# Patient Record
Sex: MALE | Race: WHITE | NOT HISPANIC OR LATINO | Employment: OTHER | URBAN - METROPOLITAN AREA
[De-identification: names, ages, dates, MRNs, and addresses within clinical notes are randomized per-mention and may not be internally consistent; named-entity substitution may affect disease eponyms.]

---

## 2022-12-09 ENCOUNTER — OFFICE VISIT (OUTPATIENT)
Dept: URGENT CARE | Facility: CLINIC | Age: 52
End: 2022-12-09

## 2022-12-09 VITALS
OXYGEN SATURATION: 97 % | SYSTOLIC BLOOD PRESSURE: 189 MMHG | HEART RATE: 85 BPM | DIASTOLIC BLOOD PRESSURE: 99 MMHG | TEMPERATURE: 98.4 F

## 2022-12-09 DIAGNOSIS — J02.8 ACUTE PHARYNGITIS DUE TO OTHER SPECIFIED ORGANISMS: ICD-10-CM

## 2022-12-09 DIAGNOSIS — Z09 FOLLOW-UP TREATMENT: ICD-10-CM

## 2022-12-09 DIAGNOSIS — R05.1 ACUTE COUGH: ICD-10-CM

## 2022-12-09 DIAGNOSIS — J06.9 VIRAL UPPER RESPIRATORY TRACT INFECTION WITH COUGH: Primary | ICD-10-CM

## 2022-12-09 DIAGNOSIS — Z20.828 RSV EXPOSURE: ICD-10-CM

## 2022-12-09 LAB — S PYO AG THROAT QL: NEGATIVE

## 2022-12-09 RX ORDER — DEXTROMETHORPHAN HYDROBROMIDE AND PROMETHAZINE HYDROCHLORIDE 15; 6.25 MG/5ML; MG/5ML
5 SOLUTION ORAL 4 TIMES DAILY PRN
Qty: 160 ML | Refills: 0 | Status: SHIPPED | OUTPATIENT
Start: 2022-12-09

## 2022-12-09 RX ORDER — METFORMIN HYDROCHLORIDE 500 MG/1
TABLET, EXTENDED RELEASE ORAL
COMMUNITY

## 2022-12-09 RX ORDER — LOSARTAN POTASSIUM 25 MG/1
25 TABLET ORAL DAILY
COMMUNITY

## 2022-12-09 RX ORDER — LISINOPRIL 20 MG/1
20 TABLET ORAL DAILY
COMMUNITY

## 2022-12-09 RX ORDER — ATORVASTATIN CALCIUM 20 MG/1
20 TABLET, FILM COATED ORAL DAILY
COMMUNITY

## 2022-12-09 RX ORDER — FENOFIBRATE 145 MG/1
145 TABLET, COATED ORAL DAILY
COMMUNITY

## 2022-12-09 RX ORDER — LEVOTHYROXINE SODIUM 0.03 MG/1
25 TABLET ORAL DAILY
COMMUNITY

## 2022-12-09 NOTE — PROGRESS NOTES
[facility]        NAME: Flower Caballero is a 47 y o  male  : 1970    MRN: [id number]  DATE: 2022  TIME: 10:35 AM    Assessment and Plan   Viral upper respiratory tract infection with cough [J06 9]  1  Viral upper respiratory tract infection with cough  Promethazine-DM (PHENERGAN-DM) 6 25-15 mg/5 mL oral syrup    Ambulatory Referral to [facility]      2  Acute pharyngitis due to other specified organisms  Promethazine-DM (PHENERGAN-DM) 6 25-15 mg/5 mL oral syrup    Ambulatory Referral to [facility]    POCT rapid strepA    Throat culture      3  Acute cough  Promethazine-DM (PHENERGAN-DM) 6 25-15 mg/5 mL oral syrup    Ambulatory Referral to [facility]    Cov/Flu-Collected at [facility]      4  RSV exposure  Promethazine-DM (PHENERGAN-DM) 6 25-15 mg/5 mL oral syrup    Ambulatory Referral to [facility]      5  Follow-up treatment  Ambulatory Referral to [facility]            Patient Instructions       Follow up with PCP in 3-5 days  Proceed to  ER if symptoms worsen  You have been prescribed promethazine DM for cough - you are not to drive or drink alcohol with this medication  Drink water  Your symptoms appear to be viral cold symptoms  Your strep A is negative  You have a throat culture pending  You are to download  mychart for the results in 3-4 days  You will be notified if the results are + and an antibiotic will be called in for you  You are to do warm salt water gargles 4 x daily  Drink warm tea with honey and lemon  Take tylenol or motrin as able for pain or fever  Chloraseptic throat spray, cough drops  Do not share utensils  Change your tooth brush in 3 days  You have flu like symptoms  You are to rest  Drink gatorade or pedialyte for rehydration  You are to eat a BRAT diet - bananas, rice, applesauce and toast   You may try imodium for diarrhea  Take tylenol or motrin for fever or pain     You are to download [facility] mychart for the results in 24-48 hours  You will be notified if the results are positive  You are to mask x 10 days and mask if still symptomatic after the 10 days  You appear to have covid/symptoms  You have a covid test pending  You are to download  mychart for the results in 24-48 hours  You will be notified if results are +  You are to take vitamin C, D, robitussin for cough  Do not take cough suppressants; you want to take an expectorant  Sleep on your stomach  You are to quarantine as required per CDC guidelines  Mask x 10 days if positive  Mask as long as you have symptoms  See your PCP for follow up in 2-3 days  Go to the ED if symptoms worsen or are severe  Chief Complaint     Chief Complaint   Patient presents with   • Cold Like Symptoms   • Cough   • Sore Throat   • Earache         History of Present Illness       This is a 47year old male who states both sons had RSV over Thanksgiving and he and his wife are now is as well  He states he has cough, congestion, diarrhea, occasional sorethroat, earache  He denies flu vaccine but has had covid vaccine  He states taking OTC w/o relief  He does not have a PCP as he just moved here from [place]   Review of Systems   Constitutional: Positive for fatigue  HENT: Positive for congestion and sore throat  Eyes: Negative  Respiratory: Positive for cough  Cardiovascular: Negative  Gastrointestinal: Positive for diarrhea  Endocrine: Negative  Genitourinary: Negative  Musculoskeletal: Negative  Skin: Negative  Allergic/Immunologic: Negative  Neurological: Negative  Hematological: Negative  Psychiatric/Behavioral: Negative            Current Medications       Current Outpatient Medications:   •  atorvastatin (LIPITOR) 20 mg tablet, Take 20 mg by mouth daily, Disp: , Rfl:   •  fenofibrate (TRICOR) 145 mg tablet, Take 145 mg by mouth daily, Disp: , Rfl:   •  levothyroxine 25 mcg tablet, Take 25 mcg by mouth daily, Disp: , Rfl:   •  losartan (COZAAR) 25 mg tablet, Take 25 mg by mouth daily, Disp: , Rfl:   •  metFORMIN (GLUCOPHAGE-XR) 500 mg 24 hr tablet, Take by mouth daily with dinner, Disp: , Rfl:   •  Promethazine-DM (PHENERGAN-DM) 6 25-15 mg/5 mL oral syrup, Take 5 mL by mouth 4 (four) times a day as needed for cough, Disp: 160 mL, Rfl: 0  •  lisinopril (ZESTRIL) 20 mg tablet, Take 20 mg by mouth daily (Patient not taking: Reported on 12/9/2022), Disp: , Rfl:     Current Allergies     Allergies as of 12/09/2022 - Reviewed 12/09/2022   Allergen Reaction Noted   • Oxycodone-acetaminophen Syncope 05/12/2015            The following portions of the patient's history were reviewed and updated as appropriate: allergies, current medications, past family history, past medical history, past social history, past surgical history and problem list      Past Medical History:   Diagnosis Date   • Hypertension        History reviewed  No pertinent surgical history  History reviewed  No pertinent family history  Medications have been verified  Objective   BP (!) 189/99   Pulse 85   Temp 98 4 °F (36 9 °C)   SpO2 97%   No LMP for male patient  Physical Exam     Physical Exam  Vitals and nursing note reviewed  Constitutional:       General: He is not in acute distress  Appearance: He is well-developed  He is obese  He is not ill-appearing, toxic-appearing or diaphoretic  HENT:      Head: Normocephalic and atraumatic  Right Ear: Tympanic membrane and ear canal normal       Left Ear: Tympanic membrane and ear canal normal       Nose: Congestion present  Mouth/Throat:      Mouth: No oral lesions  Pharynx: Uvula midline  No pharyngeal swelling, oropharyngeal exudate, posterior oropharyngeal erythema or uvula swelling  Tonsils: No tonsillar exudate or tonsillar abscesses        Comments: Injected   Cardiovascular:      Rate and Rhythm: Normal rate and regular rhythm  Heart sounds: Normal heart sounds  No murmur heard  Pulmonary:      Effort: Pulmonary effort is normal       Breath sounds: Normal breath sounds  Musculoskeletal:      Cervical back: Normal range of motion and neck supple  Lymphadenopathy:      Cervical: No cervical adenopathy  Skin:     General: Skin is warm and dry  Capillary Refill: Capillary refill takes less than 2 seconds  Neurological:      General: No focal deficit present  Mental Status: He is alert     Psychiatric:         Mood and Affect: Mood normal          Behavior: Behavior normal

## 2022-12-09 NOTE — PATIENT INSTRUCTIONS
You have been prescribed promethazine DM for cough - you are not to drive or drink alcohol with this medication  Drink water  Your symptoms appear to be viral cold symptoms  Your strep A is negative  You have a throat culture pending  You are to download [facility] mychart for the results in 3-4 days  You will be notified if the results are + and an antibiotic will be called in for you  You are to do warm salt water gargles 4 x daily  Drink warm tea with honey and lemon  Take tylenol or motrin as able for pain or fever  Chloraseptic throat spray, cough drops  Do not share utensils  Change your tooth brush in 3 days  You have flu like symptoms  You are to rest  Drink gatorade or pedialyte for rehydration  You are to eat a BRAT diet - bananas, rice, applesauce and toast   You may try imodium for diarrhea  Take tylenol or motrin for fever or pain  You are to download SL mychart for the results in 24-48 hours  You will be notified if the results are positive  You are to mask x 10 days and mask if still symptomatic after the 10 days  You appear to have covid/symptoms  You have a covid test pending  You are to download [facility] mychart for the results in 24-48 hours  You will be notified if results are +  You are to take vitamin C, D, robitussin for cough  Do not take cough suppressants; you want to take an expectorant  Sleep on your stomach  You are to quarantine as required per CDC guidelines  Mask x 10 days if positive  Mask as long as you have symptoms  See your PCP for follow up in 2-3 days  Go to the ED if symptoms worsen or are severe

## 2022-12-10 LAB
FLUAV RNA RESP QL NAA+PROBE: NEGATIVE
FLUBV RNA RESP QL NAA+PROBE: NEGATIVE
SARS-COV-2 RNA RESP QL NAA+PROBE: NEGATIVE

## 2022-12-11 LAB — BACTERIA THROAT CULT: NORMAL

## 2023-08-25 ENCOUNTER — AMB VIDEO VISIT (OUTPATIENT)
Dept: OTHER | Facility: HOSPITAL | Age: 53
End: 2023-08-25

## 2023-08-25 DIAGNOSIS — Z20.7 SCABIES EXPOSURE: Primary | ICD-10-CM

## 2023-08-25 PROBLEM — E11.9 DIABETES MELLITUS (HCC): Status: ACTIVE | Noted: 2023-08-25

## 2023-08-25 PROBLEM — G47.33 OBSTRUCTIVE SLEEP APNEA: Status: ACTIVE | Noted: 2018-02-12

## 2023-08-25 PROBLEM — K21.9 GASTROESOPHAGEAL REFLUX DISEASE: Status: ACTIVE | Noted: 2019-07-15

## 2023-08-25 PROBLEM — I36.1 NONRHEUMATIC TRICUSPID VALVE REGURGITATION: Status: ACTIVE | Noted: 2019-10-10

## 2023-08-25 PROBLEM — R01.1 CARDIAC MURMUR: Status: ACTIVE | Noted: 2019-10-10

## 2023-08-25 PROBLEM — E78.5 HYPERLIPEMIA: Status: ACTIVE | Noted: 2023-08-25

## 2023-08-25 PROBLEM — I34.0 NONRHEUMATIC MITRAL VALVE REGURGITATION: Status: ACTIVE | Noted: 2019-10-10

## 2023-08-25 PROCEDURE — ECARE PR SL URGENT CARE VIRTUAL VISIT: Performed by: PHYSICIAN ASSISTANT

## 2023-08-25 RX ORDER — PERMETHRIN 50 MG/G
CREAM TOPICAL ONCE
Qty: 60 G | Refills: 0 | Status: SHIPPED | OUTPATIENT
Start: 2023-08-25 | End: 2023-08-25

## 2023-08-25 NOTE — PATIENT INSTRUCTIONS
Scabies   WHAT YOU NEED TO KNOW:   Scabies is a skin condition that is caused by scabies mites. Scabies mites are tiny bugs that burrow, lay eggs, and live underneath the skin. Scabies is spread through close contact with a person who has scabies. This includes having sex, sleeping in the same bed, or sharing towels or clothing. Scabies can spread quickly and must be treated as soon as it is found. DISCHARGE INSTRUCTIONS:   Return to the emergency department if:   You develop a fever and red, swollen, painful areas on your skin. Call your doctor if:   The bites become crusty or filled with pus. You have worsening itching after scabies treatment. You have new bite or burrow marks after treatment. You have questions or concerns about your condition or care. Medicines: You may need the following:  Prescription creams  are used to treat scabies. Apply a thin layer of cream  onto your entire body from the neck down. Leave the cream on  for the amount of time that is required for the medicine you are using. This may be between 8 to 14 hours. Take a bath or shower  to wash all medicine from your skin after the scabies treatment is done. Put on clean clothes  after you have rinsed the medicine off. You may need another scabies treatment in 7 to 10 days if you continue to have symptoms. Take your medicine as directed. Contact your healthcare provider if you think your medicine is not helping or if you have side effects. Tell your provider if you are allergic to any medicine. Keep a list of the medicines, vitamins, and herbs you take. Include the amounts, and when and why you take them. Bring the list or the pill bottles to follow-up visits. Carry your medicine list with you in case of an emergency. Help relieve itching: Your skin may continue to itch for 2 or 3 weeks, even after the scabies mites are gone. Over-the-counter antihistamines or cortisone cream may help relieve itching. Trim your fingernails so you do not spread any mites that are still alive after treatment. Do not scratch your skin. Scratches may cause a skin infection. A cool bath may also help relieve the itching. Prevent the spread of scabies:   Have all family members use scabies medicine. Have all family members use scabies medicine. Tell all sex partners and anyone who has shared your clothing or bed for the past month about the scabies. Tell them to ask their healthcare provider for scabies medicine even if they have no itching, rash, or burrow marks. Wash all items that you have used  starting 3 days before you learned about your scabies. Use hot water to wash all clothing, bedding, and towels. Dry them for at least 20 minutes on the hot cycle of a dryer. Take items to be dry cleaned that cannot be washed in a washing machine. Place any clothing or bedding that cannot be washed or dry cleaned in a closed plastic bag for 1 week. Do not have close body contact with anyone  until the scabies mites are gone. Talk to your provider about how long you need to wait. Ask about public places you should avoid, such as the gym. Return to work or school  24 hours after using scabies medicine, or as directed. Follow up with your doctor as directed:  Write down your questions so you remember to ask them during your visits. © Copyright Janette Jewjose maria 2022 Information is for End User's use only and may not be sold, redistributed or otherwise used for commercial purposes. The above information is an  only. It is not intended as medical advice for individual conditions or treatments. Talk to your doctor, nurse or pharmacist before following any medical regimen to see if it is safe and effective for you.

## 2023-08-25 NOTE — CARE ANYWHERE EVISITS
Visit Summary for [facility] - Gender: Male - Date of Birth: [id number]  Date: [id number] - Duration: 10 minutes  Patient: [facility]  Provider: Kevon Trujillo PA-C    Patient Contact Information  Address  [address]  [place]; [place] [id number]  [phone]    Visit Topics    Triage Questions   What is your current physical address in the event of a medical emergency? Answer []  Are you allergic to any medications? Answer []  Are you now or could you be pregnant? Answer []  Do you have any immune system compromise or chronic lung   disease? Answer []  Do you have any vulnerable family members in the home (infant, pregnant, cancer, elderly)? Answer []     Conversation Transcripts  [0A][0A] [Notification] You are connected with Kevon Trujillo PA-C, Urgent Care Specialist.[0A][Notification] Madiha Parson is located in [place]. [0A][Notification] Madiha Parson has shared health history. Jose Raul Neg .[0A]    Diagnosis  Cntct w & expsr to pediculosis, acariasis & oth infestations    Procedures  Value: 81122 Code: CPT-4 UNLISTED E&M SERVICE    Medications Prescribed    No prescriptions ordered    Electronically signed by: Ethel De La Fuente(NPI [id number])

## 2023-08-25 NOTE — PROGRESS NOTES
Video Visit - Jacquie Garcia 47 y.o. male MRN: [id number]    REQUIRED DOCUMENTATION:         1. This service was provided via [employer]. 2. Provider located at [facility]  [address] [address] [phone]. 3. [facility] provider: Delisa Santacruz PA-C.  4. Identify all parties in room with patient during [facility] visit:  significant other-permission granted  5. After connecting through [employer], patient was identified by name and date of birth. Patient was then informed that this was a Telemedicine visit and that the exam was being conducted confidentially over secure lines. My office door was closed. No one else was in the room. Patient acknowledged consent and understanding of privacy and security of the Telemedicine visit. I informed the patient that I have reviewed their record in Epic and presented the opportunity for them to ask any questions regarding the visit today. The patient agreed to participate. HPI  Pt reports itching and his wife was dx with scabies. Denies rash or areas that are more intensely itchy than others, but wants prophylaxis for scabies. Hasn't tried anything for treatment yet. Physical Exam  Constitutional:       General: He is not in acute distress. Appearance: Normal appearance. He is not toxic-appearing. Comments: pleasant   HENT:      Head: Normocephalic and atraumatic. Nose: No rhinorrhea. Mouth/Throat:      Mouth: Mucous membranes are moist.   Eyes:      Conjunctiva/sclera: Conjunctivae normal.      Comments: glasses   Cardiovascular:      Rate and Rhythm: Normal rate. Pulmonary:      Effort: Pulmonary effort is normal. No respiratory distress. Breath sounds: No wheezing (no gross audible wheeze through computer). Musculoskeletal:      Cervical back: Normal range of motion. Skin:     Findings: No rash (on face or neck or hands). Neurological:      Mental Status: He is alert. Cranial Nerves:  No dysarthria or facial asymmetry. Psychiatric:         Mood and Affect: Mood normal.         Behavior: Behavior normal.         Diagnoses and all orders for this visit:    Scabies exposure  -     permethrin (ELIMITE) 5 % cream; Apply topically once for 1 dose      Patient Instructions   Scabies   WHAT YOU NEED TO KNOW:   Scabies is a skin condition that is caused by scabies mites. Scabies mites are tiny bugs that burrow, lay eggs, and live underneath the skin. Scabies is spread through close contact with a person who has scabies. This includes having sex, sleeping in the same bed, or sharing towels or clothing. Scabies can spread quickly and must be treated as soon as it is found. DISCHARGE INSTRUCTIONS:   Return to the emergency department if:   • You develop a fever and red, swollen, painful areas on your skin. Call your doctor if:   • The bites become crusty or filled with pus. • You have worsening itching after scabies treatment. • You have new bite or burrow marks after treatment. • You have questions or concerns about your condition or care. Medicines: You may need the following:  • Prescription creams  are used to treat scabies. ? Apply a thin layer of cream  onto your entire body from the neck down. ? Leave the cream on  for the amount of time that is required for the medicine you are using. This may be between 8 to 14 hours. ? Take a bath or shower  to wash all medicine from your skin after the scabies treatment is done. ? Put on clean clothes  after you have rinsed the medicine off. You may need another scabies treatment in 7 to 10 days if you continue to have symptoms. • Take your medicine as directed. Contact your healthcare provider if you think your medicine is not helping or if you have side effects. Tell your provider if you are allergic to any medicine. Keep a list of the medicines, vitamins, and herbs you take.  Include the amounts, and when and why you take them. Bring the list or the pill bottles to follow-up visits. Carry your medicine list with you in case of an emergency. Help relieve itching: Your skin may continue to itch for 2 or 3 weeks, even after the scabies mites are gone. Over-the-counter antihistamines or cortisone cream may help relieve itching. Trim your fingernails so you do not spread any mites that are still alive after treatment. Do not scratch your skin. Scratches may cause a skin infection. A cool bath may also help relieve the itching. Prevent the spread of scabies:   • Have all family members use scabies medicine. Have all family members use scabies medicine. Tell all sex partners and anyone who has shared your clothing or bed for the past month about the scabies. Tell them to ask their healthcare provider for scabies medicine even if they have no itching, rash, or burrow marks. • Wash all items that you have used  starting 3 days before you learned about your scabies. Use hot water to wash all clothing, bedding, and towels. Dry them for at least 20 minutes on the hot cycle of a dryer. Take items to be dry cleaned that cannot be washed in a washing machine. Place any clothing or bedding that cannot be washed or dry cleaned in a closed plastic bag for 1 week. • Do not have close body contact with anyone  until the scabies mites are gone. Talk to your provider about how long you need to wait. Ask about public places you should avoid, such as the gym. • Return to work or school  24 hours after using scabies medicine, or as directed. Follow up with your doctor as directed:  Write down your questions so you remember to ask them during your visits. © Copyright [facility] 2022 Information is for End User's use only and may not be sold, redistributed or otherwise used for commercial purposes. The above information is an  only. It is not intended as medical advice for individual conditions or treatments.  Talk to your doctor, nurse or pharmacist before following any medical regimen to see if it is safe and effective for you.

## 2023-08-26 ENCOUNTER — NURSE TRIAGE (OUTPATIENT)
Dept: OTHER | Facility: OTHER | Age: 53
End: 2023-08-26

## 2023-08-26 DIAGNOSIS — B86 SCABIES: Primary | ICD-10-CM

## 2023-08-26 RX ORDER — PERMETHRIN 50 MG/G
CREAM TOPICAL ONCE
Qty: 60 G | Refills: 0 | Status: SHIPPED | OUTPATIENT
Start: 2023-08-26 | End: 2023-08-26

## 2023-08-26 NOTE — TELEPHONE ENCOUNTER
Reason for Disposition  • [1] Prescription prescribed recently is not at pharmacy AND [2] triager has access to patient's EMR AND [3] prescription is recorded in the EMR    Answer Assessment - Initial Assessment Questions  1. NAME of MEDICATION: "What medicine are you calling about?"      Aguilar      2.  QUESTION: "What is your question?" (e.g., medication refill, side effect)        Could the medication be transferred to [facility] in  [place]    Protocols used: MEDICATION QUESTION CALL-ADULT-

## 2024-05-21 ENCOUNTER — TELEPHONE (OUTPATIENT)
Age: 54
End: 2024-05-21

## 2024-05-21 NOTE — TELEPHONE ENCOUNTER
(New) patient wants to know if he could have some routine blood work done before visit on 5/29 and needs orders put in . Please advise patient if that can be done [phone]   - - -

## 2024-05-29 ENCOUNTER — OFFICE VISIT (OUTPATIENT)
Dept: FAMILY MEDICINE CLINIC | Facility: CLINIC | Age: 54
End: 2024-05-29
Payer: COMMERCIAL

## 2024-05-29 ENCOUNTER — APPOINTMENT (OUTPATIENT)
Dept: RADIOLOGY | Facility: CLINIC | Age: 54
End: 2024-05-29
Payer: COMMERCIAL

## 2024-05-29 VITALS
WEIGHT: 272.4 LBS | HEIGHT: 67 IN | TEMPERATURE: 96.5 F | HEART RATE: 80 BPM | OXYGEN SATURATION: 98 % | BODY MASS INDEX: 42.75 KG/M2 | SYSTOLIC BLOOD PRESSURE: 148 MMHG | DIASTOLIC BLOOD PRESSURE: 90 MMHG

## 2024-05-29 DIAGNOSIS — M79.641 RIGHT HAND PAIN: ICD-10-CM

## 2024-05-29 DIAGNOSIS — R03.0 ELEVATED BP WITHOUT DIAGNOSIS OF HYPERTENSION: ICD-10-CM

## 2024-05-29 DIAGNOSIS — Z76.89 ESTABLISHING CARE WITH NEW DOCTOR, ENCOUNTER FOR: Primary | ICD-10-CM

## 2024-05-29 DIAGNOSIS — G47.33 OSA (OBSTRUCTIVE SLEEP APNEA): ICD-10-CM

## 2024-05-29 DIAGNOSIS — H90.6 MIXED HEARING LOSS, BILATERAL: ICD-10-CM

## 2024-05-29 DIAGNOSIS — Z12.11 SCREENING FOR COLON CANCER: ICD-10-CM

## 2024-05-29 DIAGNOSIS — Z11.4 SCREENING FOR HIV (HUMAN IMMUNODEFICIENCY VIRUS): ICD-10-CM

## 2024-05-29 DIAGNOSIS — Z11.59 NEED FOR HEPATITIS C SCREENING TEST: ICD-10-CM

## 2024-05-29 DIAGNOSIS — E11.69 TYPE 2 DIABETES MELLITUS WITH OTHER SPECIFIED COMPLICATION, UNSPECIFIED WHETHER LONG TERM INSULIN USE (HCC): ICD-10-CM

## 2024-05-29 PROBLEM — E11.9 DIABETES MELLITUS (HCC): Status: RESOLVED | Noted: 2023-08-25 | Resolved: 2024-05-29

## 2024-05-29 PROBLEM — I36.1 NONRHEUMATIC TRICUSPID VALVE REGURGITATION: Status: RESOLVED | Noted: 2019-10-10 | Resolved: 2024-05-29

## 2024-05-29 PROBLEM — Z23 ENCOUNTER FOR IMMUNIZATION: Status: ACTIVE | Noted: 2024-05-29

## 2024-05-29 PROBLEM — K21.9 GASTROESOPHAGEAL REFLUX DISEASE: Status: RESOLVED | Noted: 2019-07-15 | Resolved: 2024-05-29

## 2024-05-29 PROBLEM — I34.0 NONRHEUMATIC MITRAL VALVE REGURGITATION: Status: RESOLVED | Noted: 2019-10-10 | Resolved: 2024-05-29

## 2024-05-29 LAB
LEFT EYE DIABETIC RETINOPATHY: NORMAL
LEFT EYE IMAGE QUALITY: NORMAL
LEFT EYE MACULAR EDEMA: NORMAL
LEFT EYE OTHER RETINOPATHY: NORMAL
RIGHT EYE DIABETIC RETINOPATHY: NORMAL
RIGHT EYE IMAGE QUALITY: NORMAL
RIGHT EYE MACULAR EDEMA: NORMAL
RIGHT EYE OTHER RETINOPATHY: NORMAL
SEVERITY (EYE EXAM): NORMAL
SL AMB POCT HEMOGLOBIN AIC: 7.5 (ref ?–6.5)

## 2024-05-29 PROCEDURE — 92250 FUNDUS PHOTOGRAPHY W/I&R: CPT | Performed by: STUDENT IN AN ORGANIZED HEALTH CARE EDUCATION/TRAINING PROGRAM

## 2024-05-29 PROCEDURE — 83036 HEMOGLOBIN GLYCOSYLATED A1C: CPT | Performed by: STUDENT IN AN ORGANIZED HEALTH CARE EDUCATION/TRAINING PROGRAM

## 2024-05-29 PROCEDURE — 99205 OFFICE O/P NEW HI 60 MIN: CPT | Performed by: STUDENT IN AN ORGANIZED HEALTH CARE EDUCATION/TRAINING PROGRAM

## 2024-05-29 PROCEDURE — 73130 X-RAY EXAM OF HAND: CPT

## 2024-05-29 RX ORDER — METFORMIN HYDROCHLORIDE 750 MG/1
750 TABLET, EXTENDED RELEASE ORAL
Qty: 100 TABLET | Refills: 3 | Status: SHIPPED | OUTPATIENT
Start: 2024-05-29

## 2024-05-29 RX ORDER — LOSARTAN POTASSIUM 50 MG/1
50 TABLET ORAL DAILY
Qty: 60 TABLET | Refills: 0 | Status: SHIPPED | OUTPATIENT
Start: 2024-05-29

## 2024-05-29 RX ORDER — ATORVASTATIN CALCIUM 40 MG/1
40 TABLET, FILM COATED ORAL DAILY
Qty: 90 TABLET | Refills: 0 | Status: SHIPPED | OUTPATIENT
Start: 2024-05-29

## 2024-05-29 NOTE — ASSESSMENT & PLAN NOTE
Uncontrolled A1c today 7.6  Foot exam eye exam done today  Will start metformin, Lipitor, losartan  Will likely need dual therapy will discuss in future

## 2024-05-29 NOTE — ASSESSMENT & PLAN NOTE
S/p boxers fracture 10 years  Worsening swelling deformity   Repeat xray and likely ortho referral
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
## 2024-06-24 ENCOUNTER — RA CDI HCC (OUTPATIENT)
Dept: OTHER | Facility: HOSPITAL | Age: 54
End: 2024-06-24

## 2024-06-24 DIAGNOSIS — E66.01 MORBID OBESITY (HCC): Primary | ICD-10-CM

## 2024-06-24 NOTE — PROGRESS NOTES
HCC coding opportunities          Chart Reviewed number of suggestions sent to Provider: 2     Patients Insurance        Commercial Insurance: [employer]     E11.65  E66.01

## 2024-06-28 ENCOUNTER — APPOINTMENT (OUTPATIENT)
Dept: LAB | Facility: CLINIC | Age: 54
End: 2024-06-28
Payer: COMMERCIAL

## 2024-06-28 DIAGNOSIS — R03.0 ELEVATED BP WITHOUT DIAGNOSIS OF HYPERTENSION: ICD-10-CM

## 2024-06-28 LAB
ALBUMIN SERPL BCG-MCNC: 4.3 G/DL (ref 3.5–5)
ALP SERPL-CCNC: 97 U/L (ref 34–104)
ALT SERPL W P-5'-P-CCNC: 33 U/L (ref 7–52)
ANION GAP SERPL CALCULATED.3IONS-SCNC: 8 MMOL/L (ref 4–13)
AST SERPL W P-5'-P-CCNC: 17 U/L (ref 13–39)
BASOPHILS # BLD AUTO: 0.08 THOUSANDS/ÂΜL (ref 0–0.1)
BASOPHILS NFR BLD AUTO: 1 % (ref 0–1)
BILIRUB SERPL-MCNC: 0.51 MG/DL (ref 0.2–1)
BUN SERPL-MCNC: 17 MG/DL (ref 5–25)
CALCIUM SERPL-MCNC: 9.3 MG/DL (ref 8.4–10.2)
CHLORIDE SERPL-SCNC: 104 MMOL/L (ref 96–108)
CHOLEST SERPL-MCNC: 144 MG/DL
CO2 SERPL-SCNC: 27 MMOL/L (ref 21–32)
CREAT SERPL-MCNC: 1.06 MG/DL (ref 0.6–1.3)
CREAT UR-MCNC: 191.3 MG/DL
EOSINOPHIL # BLD AUTO: 0.11 THOUSAND/ÂΜL (ref 0–0.61)
EOSINOPHIL NFR BLD AUTO: 1 % (ref 0–6)
ERYTHROCYTE [DISTWIDTH] IN BLOOD BY AUTOMATED COUNT: 13.6 % (ref 11.6–15.1)
GFR SERPL CREATININE-BSD FRML MDRD: 79 ML/MIN/1.73SQ M
GLUCOSE P FAST SERPL-MCNC: 186 MG/DL (ref 65–99)
HCT VFR BLD AUTO: 49.2 % (ref 36.5–49.3)
HDLC SERPL-MCNC: 56 MG/DL
HGB BLD-MCNC: 16 G/DL (ref 12–17)
IMM GRANULOCYTES # BLD AUTO: 0.05 THOUSAND/UL (ref 0–0.2)
IMM GRANULOCYTES NFR BLD AUTO: 1 % (ref 0–2)
LDLC SERPL CALC-MCNC: 49 MG/DL (ref 0–100)
LYMPHOCYTES # BLD AUTO: 1.73 THOUSANDS/ÂΜL (ref 0.6–4.47)
LYMPHOCYTES NFR BLD AUTO: 21 % (ref 14–44)
MCH RBC QN AUTO: 28.7 PG (ref 26.8–34.3)
MCHC RBC AUTO-ENTMCNC: 32.5 G/DL (ref 31.4–37.4)
MCV RBC AUTO: 88 FL (ref 82–98)
MICROALBUMIN UR-MCNC: 10.5 MG/L
MICROALBUMIN/CREAT 24H UR: 5 MG/G CREATININE (ref 0–30)
MONOCYTES # BLD AUTO: 0.56 THOUSAND/ÂΜL (ref 0.17–1.22)
MONOCYTES NFR BLD AUTO: 7 % (ref 4–12)
NEUTROPHILS # BLD AUTO: 5.64 THOUSANDS/ÂΜL (ref 1.85–7.62)
NEUTS SEG NFR BLD AUTO: 69 % (ref 43–75)
NRBC BLD AUTO-RTO: 0 /100 WBCS
PLATELET # BLD AUTO: 260 THOUSANDS/UL (ref 149–390)
PMV BLD AUTO: 10 FL (ref 8.9–12.7)
POTASSIUM SERPL-SCNC: 4.6 MMOL/L (ref 3.5–5.3)
PROT SERPL-MCNC: 7.1 G/DL (ref 6.4–8.4)
RBC # BLD AUTO: 5.57 MILLION/UL (ref 3.88–5.62)
SODIUM SERPL-SCNC: 139 MMOL/L (ref 135–147)
TRIGL SERPL-MCNC: 194 MG/DL
TSH SERPL DL<=0.05 MIU/L-ACNC: 1.89 UIU/ML (ref 0.45–4.5)
WBC # BLD AUTO: 8.17 THOUSAND/UL (ref 4.31–10.16)

## 2024-06-28 PROCEDURE — 86376 MICROSOMAL ANTIBODY EACH: CPT

## 2024-06-28 PROCEDURE — 80053 COMPREHEN METABOLIC PANEL: CPT

## 2024-06-28 PROCEDURE — 84443 ASSAY THYROID STIM HORMONE: CPT

## 2024-06-28 PROCEDURE — 82570 ASSAY OF URINE CREATININE: CPT

## 2024-06-28 PROCEDURE — 80061 LIPID PANEL: CPT

## 2024-06-28 PROCEDURE — 82043 UR ALBUMIN QUANTITATIVE: CPT

## 2024-06-28 PROCEDURE — 85025 COMPLETE CBC W/AUTO DIFF WBC: CPT

## 2024-06-28 PROCEDURE — 36415 COLL VENOUS BLD VENIPUNCTURE: CPT

## 2024-06-28 PROCEDURE — 84445 ASSAY OF TSI GLOBULIN: CPT

## 2024-06-29 LAB
THYROPEROXIDASE AB SERPL-ACNC: 12 IU/ML (ref 0–34)
TSI SER-ACNC: <0.1 IU/L (ref 0–0.55)

## 2024-07-01 ENCOUNTER — OFFICE VISIT (OUTPATIENT)
Dept: FAMILY MEDICINE CLINIC | Facility: CLINIC | Age: 54
End: 2024-07-01
Payer: COMMERCIAL

## 2024-07-01 VITALS
DIASTOLIC BLOOD PRESSURE: 86 MMHG | TEMPERATURE: 96 F | BODY MASS INDEX: 43.13 KG/M2 | HEIGHT: 67 IN | SYSTOLIC BLOOD PRESSURE: 128 MMHG | WEIGHT: 274.8 LBS | HEART RATE: 81 BPM | OXYGEN SATURATION: 97 %

## 2024-07-01 DIAGNOSIS — Z12.11 SCREENING FOR COLON CANCER: ICD-10-CM

## 2024-07-01 DIAGNOSIS — M25.441 SWELLING OF JOINT OF RIGHT HAND: ICD-10-CM

## 2024-07-01 DIAGNOSIS — E11.69 TYPE 2 DIABETES MELLITUS WITH OTHER SPECIFIED COMPLICATION, UNSPECIFIED WHETHER LONG TERM INSULIN USE (HCC): ICD-10-CM

## 2024-07-01 DIAGNOSIS — Z11.4 SCREENING FOR HIV (HUMAN IMMUNODEFICIENCY VIRUS): ICD-10-CM

## 2024-07-01 DIAGNOSIS — Z11.59 NEED FOR HEPATITIS C SCREENING TEST: Primary | ICD-10-CM

## 2024-07-01 PROCEDURE — 99396 PREV VISIT EST AGE 40-64: CPT | Performed by: STUDENT IN AN ORGANIZED HEALTH CARE EDUCATION/TRAINING PROGRAM

## 2024-07-01 NOTE — PROGRESS NOTES
Adult Annual Physical  Name: Vu Mcdaniels      : 1970      MRN: [id number]  Encounter Provider: Markos Yousif MD  Encounter Date: 2024   Encounter department: [facility] PRIMARY CARE    Assessment & Plan   1. Need for hepatitis C screening test  2. Screening for HIV (human immunodeficiency virus)  3. Type 2 diabetes mellitus with other specified complication, unspecified whether long term insulin use (HCC)  4. Screening for colon cancer  -     Cologuard  5. Swelling of joint of right hand  -     US MSK limited; Future; Expected date: 2024    Immunizations and preventive care screenings were discussed with patient today. Appropriate education was printed on patient's after visit summary.        Counseling:  Alcohol/drug use: discussed moderation in alcohol intake, the recommendations for healthy alcohol use, and avoidance of illicit drug use.  Dental Health: discussed importance of regular tooth brushing, flossing, and dental visits.  Injury prevention: discussed safety/seat belts, safety helmets, smoke detectors, carbon dioxide detectors, and smoking near bedding or upholstery.  Sexual health: discussed sexually transmitted diseases, partner selection, use of condoms, avoidance of unintended pregnancy, and contraceptive alternatives.         History of Present Illness     Adult Annual Physical:  Patient presents for annual physical.     Diet and Physical Activity:  - Diet/Nutrition: well balanced diet.  - Exercise: no formal exercise.    General Health:  - Sleep: sleeps well.  - Hearing: normal hearing right ear.  - Vision: no vision problems.  - Dental: regular dental visits.     Health:  - History of STDs: no.   - Urinary symptoms: none.     Advanced Care Planning:  - Has an advanced directive?: no    - Has a durable medical POA?: no    - ACP document given to patient?: no      Review of Systems   Constitutional:  Negative for activity change, appetite change, chills, fatigue  "and fever.   HENT:  Negative for congestion, dental problem, drooling, ear discharge, ear pain, facial swelling, postnasal drip, rhinorrhea and sinus pain.    Eyes:  Negative for photophobia, pain, discharge and itching.   Respiratory:  Negative for apnea, cough, chest tightness and shortness of breath.    Cardiovascular:  Negative for chest pain and leg swelling.   Gastrointestinal:  Negative for abdominal distention, abdominal pain, anal bleeding, constipation, diarrhea and nausea.   Endocrine: Negative for cold intolerance, heat intolerance and polydipsia.   Genitourinary:  Negative for difficulty urinating.   Musculoskeletal:  Negative for arthralgias, gait problem, joint swelling and myalgias.   Skin:  Negative for color change and pallor.   Allergic/Immunologic: Negative for immunocompromised state.   Neurological:  Negative for dizziness, seizures, facial asymmetry, weakness, light-headedness, numbness and headaches.   Psychiatric/Behavioral:  Negative for agitation, behavioral problems, confusion, decreased concentration and dysphoric mood.    All other systems reviewed and are negative.    Pertinent Medical History           Objective     /86 (BP Location: Left arm, Patient Position: Sitting, Cuff Size: Large)   Pulse 81   Temp (!) 96 °F (35.6 °C) (Tympanic)   Ht 5' 7\" (1.702 m)   Wt 125 kg (274 lb 12.8 oz)   SpO2 97%   BMI 43.04 kg/m²     Physical Exam  Constitutional:       Appearance: He is well-developed. He is obese. He is not ill-appearing or diaphoretic.   HENT:      Head: Normocephalic.      Right Ear: Tympanic membrane normal.      Left Ear: Tympanic membrane normal.   Eyes:      Pupils: Pupils are equal, round, and reactive to light.   Cardiovascular:      Rate and Rhythm: Normal rate and regular rhythm.   Pulmonary:      Effort: Pulmonary effort is normal.      Breath sounds: Normal breath sounds.   Abdominal:      General: Bowel sounds are normal.      Palpations: Abdomen is soft. "   Musculoskeletal:         General: Normal range of motion.      Cervical back: Normal range of motion and neck supple.   Skin:     General: Skin is warm.   Neurological:      Mental Status: He is alert.       Administrative Statements

## 2024-08-01 DIAGNOSIS — R03.0 ELEVATED BP WITHOUT DIAGNOSIS OF HYPERTENSION: ICD-10-CM

## 2024-08-01 DIAGNOSIS — E11.69 TYPE 2 DIABETES MELLITUS WITH OTHER SPECIFIED COMPLICATION, UNSPECIFIED WHETHER LONG TERM INSULIN USE (HCC): ICD-10-CM

## 2024-08-02 RX ORDER — LOSARTAN POTASSIUM 50 MG/1
50 TABLET ORAL DAILY
Qty: 100 TABLET | Refills: 1 | Status: SHIPPED | OUTPATIENT
Start: 2024-08-02

## 2024-08-02 RX ORDER — ATORVASTATIN CALCIUM 40 MG/1
40 TABLET, FILM COATED ORAL DAILY
Qty: 100 TABLET | Refills: 1 | Status: SHIPPED | OUTPATIENT
Start: 2024-08-02

## 2024-08-07 ENCOUNTER — TELEPHONE (OUTPATIENT)
Dept: FAMILY MEDICINE CLINIC | Facility: CLINIC | Age: 54
End: 2024-08-07

## 2024-08-07 DIAGNOSIS — Z12.11 SCREENING FOR COLON CANCER: Primary | ICD-10-CM

## 2024-08-07 LAB — COLOGUARD RESULT REPORTABLE: POSITIVE

## 2024-08-07 NOTE — TELEPHONE ENCOUNTER
----- Message from Markos Yousif MD sent at 8/7/2024  7:01 AM EDT -----  Positive Cologuard, patient will need GI follow-up

## 2024-08-23 ENCOUNTER — TELEPHONE (OUTPATIENT)
Age: 54
End: 2024-08-23

## 2024-08-23 NOTE — TELEPHONE ENCOUNTER
[facility] COLON     Screened by: Gege Negron MA    Referring Provider pcp    Pre- Screening:     There is no height or weight on file to calculate BMI.  Has patient been referred for a routine screening Colonoscopy? yes  Is the patient between 45-75 years old? yes      Previous Colonoscopy yes   If yes:    Date:10 + years     Facility:     Reason:       Does the patient want to see a Gastroenterologist prior to their procedure OR are they having any GI symptoms? no    Has the patient been hospitalized or had abdominal surgery in the past 6 months? no    Does the patient use supplemental oxygen? no    Does the patient take Coumadin, Lovenox, Plavix, Elliquis, Xarelto, or other blood thinning medication? no    Has the patient had a stroke, cardiac event, or stent placed in the past year? no    Colon consult scheudled ... Colonoscopy scheduled    If patient is between 45yrs - 49yrs, please advise patient that we will have to confirm benefits & coverage with their insurance company for a routine screening colonoscopy.      Scheduled date of colonoscopy (as of today):10/15/2024  Physician performing colonoscopy:Delmer  Location of colonoscopy:[facility]  Bowel prep reviewed with patient:ANDREW/DUL  Instructions reviewed with patient by:gege blancas  Clearances: randell   Opzelura Counseling:  I discussed with the patient the risks of Opzelura including but not limited to nasopharngitis, bronchitis, ear infection, eosinophila, hives, diarrhea, folliculitis, tonsillitis, and rhinorrhea.  Taken orally, this medication has been linked to serious infections; higher rate of mortality; malignancy and lymphoproliferative disorders; major adverse cardiovascular events; thrombosis; thrombocytopenia, anemia, and neutropenia; and lipid elevations.

## 2024-08-27 ENCOUNTER — APPOINTMENT (OUTPATIENT)
Dept: LAB | Facility: CLINIC | Age: 54
End: 2024-08-27
Payer: COMMERCIAL

## 2024-08-27 DIAGNOSIS — R03.0 ELEVATED BP WITHOUT DIAGNOSIS OF HYPERTENSION: ICD-10-CM

## 2024-08-27 LAB
EST. AVERAGE GLUCOSE BLD GHB EST-MCNC: 217 MG/DL
HBA1C MFR BLD: 9.2 %

## 2024-08-27 PROCEDURE — 83036 HEMOGLOBIN GLYCOSYLATED A1C: CPT

## 2024-08-27 PROCEDURE — 36415 COLL VENOUS BLD VENIPUNCTURE: CPT

## 2024-08-28 ENCOUNTER — TELEPHONE (OUTPATIENT)
Dept: FAMILY MEDICINE CLINIC | Facility: CLINIC | Age: 54
End: 2024-08-28

## 2024-08-28 NOTE — TELEPHONE ENCOUNTER
----- Message from Markos Yousif MD sent at 8/27/2024  7:49 PM EDT -----  Significant increase in A1c is patient taking his medications?  Will discuss with him at his appointment next week.  ----- Message -----  From: Lab, Background User  Sent: 8/27/2024   7:44 PM EDT  To: Markos Yousif MD

## 2024-09-05 ENCOUNTER — OFFICE VISIT (OUTPATIENT)
Dept: FAMILY MEDICINE CLINIC | Facility: CLINIC | Age: 54
End: 2024-09-05
Payer: COMMERCIAL

## 2024-09-05 VITALS
HEIGHT: 67 IN | HEART RATE: 88 BPM | OXYGEN SATURATION: 98 % | WEIGHT: 269.8 LBS | DIASTOLIC BLOOD PRESSURE: 80 MMHG | TEMPERATURE: 96.7 F | SYSTOLIC BLOOD PRESSURE: 124 MMHG | BODY MASS INDEX: 42.35 KG/M2

## 2024-09-05 DIAGNOSIS — E11.69 TYPE 2 DIABETES MELLITUS WITH OTHER SPECIFIED COMPLICATION, WITHOUT LONG-TERM CURRENT USE OF INSULIN (HCC): Primary | ICD-10-CM

## 2024-09-05 DIAGNOSIS — E11.69 TYPE 2 DIABETES MELLITUS WITH OTHER SPECIFIED COMPLICATION, UNSPECIFIED WHETHER LONG TERM INSULIN USE (HCC): ICD-10-CM

## 2024-09-05 DIAGNOSIS — G47.33 OSA (OBSTRUCTIVE SLEEP APNEA): ICD-10-CM

## 2024-09-05 PROCEDURE — 3079F DIAST BP 80-89 MM HG: CPT | Performed by: STUDENT IN AN ORGANIZED HEALTH CARE EDUCATION/TRAINING PROGRAM

## 2024-09-05 PROCEDURE — 3074F SYST BP LT 130 MM HG: CPT | Performed by: STUDENT IN AN ORGANIZED HEALTH CARE EDUCATION/TRAINING PROGRAM

## 2024-09-05 PROCEDURE — 99214 OFFICE O/P EST MOD 30 MIN: CPT | Performed by: STUDENT IN AN ORGANIZED HEALTH CARE EDUCATION/TRAINING PROGRAM

## 2024-09-05 RX ORDER — METFORMIN HCL 500 MG
1000 TABLET, EXTENDED RELEASE 24 HR ORAL
Qty: 60 TABLET | Refills: 2 | Status: SHIPPED | OUTPATIENT
Start: 2024-09-05 | End: 2024-12-04

## 2024-09-05 RX ORDER — DULAGLUTIDE 0.75 MG/.5ML
0.75 INJECTION, SOLUTION SUBCUTANEOUS WEEKLY
Qty: 6 ML | Refills: 1 | Status: SHIPPED | OUTPATIENT
Start: 2024-09-05

## 2024-09-05 NOTE — ASSESSMENT & PLAN NOTE
Lab Results   Component Value Date    HGBA1C 9.2 (H) 08/27/2024     Significant increase in A1c since last appointment.  Was 7.5   3 months ago  Currently on metformin 750 mg daily, no other agents  He is on high-dose statin as well as losartan.    Continue to stress importance of lifestyle modifications.  With the significant increase in A1c need to further uptitrate therapy may even consider triple therapy    Will increase metformin to 1000mg  Will start trulicity

## 2024-09-05 NOTE — PROGRESS NOTES
Ambulatory Visit  Name: Vu Mcdaniels      : 1970      MRN: [id number]  Encounter Provider: Markos Yousif MD  Encounter Date: 2024   Encounter department: [facility] PRIMARY CARE    Assessment & Plan   1. Type 2 diabetes mellitus with other specified complication, without long-term current use of insulin (HCC)  Assessment & Plan:    Lab Results   Component Value Date    HGBA1C 9.2 (H) 2024     Significant increase in A1c since last appointment.  Was 7.5   3 months ago  Currently on metformin 750 mg daily, no other agents  He is on high-dose statin as well as losartan.    Continue to stress importance of lifestyle modifications.  With the significant increase in A1c need to further uptitrate therapy may even consider triple therapy    Will increase metformin to 1000mg  Will start trulicity      Orders:  -     dulaglutide (Trulicity) 0.75 MG/0.5ML injection; Inject 0.5 mL (0.75 mg total) under the skin once a week  2. ESTEPHANIA (obstructive sleep apnea)  Assessment & Plan:  Utilizes CPAP  Stable for 7-8 years  3. Type 2 diabetes mellitus with other specified complication, unspecified whether long term insulin use (HCC)  Assessment & Plan:    Lab Results   Component Value Date    HGBA1C 9.2 (H) 2024     Significant increase in A1c since last appointment.  Was 7.5   3 months ago  Currently on metformin 750 mg daily, no other agents  He is on high-dose statin as well as losartan.    Continue to stress importance of lifestyle modifications.  With the significant increase in A1c need to further uptitrate therapy may even consider triple therapy    Will increase metformin to 1000mg  Will start trulicity      Orders:  -     metFORMIN (GLUCOPHAGE-XR) 500 mg 24 hr tablet; Take 2 tablets (1,000 mg total) by mouth daily with dinner       History of Present Illness         This is a 53 y.o. male who presents to the office for routine follow-up of chronic medical conditions.  Overall they report  "feeling well they deny any significant interval history since her last appointment.  They deny any new issues and they are taking her prescribed medications as instructed without any side effects or concerns.          Review of Systems   Constitutional:  Negative for activity change, appetite change, chills, fatigue and fever.   HENT:  Negative for congestion, dental problem, drooling, ear discharge, ear pain, facial swelling, postnasal drip, rhinorrhea and sinus pain.    Eyes:  Negative for photophobia, pain, discharge and itching.   Respiratory:  Negative for apnea, cough, chest tightness and shortness of breath.    Cardiovascular:  Negative for chest pain and leg swelling.   Gastrointestinal:  Negative for abdominal distention, abdominal pain, anal bleeding, constipation, diarrhea and nausea.   Endocrine: Negative for cold intolerance, heat intolerance and polydipsia.   Genitourinary:  Negative for difficulty urinating.   Musculoskeletal:  Negative for arthralgias, gait problem, joint swelling and myalgias.   Skin:  Negative for color change and pallor.   Allergic/Immunologic: Negative for immunocompromised state.   Neurological:  Negative for dizziness, seizures, facial asymmetry, weakness, light-headedness, numbness and headaches.   Psychiatric/Behavioral:  Negative for agitation, behavioral problems, confusion, decreased concentration and dysphoric mood.    All other systems reviewed and are negative.      Objective     /80 (BP Location: Left arm, Patient Position: Sitting, Cuff Size: Large)   Pulse 88   Temp (!) 96.7 °F (35.9 °C) (Tympanic)   Ht 5' 7\" (1.702 m)   Wt 122 kg (269 lb 12.8 oz)   SpO2 98%   BMI 42.26 kg/m²     Physical Exam  Constitutional:       Appearance: He is well-developed.   HENT:      Head: Normocephalic.   Eyes:      Pupils: Pupils are equal, round, and reactive to light.   Cardiovascular:      Rate and Rhythm: Normal rate and regular rhythm.   Pulmonary:      Effort: " Pulmonary effort is normal.      Breath sounds: Normal breath sounds.   Abdominal:      General: Bowel sounds are normal.      Palpations: Abdomen is soft.   Musculoskeletal:         General: Normal range of motion.      Cervical back: Normal range of motion and neck supple.   Skin:     General: Skin is warm.       Administrative Statements

## 2024-09-12 ENCOUNTER — TELEPHONE (OUTPATIENT)
Age: 54
End: 2024-09-12

## 2024-09-12 NOTE — TELEPHONE ENCOUNTER
PA for dulaglutide (Trulicity) 0.75 MG/0.5ML injection SUBMITTED     via    []CMM-KEY:   [x]Surescripts-Case ID # [id number]   []Faxed to plan   []Other website   []Phone call Case ID #     Office notes sent, clinical questions answered. Awaiting determination    Turnaround time for your insurance to make a decision on your Prior Authorization can take 7-21 business days.

## 2024-09-13 NOTE — TELEPHONE ENCOUNTER
PA for Trulicity 0.75 mg  APPROVED     Date(s) approved September 12, 2024 to September 12, 2025     Case #[id number]     Patient advised by          [][employer] Message  []Phone call   []LMOM  []L/M to call office as no active Communication consent on file  [x]Unable to leave detailed message as VM not approved on Communication consent       Pharmacy advised by    [x]Fax  []Phone call    Approval letter scanned into Media Yes

## 2024-10-03 ENCOUNTER — TELEPHONE (OUTPATIENT)
Dept: FAMILY MEDICINE CLINIC | Facility: CLINIC | Age: 54
End: 2024-10-03

## 2024-10-03 NOTE — TELEPHONE ENCOUNTER
Patient was post to call with his blood sugar readings as he is uncontrolled and recently changed his medication.  I have not heard from the patient who please reach out to him and ask him how his blood sugars have been

## 2024-10-04 DIAGNOSIS — E11.69 TYPE 2 DIABETES MELLITUS WITH OTHER SPECIFIED COMPLICATION, WITHOUT LONG-TERM CURRENT USE OF INSULIN (HCC): Primary | ICD-10-CM

## 2024-10-04 RX ORDER — BLOOD SUGAR DIAGNOSTIC
STRIP MISCELLANEOUS
Qty: 200 EACH | Refills: 3 | Status: SHIPPED | OUTPATIENT
Start: 2024-10-04

## 2024-10-04 RX ORDER — BLOOD-GLUCOSE METER
KIT MISCELLANEOUS
Qty: 1 KIT | Refills: 0 | Status: SHIPPED | OUTPATIENT
Start: 2024-10-04

## 2024-10-04 RX ORDER — LANCETS 33 GAUGE
EACH MISCELLANEOUS
Qty: 200 EACH | Refills: 3 | Status: SHIPPED | OUTPATIENT
Start: 2024-10-04

## 2024-10-04 NOTE — TELEPHONE ENCOUNTER
Spoke to pt, he said thank you, also he will try to be better at meds. Can you please send in a rx for  a glucose monitor,lancets and strips. To [facility]
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
## 2024-10-04 NOTE — TELEPHONE ENCOUNTER
Okay thank you for let me know please send our condolences to his family.  Unfortunately please try to encourage him to take his medications he was fairly not consistent with taking his medications at our last appointment and his A1c jumped up significantly to 9.2.  If we do not get this under control he will be started on insulin at the next appointment.

## 2024-10-16 DIAGNOSIS — E11.69 TYPE 2 DIABETES MELLITUS WITH OTHER SPECIFIED COMPLICATION, UNSPECIFIED WHETHER LONG TERM INSULIN USE (HCC): ICD-10-CM

## 2024-10-16 RX ORDER — METFORMIN HYDROCHLORIDE 500 MG/1
1000 TABLET, EXTENDED RELEASE ORAL
Qty: 60 TABLET | Refills: 2 | Status: SHIPPED | OUTPATIENT
Start: 2024-10-16 | End: 2025-01-14

## 2024-11-07 DIAGNOSIS — E11.69 TYPE 2 DIABETES MELLITUS WITH OTHER SPECIFIED COMPLICATION, UNSPECIFIED WHETHER LONG TERM INSULIN USE (HCC): ICD-10-CM

## 2024-11-07 RX ORDER — METFORMIN HYDROCHLORIDE 500 MG/1
1000 TABLET, EXTENDED RELEASE ORAL
Qty: 60 TABLET | Refills: 2 | Status: SHIPPED | OUTPATIENT
Start: 2024-11-07 | End: 2025-02-05

## 2024-11-11 DIAGNOSIS — E11.69 TYPE 2 DIABETES MELLITUS WITH OTHER SPECIFIED COMPLICATION, UNSPECIFIED WHETHER LONG TERM INSULIN USE (HCC): Primary | ICD-10-CM

## 2024-11-30 ENCOUNTER — OFFICE VISIT (OUTPATIENT)
Dept: URGENT CARE | Facility: CLINIC | Age: 54
End: 2024-11-30
Payer: COMMERCIAL

## 2024-11-30 ENCOUNTER — HOSPITAL ENCOUNTER (EMERGENCY)
Facility: HOSPITAL | Age: 54
Discharge: HOME | End: 2024-11-30
Attending: INTERNAL MEDICINE
Payer: COMMERCIAL

## 2024-11-30 VITALS
TEMPERATURE: 98.7 F | RESPIRATION RATE: 20 BRPM | DIASTOLIC BLOOD PRESSURE: 108 MMHG | HEART RATE: 82 BPM | OXYGEN SATURATION: 97 % | WEIGHT: 279 LBS | BODY MASS INDEX: 43.79 KG/M2 | HEIGHT: 67 IN | SYSTOLIC BLOOD PRESSURE: 168 MMHG

## 2024-11-30 VITALS
RESPIRATION RATE: 18 BRPM | HEART RATE: 86 BPM | TEMPERATURE: 98.3 F | SYSTOLIC BLOOD PRESSURE: 154 MMHG | DIASTOLIC BLOOD PRESSURE: 91 MMHG | OXYGEN SATURATION: 96 %

## 2024-11-30 DIAGNOSIS — Z22.322 MRSA (METHICILLIN RESISTANT STAPH AUREUS) CULTURE POSITIVE: ICD-10-CM

## 2024-11-30 DIAGNOSIS — I10 PRIMARY HYPERTENSION: ICD-10-CM

## 2024-11-30 DIAGNOSIS — L03.211 FACIAL CELLULITIS: ICD-10-CM

## 2024-11-30 DIAGNOSIS — B02.9 HERPES ZOSTER WITHOUT COMPLICATION: ICD-10-CM

## 2024-11-30 DIAGNOSIS — R07.89 CHEST TIGHTNESS: ICD-10-CM

## 2024-11-30 DIAGNOSIS — B02.9 SHINGLES: Primary | ICD-10-CM

## 2024-11-30 DIAGNOSIS — R03.0 ELEVATED BLOOD PRESSURE READING: Primary | ICD-10-CM

## 2024-11-30 PROCEDURE — 93005 ELECTROCARDIOGRAM TRACING: CPT | Performed by: PHYSICIAN ASSISTANT

## 2024-11-30 PROCEDURE — S9083 URGENT CARE CENTER GLOBAL: HCPCS | Performed by: PHYSICIAN ASSISTANT

## 2024-11-30 PROCEDURE — 99284 EMERGENCY DEPT VISIT MOD MDM: CPT | Performed by: INTERNAL MEDICINE

## 2024-11-30 PROCEDURE — G0382 LEV 3 HOSP TYPE B ED VISIT: HCPCS | Performed by: PHYSICIAN ASSISTANT

## 2024-11-30 PROCEDURE — 99283 EMERGENCY DEPT VISIT LOW MDM: CPT

## 2024-11-30 RX ORDER — DULAGLUTIDE 0.75 MG/.5ML
INJECTION, SOLUTION SUBCUTANEOUS
COMMUNITY
Start: 2024-11-20

## 2024-11-30 RX ORDER — SULFAMETHOXAZOLE AND TRIMETHOPRIM 800; 160 MG/1; MG/1
1 TABLET ORAL 2 TIMES DAILY
Qty: 14 TABLET | Refills: 0 | Status: SHIPPED | OUTPATIENT
Start: 2024-11-30 | End: 2024-12-07

## 2024-11-30 RX ORDER — VALACYCLOVIR HYDROCHLORIDE 500 MG/1
1000 TABLET, FILM COATED ORAL ONCE
Status: COMPLETED | OUTPATIENT
Start: 2024-11-30 | End: 2024-11-30

## 2024-11-30 RX ORDER — LOSARTAN POTASSIUM 100 MG/1
100 TABLET ORAL DAILY
Qty: 15 TABLET | Refills: 0 | Status: SHIPPED | OUTPATIENT
Start: 2024-11-30

## 2024-11-30 RX ORDER — VALACYCLOVIR HYDROCHLORIDE 1 G/1
1000 TABLET, FILM COATED ORAL 3 TIMES DAILY
Qty: 20 TABLET | Refills: 0 | Status: SHIPPED | OUTPATIENT
Start: 2024-11-30 | End: 2024-12-14

## 2024-11-30 RX ORDER — SULFAMETHOXAZOLE AND TRIMETHOPRIM 800; 160 MG/1; MG/1
1 TABLET ORAL ONCE
Status: COMPLETED | OUTPATIENT
Start: 2024-11-30 | End: 2024-11-30

## 2024-11-30 RX ADMIN — SULFAMETHOXAZOLE AND TRIMETHOPRIM 1 TABLET: 800; 160 TABLET ORAL at 18:26

## 2024-11-30 RX ADMIN — VALACYCLOVIR HYDROCHLORIDE 1000 MG: 500 TABLET, FILM COATED ORAL at 18:26

## 2024-11-30 NOTE — ED PROVIDER NOTES
Time reflects when diagnosis was documented in both MDM as applicable and the Disposition within this note       Time User Action Codes Description Comment    11/30/2024  5:56 PM Niko Mcdowell Add [B02.9] Shingles     11/30/2024  5:56 PM Niko Mcdowell Add [Z22.322] MRSA (methicillin resistant staph aureus) culture positive     11/30/2024  5:58 PM Niko Mcdowell Add [L03.211] Facial cellulitis     11/30/2024  6:32 PM Niko Mcdowell Add [I10] Primary hypertension           ED Disposition       ED Disposition   Discharge    Condition   Stable    Date/Time   Sat Nov 30, 2024  5:55 PM    Comment   Vu Mcdaniels discharge to home/self care.                   Assessment & Plan       Medical Decision Making  Presents with a rash on his forehead.  Was seen by urgent care was sent to him here.  He has a slight headache, symptoms started about a week ago.  He noticed a rash on his forehead, very central in location.  It was quite itchy, but quite painful.  And associated with adenopathy in his posterior cervical chain as well as posterior auricular swelling as well.  Denies fever or chills.  Has been under a lot of stress.Patient's blood pressures also been running high.    Differential diagnosis currently includes shingles versus MRSA.  He does wear CPAP every night.  The strap does cover the area of his rash.  Given the swelling and tendernes, and associated adenopathy and leaning towards shingles, although there is just 1 area of involvement after several days now.        Risk  Prescription drug management.  Risk Details: Given 7 days without advancing rash but still significant adenopathy in irritation and discomfort in the area, history of for both MRSA as well as shingles.  Instructed on proper cleaning and keeping covered and avoiding Diverio in the very end.  He has follow-up with his primary care doctor in the near future.  I have increased his losartan from 50 to 100 mg daily, he will monitor this at  home.  Consider meds like valsartan or irbesartan which can be titrated higher doses.    Patient is agreeable to plan.  Currently no significant pain enough to warrant pain medication or gabapentin.             Medications   valACYclovir (VALTREX) tablet 1,000 mg (1,000 mg Oral Given 11/30/24 1826)   sulfamethoxazole-trimethoprim (BACTRIM DS) 800-160 mg per tablet 1 tablet (1 tablet Oral Given 11/30/24 1826)       ED Risk Strat Scores                                               History of Present Illness       Chief Complaint   Patient presents with    Hypertension     Pt reports htn starting today.     Facial Swelling     Pt reports facial swelling and wound on forehead since Monday which prompted pt to go to urgent care. Pt was told it was shingles.        Past Medical History:   Diagnosis Date    Hypercholesterolemia     Hypertension     Prediabetes       Past Surgical History:   Procedure Laterality Date    HERNIA REPAIR Bilateral     REPAIR LABRUM Right       Family History   Problem Relation Age of Onset    Graves' disease Mother       Social History     Tobacco Use    Smoking status: Some Days     Types: Cigars    Smokeless tobacco: Never    Tobacco comments:     Smokes cigar on occasion   Vaping Use    Vaping status: Never Used   Substance Use Topics    Alcohol use: Yes    Drug use: Never      E-Cigarette/Vaping    E-Cigarette Use Never User       E-Cigarette/Vaping Substances    Nicotine No     THC No     CBD No     Flavoring No     Other No     Unknown No       I have reviewed and agree with the history as documented.     Presents with a rash on his forehead.  Was seen by urgent care was sent to him here.  He has a slight headache, symptoms started about a week ago.  He noticed a rash on his forehead, very central in location.  It was quite itchy, but quite painful.  And associated with adenopathy in his posterior cervical chain as well as posterior auricular swelling as well.  Denies fever or chills.   Has been under a lot of stress.Patient's blood pressures also been running high.        Review of Systems   Constitutional:  Negative for chills and fever.   HENT:  Negative for ear pain.    Eyes:  Negative for pain and visual disturbance.   Respiratory:  Negative for cough and shortness of breath.    Cardiovascular:  Negative for chest pain and palpitations.   Gastrointestinal:  Negative for abdominal pain and vomiting.   Genitourinary:  Negative for dysuria and hematuria.   Musculoskeletal:  Positive for arthralgias and back pain.   Skin:  Positive for rash. Negative for color change.   Neurological:  Negative for seizures and syncope.   Psychiatric/Behavioral:  The patient is nervous/anxious.    All other systems reviewed and are negative.          Objective       ED Triage Vitals [11/30/24 1724]   Temperature Pulse Blood Pressure Respirations SpO2 Patient Position - Orthostatic VS   98.3 °F (36.8 °C) 86 154/91 18 96 % Lying      Temp Source Heart Rate Source BP Location FiO2 (%) Pain Score    Tympanic Monitor Left arm -- 6      Vitals      Date and Time Temp Pulse SpO2 Resp BP Pain Score FACES Pain Rating User   11/30/24 1724 98.3 °F (36.8 °C) 86 96 % 18 154/91 6 --             Physical Exam  Vitals and nursing note reviewed.   Constitutional:       General: He is not in acute distress.     Appearance: Normal appearance. He is well-developed.   HENT:      Head: Normocephalic and atraumatic.   Eyes:      Conjunctiva/sclera: Conjunctivae normal.   Cardiovascular:      Rate and Rhythm: Normal rate and regular rhythm.      Pulses: Normal pulses.      Heart sounds: No murmur heard.     Gallop present.   Pulmonary:      Effort: Pulmonary effort is normal. No respiratory distress.      Breath sounds: Normal breath sounds.   Abdominal:      Palpations: Abdomen is soft.      Tenderness: There is no abdominal tenderness.   Musculoskeletal:         General: No swelling.      Cervical back: Neck supple.   Skin:      General: Skin is warm and dry.      Capillary Refill: Capillary refill takes less than 2 seconds.      Findings: Rash present.   Neurological:      General: No focal deficit present.      Mental Status: He is alert and oriented to person, place, and time.   Psychiatric:         Mood and Affect: Mood normal.         Results Reviewed       None            No orders to display       Procedures    ED Medication and Procedure Management   Prior to Admission Medications   Prescriptions Last Dose Informant Patient Reported? Taking?   Alcohol Swabs (Alcohol Prep Pads) 70 % PADS   No No   Sig: CHECK BLOOD SUGARS TWICE DAILY.   Blood Glucose Monitoring Suppl (OneTouch Verio Reflect) w/Device KIT   No No   Sig: Check blood sugars twice daily. Please substitute with appropriate alternative as covered by patient's insurance. Dx: E11.65   OneTouch Delica Lancets 33G MISC   No No   Sig: Check blood sugars twice daily. Please substitute with appropriate alternative as covered by patient's insurance. Dx: E11.65   Trulicity 0.75 MG/0.5ML SOAJ Past Week  Yes Yes   atorvastatin (LIPITOR) 40 mg tablet 11/30/2024 Morning  No Yes   Sig: TAKE 1 TABLET (40 MG TOTAL) BY MOUTH DAILY   dulaglutide (Trulicity) 0.75 MG/0.5ML injection   No No   Sig: Inject 0.5 mL (0.75 mg total) under the skin once a week   glucose blood (OneTouch Verio) test strip   No No   Sig: Check blood sugars twice daily. Please substitute with appropriate alternative as covered by patient's insurance. Dx: E11.65   metFORMIN (GLUCOPHAGE-XR) 500 mg 24 hr tablet   No No   Sig: Take 2 tablets (1,000 mg total) by mouth daily with dinner      Facility-Administered Medications: None     Discharge Medication List as of 11/30/2024  6:32 PM        START taking these medications    Details   losartan (COZAAR) 100 MG tablet Take 1 tablet (100 mg total) by mouth daily, Starting Sat 11/30/2024, Normal      sulfamethoxazole-trimethoprim (BACTRIM DS) 800-160 mg per tablet Take 1 tablet  by mouth 2 (two) times a day for 7 days smx-tmp DS (BACTRIM) 800-160 mg tabs (1tab q12 D10), Starting Sat 11/30/2024, Until Sat 12/7/2024, Normal      valACYclovir (VALTREX) 1,000 mg tablet Take 1 tablet (1,000 mg total) by mouth 3 (three) times a day for 14 days, Starting Sat 11/30/2024, Until Sat 12/14/2024, Normal           CONTINUE these medications which have NOT CHANGED    Details   atorvastatin (LIPITOR) 40 mg tablet TAKE 1 TABLET (40 MG TOTAL) BY MOUTH DAILY, Starting Fri 8/2/2024, Normal      Trulicity 0.75 MG/0.5ML SOAJ Historical Med      Alcohol Swabs (Alcohol Prep Pads) 70 % PADS CHECK BLOOD SUGARS TWICE DAILY., Normal      Blood Glucose Monitoring Suppl (OneTouch Verio Reflect) w/Device KIT Check blood sugars twice daily. Please substitute with appropriate alternative as covered by patient's insurance. Dx: E11.65, Normal      dulaglutide (Trulicity) 0.75 MG/0.5ML injection Inject 0.5 mL (0.75 mg total) under the skin once a week, Starting Thu 9/5/2024, Normal      glucose blood (OneTouch Verio) test strip Check blood sugars twice daily. Please substitute with appropriate alternative as covered by patient's insurance. Dx: E11.65, Normal      metFORMIN (GLUCOPHAGE-XR) 500 mg 24 hr tablet Take 2 tablets (1,000 mg total) by mouth daily with dinner, Starting Thu 11/7/2024, Until Wed 2/5/2025, Normal      OneTouch Delica Lancets 33G MISC Check blood sugars twice daily. Please substitute with appropriate alternative as covered by patient's insurance. Dx: E11.65, Normal           No discharge procedures on file.  ED SEPSIS DOCUMENTATION   Time reflects when diagnosis was documented in both MDM as applicable and the Disposition within this note       Time User Action Codes Description Comment    11/30/2024  5:56 PM Niko Mdcowell [B02.9] Shingles     11/30/2024  5:56 PM Niko Mcdowell Add [Z22.322] MRSA (methicillin resistant staph aureus) culture positive     11/30/2024  5:58 PM Niko Mcdowell Add  [L03.211] Facial cellulitis     11/30/2024  6:32 PM Niko Mcdowell Add [I10] Primary hypertension                  Niko Mcdowell,   11/30/24 1829       Niko Mcdowell DO  11/30/24 2145

## 2024-11-30 NOTE — PROGRESS NOTES
[facility]        NAME: Vu Mcdaniels is a 53 y.o. male  : 1970    MRN: [id number]  DATE: 2024  TIME: 4:40 PM    Assessment and Plan   Elevated blood pressure reading [R03.0]  1. Elevated blood pressure reading  Transfer to other facility      2. Herpes zoster without complication        3. Chest tightness  Transfer to other facility        Patient sent to the ED for further evaluation.  EKG: NSR, No acute ST/T wave abnormalities.  Pt is not currently experiencing any chest pain/tightness, pt will transport self to the ED.    Patient Instructions     ED for further evaluation.    If tests have been performed at [facility], our office will contact you with results if changes need to be made to the care plan discussed with you at the visit.  [facility]    Chief Complaint     Chief Complaint   Patient presents with    Sinusitis     Sinus pain and swelling in face starting  mildly congested this past week, no fevers.     Hypertension     Blood pressure has been elevated this morning 145/109 1030 am.           History of Present Illness       Patient is a 53 year old male presenting to [facility] with sinus pain/pressure and rash on forehead.  Symptoms began about 4-5 days ago.  There is Associated congestion.  There has been no fevers reported.  Patient also was concerned due to blood pressure being slightly elevated this morning 145/109.  Current blood pressure 162/106.   Patient noticed there was swelling on forehead and took blood pressure w/ headache.  Pt does still have a headache at this time.  Pt denies any visual change, dizziness or loss of equilibrium.     Patient did endorse chest tightness two days ago.  The tightness lasted for a few minutes before subsiding.  Pt has no known cardiac issues.     Sinusitis  This is a new problem. The current episode started in the past 7 days. The problem has been gradually worsening  since onset. There has been no fever. Associated symptoms include congestion, coughing and sinus pressure. Pertinent negatives include no chills, ear pain, shortness of breath or sore throat.   Hypertension  Pertinent negatives include no chest pain, palpitations or shortness of breath.       Review of Systems   Review of Systems   Constitutional:  Negative for chills and fever.   HENT:  Positive for congestion and sinus pressure. Negative for ear pain and sore throat.    Eyes:  Negative for pain and visual disturbance.   Respiratory:  Positive for cough. Negative for shortness of breath.    Cardiovascular:  Negative for chest pain and palpitations.   Gastrointestinal:  Negative for abdominal pain and vomiting.   Genitourinary:  Negative for dysuria and hematuria.   Musculoskeletal:  Negative for arthralgias and back pain.   Skin:  Negative for color change and rash.   Neurological:  Negative for seizures and syncope.   All other systems reviewed and are negative.        Current Medications       Current Outpatient Medications:     Alcohol Swabs (Alcohol Prep Pads) 70 % PADS, CHECK BLOOD SUGARS TWICE DAILY., Disp: 200 each, Rfl: 0    atorvastatin (LIPITOR) 40 mg tablet, TAKE 1 TABLET (40 MG TOTAL) BY MOUTH DAILY, Disp: 100 tablet, Rfl: 1    Blood Glucose Monitoring Suppl (OneTouch Verio Reflect) w/Device KIT, Check blood sugars twice daily. Please substitute with appropriate alternative as covered by patient's insurance. Dx: E11.65, Disp: 1 kit, Rfl: 0    dulaglutide (Trulicity) 0.75 MG/0.5ML injection, Inject 0.5 mL (0.75 mg total) under the skin once a week, Disp: 6 mL, Rfl: 1    glucose blood (OneTouch Verio) test strip, Check blood sugars twice daily. Please substitute with appropriate alternative as covered by patient's insurance. Dx: E11.65, Disp: 200 each, Rfl: 3    losartan (COZAAR) 50 mg tablet, TAKE 1 TABLET (50 MG TOTAL) BY MOUTH DAILY, Disp: 100 tablet, Rfl: 1    metFORMIN (GLUCOPHAGE-XR) 500 mg 24 hr  "tablet, Take 2 tablets (1,000 mg total) by mouth daily with dinner, Disp: 60 tablet, Rfl: 2    OneTouch Delica Lancets 33G MISC, Check blood sugars twice daily. Please substitute with appropriate alternative as covered by patient's insurance. Dx: E11.65, Disp: 200 each, Rfl: 3    Trulicity 0.75 MG/0.5ML SOAJ, , Disp: , Rfl:     Current Allergies     Allergies as of 11/30/2024 - Reviewed 11/30/2024   Allergen Reaction Noted    Oxycodone-acetaminophen Syncope 05/12/2015            The following portions of the patient's history were reviewed and updated as appropriate: allergies, current medications, past family history, past medical history, past social history, past surgical history and problem list.     Past Medical History:   Diagnosis Date    Hypercholesterolemia     Hypertension     Prediabetes        Past Surgical History:   Procedure Laterality Date    HERNIA REPAIR Bilateral     REPAIR LABRUM Right        Family History   Problem Relation Age of Onset    Graves' disease Mother          Medications have been verified.        Objective   BP (!) 168/108 (BP Location: Left arm, Patient Position: Sitting, Cuff Size: Large) Comment: rui  Pulse 82   Temp 98.7 °F (37.1 °C)   Resp 20   Ht 5' 7\" (1.702 m)   Wt 127 kg (279 lb)   SpO2 97%   BMI 43.70 kg/m²   No LMP for male patient.       Physical Exam     Physical Exam  Constitutional:       Appearance: Normal appearance. He is normal weight.   HENT:      Head: Normocephalic and atraumatic.        Nose: Congestion present.      Mouth/Throat:      Mouth: Mucous membranes are moist.   Eyes:      Extraocular Movements: Extraocular movements intact.      Conjunctiva/sclera: Conjunctivae normal.      Pupils: Pupils are equal, round, and reactive to light.   Cardiovascular:      Rate and Rhythm: Normal rate and regular rhythm.      Heart sounds: No murmur heard.     No friction rub. No gallop.   Pulmonary:      Effort: Pulmonary effort is normal.      Breath sounds: " No wheezing, rhonchi or rales.   Musculoskeletal:         General: Normal range of motion.      Cervical back: Normal range of motion and neck supple.   Skin:     General: Skin is warm and dry.   Neurological:      General: No focal deficit present.      Mental Status: He is alert and oriented to person, place, and time.   Psychiatric:         Mood and Affect: Mood normal.         Behavior: Behavior normal.

## 2024-11-30 NOTE — DISCHARGE INSTRUCTIONS
Increase your losartan to 100 mg daily.  Monitor blood pressure at home.  Take Valtrex 1000 mg 3 times a day for 7 days  Take Bactrim DS twice daily for 7 days  Keep rash covered under your CPAP, and with company.  Return if symptoms worsen, or rash worsens.

## 2024-12-01 LAB
ATRIAL RATE: 81 BPM
ATRIAL RATE: 81 BPM
P AXIS: 50 DEGREES
P AXIS: 50 DEGREES
PR INTERVAL: 156 MS
PR INTERVAL: 156 MS
QRS AXIS: -5 DEGREES
QRS AXIS: -5 DEGREES
QRSD INTERVAL: 92 MS
QRSD INTERVAL: 92 MS
QT INTERVAL: 378 MS
QT INTERVAL: 378 MS
QTC INTERVAL: 439 MS
QTC INTERVAL: 439 MS
T WAVE AXIS: 35 DEGREES
T WAVE AXIS: 35 DEGREES
VENTRICULAR RATE: 81 BPM
VENTRICULAR RATE: 81 BPM

## 2024-12-01 PROCEDURE — 93010 ELECTROCARDIOGRAM REPORT: CPT | Performed by: INTERNAL MEDICINE

## 2024-12-03 ENCOUNTER — TELEPHONE (OUTPATIENT)
Age: 54
End: 2024-12-03

## 2024-12-03 NOTE — TELEPHONE ENCOUNTER
WHO - patient     WHAT - shingles     WHEN - started 11/23/24    How Often/Duration -    Pain -    Alleviating Factors (anything they tried to use to help so far) - went ER to 11/30/24 given valtrex and bactrim    Next Steps - wants to know how long he will be contagious, if he should cancel is Rachel plans. Please advise.    Callback- patient

## 2024-12-04 NOTE — TELEPHONE ENCOUNTER
If the lesions are open and oozing he is contagious.  If they are dry and scabbed over he is not contagious

## 2024-12-10 ENCOUNTER — OFFICE VISIT (OUTPATIENT)
Dept: FAMILY MEDICINE CLINIC | Facility: CLINIC | Age: 54
End: 2024-12-10
Payer: COMMERCIAL

## 2024-12-10 VITALS
HEIGHT: 67 IN | DIASTOLIC BLOOD PRESSURE: 78 MMHG | HEART RATE: 90 BPM | BODY MASS INDEX: 43.6 KG/M2 | TEMPERATURE: 97.4 F | OXYGEN SATURATION: 98 % | WEIGHT: 277.8 LBS | SYSTOLIC BLOOD PRESSURE: 130 MMHG

## 2024-12-10 DIAGNOSIS — I15.9 SECONDARY HYPERTENSION: ICD-10-CM

## 2024-12-10 DIAGNOSIS — R19.5 POSITIVE COLORECTAL CANCER SCREENING USING COLOGUARD TEST: ICD-10-CM

## 2024-12-10 DIAGNOSIS — E11.69 TYPE 2 DIABETES MELLITUS WITH OTHER SPECIFIED COMPLICATION, WITHOUT LONG-TERM CURRENT USE OF INSULIN (HCC): Primary | ICD-10-CM

## 2024-12-10 DIAGNOSIS — R00.2 PALPITATIONS: ICD-10-CM

## 2024-12-10 LAB — SL AMB POCT HEMOGLOBIN AIC: 6.5 (ref ?–6.5)

## 2024-12-10 PROCEDURE — 83036 HEMOGLOBIN GLYCOSYLATED A1C: CPT | Performed by: STUDENT IN AN ORGANIZED HEALTH CARE EDUCATION/TRAINING PROGRAM

## 2024-12-10 PROCEDURE — 99214 OFFICE O/P EST MOD 30 MIN: CPT | Performed by: STUDENT IN AN ORGANIZED HEALTH CARE EDUCATION/TRAINING PROGRAM

## 2024-12-10 NOTE — ASSESSMENT & PLAN NOTE
Lab Results   Component Value Date    HGBA1C 6.5 12/10/2024     Current hba1c 6.5  Continue with current regiment   Continue Trulicity and metfromin     Orders:    POCT hemoglobin A1c

## 2024-12-10 NOTE — PROGRESS NOTES
Name: Vu Mcdaniels      : 1970      MRN: [id number]  Encounter Provider: Markos Yousif MD  Encounter Date: 12/10/2024   Encounter department: [facility] PRIMARY [facility]  :  Assessment & Plan  Type 2 diabetes mellitus with other specified complication, without long-term current use of insulin (HCC)    Lab Results   Component Value Date    HGBA1C 6.5 12/10/2024     Current hba1c 6.5  Continue with current regiment   Continue Trulicity and metfromin     Orders:    POCT hemoglobin A1c    Secondary hypertension  Well controlled   Continue losartan          Palpitations  Reports palpatations several times a week  Recent EKG normal   Will order holter     Orders:    Holter monitor; Future    Positive colorectal cancer screening using Cologuard test    Orders:    Ambulatory Referral to Gastroenterology; Future           History of Present Illness     HPI      This is a 54 y.o. male who presents to the office for routine follow-up of chronic medical conditions.  Overall they report feeling well they deny any significant interval history since her last appointment.  They deny any new issues and they are taking her prescribed medications as instructed without any side effects or concerns.              Review of Systems   Constitutional:  Negative for activity change, appetite change, chills, fatigue and fever.   HENT:  Negative for congestion, dental problem, drooling, ear discharge, ear pain, facial swelling, postnasal drip, rhinorrhea and sinus pain.    Eyes:  Negative for photophobia, pain, discharge and itching.   Respiratory:  Negative for apnea, cough, chest tightness and shortness of breath.    Cardiovascular:  Negative for chest pain and leg swelling.   Gastrointestinal:  Negative for abdominal distention, abdominal pain, anal bleeding, constipation, diarrhea and nausea.   Endocrine: Negative for cold intolerance, heat intolerance and polydipsia.   Genitourinary:  Negative for difficulty  "urinating.   Musculoskeletal:  Negative for arthralgias, gait problem, joint swelling and myalgias.   Skin:  Negative for color change and pallor.   Allergic/Immunologic: Negative for immunocompromised state.   Neurological:  Negative for dizziness, seizures, facial asymmetry, weakness, light-headedness, numbness and headaches.   Psychiatric/Behavioral:  Negative for agitation, behavioral problems, confusion, decreased concentration and dysphoric mood.    All other systems reviewed and are negative.      Objective   /78 (BP Location: Left arm, Patient Position: Sitting, Cuff Size: Adult)   Pulse 90   Temp (!) 97.4 °F (36.3 °C) (Tympanic)   Ht 5' 7\" (1.702 m)   Wt 126 kg (277 lb 12.8 oz)   SpO2 98%   BMI 43.51 kg/m²      Physical Exam  Constitutional:       Appearance: He is well-developed.   HENT:      Head: Normocephalic.   Eyes:      Pupils: Pupils are equal, round, and reactive to light.   Cardiovascular:      Rate and Rhythm: Normal rate and regular rhythm.   Pulmonary:      Effort: Pulmonary effort is normal.      Breath sounds: Normal breath sounds.   Abdominal:      General: Bowel sounds are normal.      Palpations: Abdomen is soft.   Musculoskeletal:         General: Normal range of motion.      Cervical back: Normal range of motion and neck supple.   Skin:     General: Skin is warm.         "

## 2024-12-17 DIAGNOSIS — E11.69 TYPE 2 DIABETES MELLITUS WITH OTHER SPECIFIED COMPLICATION, WITHOUT LONG-TERM CURRENT USE OF INSULIN (HCC): ICD-10-CM

## 2024-12-18 RX ORDER — DULAGLUTIDE 0.75 MG/.5ML
INJECTION, SOLUTION SUBCUTANEOUS
Qty: 6 ML | Refills: 1 | Status: SHIPPED | OUTPATIENT
Start: 2024-12-18

## 2024-12-30 PROCEDURE — 99284 EMERGENCY DEPT VISIT MOD MDM: CPT

## 2024-12-31 ENCOUNTER — HOSPITAL ENCOUNTER (EMERGENCY)
Facility: HOSPITAL | Age: 54
Discharge: HOME | End: 2024-12-31
Attending: EMERGENCY MEDICINE
Payer: COMMERCIAL

## 2024-12-31 VITALS
HEART RATE: 81 BPM | DIASTOLIC BLOOD PRESSURE: 87 MMHG | TEMPERATURE: 98.5 F | SYSTOLIC BLOOD PRESSURE: 161 MMHG | OXYGEN SATURATION: 96 % | RESPIRATION RATE: 18 BRPM

## 2024-12-31 DIAGNOSIS — R03.0 ELEVATED BLOOD PRESSURE READING: Primary | ICD-10-CM

## 2024-12-31 LAB
ANION GAP SERPL CALCULATED.3IONS-SCNC: 7 MMOL/L (ref 4–13)
ATRIAL RATE: 82 BPM
BASOPHILS # BLD AUTO: 0.07 THOUSANDS/ΜL (ref 0–0.1)
BASOPHILS NFR BLD AUTO: 1 % (ref 0–1)
BUN SERPL-MCNC: 18 MG/DL (ref 5–25)
CALCIUM SERPL-MCNC: 9.5 MG/DL (ref 8.4–10.2)
CHLORIDE SERPL-SCNC: 104 MMOL/L (ref 96–108)
CO2 SERPL-SCNC: 28 MMOL/L (ref 21–32)
CREAT SERPL-MCNC: 0.95 MG/DL (ref 0.6–1.3)
EOSINOPHIL # BLD AUTO: 0.11 THOUSAND/ΜL (ref 0–0.61)
EOSINOPHIL NFR BLD AUTO: 1 % (ref 0–6)
ERYTHROCYTE [DISTWIDTH] IN BLOOD BY AUTOMATED COUNT: 14.3 % (ref 11.6–15.1)
GFR SERPL CREATININE-BSD FRML MDRD: 89 ML/MIN/1.73SQ M
GLUCOSE SERPL-MCNC: 112 MG/DL (ref 65–140)
HCT VFR BLD AUTO: 45 % (ref 36.5–49.3)
HGB BLD-MCNC: 15.3 G/DL (ref 12–17)
IMM GRANULOCYTES # BLD AUTO: 0.04 THOUSAND/UL (ref 0–0.2)
IMM GRANULOCYTES NFR BLD AUTO: 0 % (ref 0–2)
LYMPHOCYTES # BLD AUTO: 1.84 THOUSANDS/ΜL (ref 0.6–4.47)
LYMPHOCYTES NFR BLD AUTO: 19 % (ref 14–44)
MCH RBC QN AUTO: 30.5 PG (ref 26.8–34.3)
MCHC RBC AUTO-ENTMCNC: 34 G/DL (ref 31.4–37.4)
MCV RBC AUTO: 90 FL (ref 82–98)
MONOCYTES # BLD AUTO: 0.79 THOUSAND/ΜL (ref 0.17–1.22)
MONOCYTES NFR BLD AUTO: 8 % (ref 4–12)
NEUTROPHILS # BLD AUTO: 6.72 THOUSANDS/ΜL (ref 1.85–7.62)
NEUTS SEG NFR BLD AUTO: 71 % (ref 43–75)
NRBC BLD AUTO-RTO: 0 /100 WBCS
P AXIS: 49 DEGREES
PLATELET # BLD AUTO: 251 THOUSANDS/UL (ref 149–390)
PMV BLD AUTO: 9.6 FL (ref 8.9–12.7)
POTASSIUM SERPL-SCNC: 3.9 MMOL/L (ref 3.5–5.3)
PR INTERVAL: 158 MS
QRS AXIS: -1 DEGREES
QRSD INTERVAL: 86 MS
QT INTERVAL: 378 MS
QTC INTERVAL: 441 MS
RBC # BLD AUTO: 5.01 MILLION/UL (ref 3.88–5.62)
SODIUM SERPL-SCNC: 139 MMOL/L (ref 135–147)
T WAVE AXIS: 43 DEGREES
VENTRICULAR RATE: 82 BPM
WBC # BLD AUTO: 9.57 THOUSAND/UL (ref 4.31–10.16)

## 2024-12-31 PROCEDURE — 99284 EMERGENCY DEPT VISIT MOD MDM: CPT | Performed by: EMERGENCY MEDICINE

## 2024-12-31 PROCEDURE — 36415 COLL VENOUS BLD VENIPUNCTURE: CPT | Performed by: EMERGENCY MEDICINE

## 2024-12-31 PROCEDURE — 93005 ELECTROCARDIOGRAM TRACING: CPT

## 2024-12-31 PROCEDURE — 93010 ELECTROCARDIOGRAM REPORT: CPT | Performed by: INTERNAL MEDICINE

## 2024-12-31 PROCEDURE — 80048 BASIC METABOLIC PNL TOTAL CA: CPT | Performed by: EMERGENCY MEDICINE

## 2024-12-31 PROCEDURE — 85025 COMPLETE CBC W/AUTO DIFF WBC: CPT | Performed by: EMERGENCY MEDICINE

## 2024-12-31 NOTE — ED PROVIDER NOTES
Time reflects when diagnosis was documented in both MDM as applicable and the Disposition within this note       Time User Action Codes Description Comment    12/31/2024  2:21 AM Denny Nichols Add [R03.0] Elevated blood pressure reading           ED Disposition       ED Disposition   Discharge    Condition   Stable    Date/Time   Tue Dec 31, 2024  2:21 AM    Comment   Vu Enedelia discharge to home/self care.                   Assessment & Plan       Medical Decision Making  54-year-old male presenting for elevated blood pressure.  Has been in the 150s to 160s throughout the day.  Blood pressure medication was increased to losartan 100 mg on Thanksgiving.  Denies any symptoms at this time.  Blood pressure 161/87 here.  Had discussion with the patient explained him he is having asymptomatic hypertension.  Offered basic blood work and EKG which she agreed to.  Blood work and EKG within normal limits.  Patient told to follow-up with his family doctor tomorrow in regards to his blood pressure.    Problems Addressed:  Elevated blood pressure reading: acute illness or injury    Amount and/or Complexity of Data Reviewed  Labs: ordered.             Medications - No data to display    ED Risk Strat Scores                                              History of Present Illness       Chief Complaint   Patient presents with    Hypertension     Pt reports high blood pressures in the 150s-160s throughout the day        Past Medical History:   Diagnosis Date    Hypercholesterolemia     Hypertension     Prediabetes       Past Surgical History:   Procedure Laterality Date    HERNIA REPAIR Bilateral     REPAIR LABRUM Right       Family History   Problem Relation Age of Onset    Graves' disease Mother       Social History     Tobacco Use    Smoking status: Some Days     Types: Cigars     Passive exposure: Never    Smokeless tobacco: Never    Tobacco comments:     Smokes cigar on occasion   Vaping Use    Vaping status: Never Used    Substance Use Topics    Alcohol use: Yes    Drug use: Never      E-Cigarette/Vaping    E-Cigarette Use Never User       E-Cigarette/Vaping Substances    Nicotine No     THC No     CBD No     Flavoring No     Other No     Unknown No       I have reviewed and agree with the history as documented.     Patient is a 54-year-old male who presents for evaluation of elevated blood pressure.  Patient says today his blood pressure has been in the 150s 160s with diastolics in the 100s.  Patient denies any symptoms associated with it.  Denies any headache, visual changes, nausea, vomiting, chest pain, shortness of breath.  Has been compliant on his medications.  Says his losartan was increased to 100 mg daily around Thanksgiving.        Review of Systems   Constitutional:  Negative for chills, fever and unexpected weight change.   HENT:  Negative for congestion, sore throat and trouble swallowing.    Eyes:  Negative for pain, discharge and itching.   Respiratory:  Negative for cough, chest tightness, shortness of breath and wheezing.    Cardiovascular:  Negative for chest pain, palpitations and leg swelling.   Gastrointestinal:  Negative for abdominal pain, blood in stool, diarrhea, nausea and vomiting.   Endocrine: Negative for polyuria.   Genitourinary:  Negative for difficulty urinating, dysuria, frequency and hematuria.   Musculoskeletal:  Negative for arthralgias and back pain.   Neurological:  Negative for dizziness, syncope, weakness, light-headedness and headaches.           Objective       ED Triage Vitals [12/31/24 0047]   Temperature Pulse Blood Pressure Respirations SpO2 Patient Position - Orthostatic VS   98.5 °F (36.9 °C) 85 (!) 185/97 18 95 % --      Temp Source Heart Rate Source BP Location FiO2 (%) Pain Score    Oral Monitor Left arm -- No Pain      Vitals      Date and Time Temp Pulse SpO2 Resp BP Pain Score FACES Pain Rating User   12/31/24 0115 -- 81 96 % 18 161/87 -- -- KB   12/31/24 0114 -- -- -- --  161/87 -- -- NR   12/31/24 0047 98.5 °F (36.9 °C) 85 95 % 18 185/97 No Pain -- MD            Physical Exam  Vitals and nursing note reviewed.   Constitutional:       General: He is not in acute distress.     Appearance: He is well-developed.   HENT:      Head: Normocephalic and atraumatic.      Right Ear: External ear normal.      Left Ear: External ear normal.   Eyes:      Conjunctiva/sclera: Conjunctivae normal.      Pupils: Pupils are equal, round, and reactive to light.   Cardiovascular:      Rate and Rhythm: Normal rate and regular rhythm.      Heart sounds: Normal heart sounds. No murmur heard.     No friction rub. No gallop.   Pulmonary:      Effort: Pulmonary effort is normal. No respiratory distress.      Breath sounds: Normal breath sounds. No wheezing or rales.   Abdominal:      General: Bowel sounds are normal. There is no distension.      Palpations: Abdomen is soft.      Tenderness: There is no abdominal tenderness. There is no guarding.   Musculoskeletal:         General: No tenderness or deformity. Normal range of motion.      Cervical back: Normal range of motion.   Lymphadenopathy:      Cervical: No cervical adenopathy.   Skin:     General: Skin is warm and dry.   Neurological:      General: No focal deficit present.      Mental Status: He is alert and oriented to person, place, and time. Mental status is at baseline.      Cranial Nerves: No cranial nerve deficit.      Sensory: No sensory deficit.      Motor: No weakness or abnormal muscle tone.   Psychiatric:         Behavior: Behavior normal.         Results Reviewed       Procedure Component Value Units Date/Time    Basic metabolic panel [[id number]] Collected: 12/31/24 0153    Lab Status: Final result Specimen: Blood from Hand, Left Updated: 12/31/24 0216     Sodium 139 mmol/L      Potassium 3.9 mmol/L      Chloride 104 mmol/L      CO2 28 mmol/L      ANION GAP 7 mmol/L      BUN 18 mg/dL      Creatinine 0.95 mg/dL      Glucose 112 mg/dL       Calcium 9.5 mg/dL      eGFR 89 ml/min/1.73sq m     Narrative:      National Kidney Disease Foundation guidelines for Chronic Kidney Disease (CKD):     Stage 1 with normal or high GFR (GFR > 90 mL/min/1.73 square meters)    Stage 2 Mild CKD (GFR = 60-89 mL/min/1.73 square meters)    Stage 3A Moderate CKD (GFR = 45-59 mL/min/1.73 square meters)    Stage 3B Moderate CKD (GFR = 30-44 mL/min/1.73 square meters)    Stage 4 Severe CKD (GFR = 15-29 mL/min/1.73 square meters)    Stage 5 End Stage CKD (GFR <15 mL/min/1.73 square meters)  Note: GFR calculation is accurate only with a steady state creatinine    CBC and differential [[id number]] Collected: 12/31/24 0137    Lab Status: Final result Specimen: Blood from Arm, Left Updated: 12/31/24 0142     WBC 9.57 Thousand/uL      RBC 5.01 Million/uL      Hemoglobin 15.3 g/dL      Hematocrit 45.0 %      MCV 90 fL      MCH 30.5 pg      MCHC 34.0 g/dL      RDW 14.3 %      MPV 9.6 fL      Platelets 251 Thousands/uL      nRBC 0 /100 WBCs      Segmented % 71 %      Immature Grans % 0 %      Lymphocytes % 19 %      Monocytes % 8 %      Eosinophils Relative 1 %      Basophils Relative 1 %      Absolute Neutrophils 6.72 Thousands/µL      Absolute Immature Grans 0.04 Thousand/uL      Absolute Lymphocytes 1.84 Thousands/µL      Absolute Monocytes 0.79 Thousand/µL      Eosinophils Absolute 0.11 Thousand/µL      Basophils Absolute 0.07 Thousands/µL             No orders to display       Procedures    ED Medication and Procedure Management   Prior to Admission Medications   Prescriptions Last Dose Informant Patient Reported? Taking?   Alcohol Swabs (Alcohol Prep Pads) 70 % PADS   No No   Sig: CHECK BLOOD SUGARS TWICE DAILY.   Blood Glucose Monitoring Suppl (OneTouch Verio Reflect) w/Device KIT   No No   Sig: Check blood sugars twice daily. Please substitute with appropriate alternative as covered by patient's insurance. Dx: E11.65   Dulaglutide (Trulicity) 0.75 MG/0.5ML SOAJ   No No   Sig:  INJECT 0.5 ML (0.75 MG TOTAL) UNDER THE SKIN ONCE A WEEK   OneTouch Delica Lancets 33G MISC   No No   Sig: Check blood sugars twice daily. Please substitute with appropriate alternative as covered by patient's insurance. Dx: E11.65   Trulicity 0.75 MG/0.5ML SOAJ   Yes No   atorvastatin (LIPITOR) 40 mg tablet   No No   Sig: TAKE 1 TABLET (40 MG TOTAL) BY MOUTH DAILY   glucose blood (OneTouch Verio) test strip   No No   Sig: Check blood sugars twice daily. Please substitute with appropriate alternative as covered by patient's insurance. Dx: E11.65   losartan (COZAAR) 100 MG tablet   No No   Sig: Take 1 tablet (100 mg total) by mouth daily   metFORMIN (GLUCOPHAGE-XR) 500 mg 24 hr tablet   No No   Sig: Take 2 tablets (1,000 mg total) by mouth daily with dinner   valACYclovir (VALTREX) 1,000 mg tablet   No No   Sig: Take 1 tablet (1,000 mg total) by mouth 3 (three) times a day for 14 days   Patient not taking: Reported on 12/10/2024      Facility-Administered Medications: None     Discharge Medication List as of 12/31/2024  2:21 AM        CONTINUE these medications which have NOT CHANGED    Details   Alcohol Swabs (Alcohol Prep Pads) 70 % PADS CHECK BLOOD SUGARS TWICE DAILY., Normal      atorvastatin (LIPITOR) 40 mg tablet TAKE 1 TABLET (40 MG TOTAL) BY MOUTH DAILY, Starting Fri 8/2/2024, Normal      Blood Glucose Monitoring Suppl (OneTouch Verio Reflect) w/Device KIT Check blood sugars twice daily. Please substitute with appropriate alternative as covered by patient's insurance. Dx: E11.65, Normal      !! Dulaglutide (Trulicity) 0.75 MG/0.5ML SOAJ INJECT 0.5 ML (0.75 MG TOTAL) UNDER THE SKIN ONCE A WEEK, Normal      glucose blood (OneTouch Verio) test strip Check blood sugars twice daily. Please substitute with appropriate alternative as covered by patient's insurance. Dx: E11.65, Normal      losartan (COZAAR) 100 MG tablet Take 1 tablet (100 mg total) by mouth daily, Starting Sat 11/30/2024, Normal      metFORMIN  (GLUCOPHAGE-XR) 500 mg 24 hr tablet Take 2 tablets (1,000 mg total) by mouth daily with dinner, Starting Thu 11/7/2024, Until Wed 2/5/2025, Normal      OneTouch Delica Lancets 33G MISC Check blood sugars twice daily. Please substitute with appropriate alternative as covered by patient's insurance. Dx: E11.65, Normal      !! Trulicity 0.75 MG/0.5ML SOAJ Historical Med      valACYclovir (VALTREX) 1,000 mg tablet Take 1 tablet (1,000 mg total) by mouth 3 (three) times a day for 14 days, Starting Sat 11/30/2024, Until Sat 12/14/2024, Normal       !! - Potential duplicate medications found. Please discuss with provider.        No discharge procedures on file.  ED SEPSIS DOCUMENTATION   Time reflects when diagnosis was documented in both MDM as applicable and the Disposition within this note       Time User Action Codes Description Comment    12/31/2024  2:21 AM Denny Nichols Add [R03.0] Elevated blood pressure reading                  Denny Nichols,   12/31/24 0441

## 2025-01-09 ENCOUNTER — RESULTS FOLLOW-UP (OUTPATIENT)
Dept: FAMILY MEDICINE CLINIC | Facility: CLINIC | Age: 55
End: 2025-01-09

## 2025-01-09 ENCOUNTER — OFFICE VISIT (OUTPATIENT)
Dept: FAMILY MEDICINE CLINIC | Facility: CLINIC | Age: 55
End: 2025-01-09
Payer: COMMERCIAL

## 2025-01-09 ENCOUNTER — APPOINTMENT (OUTPATIENT)
Dept: RADIOLOGY | Facility: CLINIC | Age: 55
End: 2025-01-09
Payer: COMMERCIAL

## 2025-01-09 VITALS
SYSTOLIC BLOOD PRESSURE: 148 MMHG | HEIGHT: 67 IN | HEART RATE: 94 BPM | OXYGEN SATURATION: 97 % | WEIGHT: 276 LBS | BODY MASS INDEX: 43.32 KG/M2 | DIASTOLIC BLOOD PRESSURE: 96 MMHG

## 2025-01-09 DIAGNOSIS — M25.511 ACUTE PAIN OF RIGHT SHOULDER: ICD-10-CM

## 2025-01-09 DIAGNOSIS — I10 HYPERTENSION, UNSPECIFIED TYPE: Primary | ICD-10-CM

## 2025-01-09 DIAGNOSIS — E11.69 TYPE 2 DIABETES MELLITUS WITH OTHER SPECIFIED COMPLICATION, WITHOUT LONG-TERM CURRENT USE OF INSULIN (HCC): ICD-10-CM

## 2025-01-09 PROCEDURE — 73030 X-RAY EXAM OF SHOULDER: CPT

## 2025-01-09 PROCEDURE — 99214 OFFICE O/P EST MOD 30 MIN: CPT | Performed by: STUDENT IN AN ORGANIZED HEALTH CARE EDUCATION/TRAINING PROGRAM

## 2025-01-09 RX ORDER — LOSARTAN POTASSIUM AND HYDROCHLOROTHIAZIDE 25; 100 MG/1; MG/1
1 TABLET ORAL DAILY
Qty: 30 TABLET | Refills: 0 | Status: SHIPPED | OUTPATIENT
Start: 2025-01-09

## 2025-01-09 NOTE — ASSESSMENT & PLAN NOTE
Lab Results   Component Value Date    HGBA1C 6.5 12/10/2024     Current hba1c 6.5  Continue with current regiment   Continue Trulicity and metfromin

## 2025-01-09 NOTE — PROGRESS NOTES
Name: Vu Mcdaniels      : 1970      MRN: [id number]  Encounter Provider: Markos Yousif MD  Encounter Date: 2025   Encounter department: [facility]  :  Assessment & Plan  Hypertension, unspecified type  Currently on losartan 100 mg  Will start hyzaar  F/u 1 week for BP check    Orders:    losartan-hydrochlorothiazide (HYZAAR) 100-25 MG per tablet; Take 1 tablet by mouth daily    Type 2 diabetes mellitus with other specified complication, without long-term current use of insulin (Formerly [facility])    Lab Results   Component Value Date    HGBA1C 6.5 12/10/2024     Current hba1c 6.5  Continue with current regiment   Continue Trulicity and metfromin              Acute pain of right shoulder  S/p fall 2 weeks  Still painful   Will xray     Orders:    XR shoulder 2+ vw right; Future         History of Present Illness     HPI    54-year-old male presents to the office as ER follow-up.  Patient noted that he was having some headaches checked his BP which was noted to be high and presented to the emergency department.  Upon arrival to the emergency department he was asymptomatic and he was discharged home without any additional changes in his medication.  Patient presents to the office today for further discussion.    Also reports falling on right arm 2 weeks ago which remains painful with movement     Review of Systems   Constitutional:  Negative for activity change, appetite change, chills, fatigue and fever.   HENT:  Negative for congestion, dental problem, drooling, ear discharge, ear pain, facial swelling, postnasal drip, rhinorrhea and sinus pain.    Eyes:  Negative for photophobia, pain, discharge and itching.   Respiratory:  Negative for apnea, cough, chest tightness and shortness of breath.    Cardiovascular:  Negative for chest pain and leg swelling.   Gastrointestinal:  Negative for abdominal distention, abdominal pain, anal bleeding, constipation, diarrhea and nausea.   Endocrine:  "Negative for cold intolerance, heat intolerance and polydipsia.   Genitourinary:  Negative for difficulty urinating.   Musculoskeletal:  Negative for arthralgias, gait problem, joint swelling and myalgias.   Skin:  Negative for color change and pallor.   Allergic/Immunologic: Negative for immunocompromised state.   Neurological:  Negative for dizziness, seizures, facial asymmetry, weakness, light-headedness, numbness and headaches.   Psychiatric/Behavioral:  Negative for agitation, behavioral problems, confusion, decreased concentration and dysphoric mood.    All other systems reviewed and are negative.      Objective   /96 (BP Location: Left arm, Patient Position: Sitting, Cuff Size: Extra-Large)   Pulse 94   Ht 5' 7\" (1.702 m)   Wt 125 kg (276 lb)   SpO2 97%   BMI 43.23 kg/m²      Physical Exam  Constitutional:       Appearance: He is well-developed.   HENT:      Head: Normocephalic.   Eyes:      Pupils: Pupils are equal, round, and reactive to light.   Cardiovascular:      Rate and Rhythm: Normal rate and regular rhythm.   Pulmonary:      Effort: Pulmonary effort is normal.      Breath sounds: Normal breath sounds.   Abdominal:      General: Bowel sounds are normal.      Palpations: Abdomen is soft.   Musculoskeletal:         General: Normal range of motion.      Cervical back: Normal range of motion and neck supple.      Comments: Right shoulder: Full range of motion both active and passive.  There is tenderness palpation along the medial insertion point of the deltoid.  Negative empty can test, negative Hawking's test he test negative painful arc.   Skin:     General: Skin is warm.         "

## 2025-01-16 ENCOUNTER — CLINICAL SUPPORT (OUTPATIENT)
Dept: FAMILY MEDICINE CLINIC | Facility: CLINIC | Age: 55
End: 2025-01-16
Payer: COMMERCIAL

## 2025-01-16 ENCOUNTER — TELEPHONE (OUTPATIENT)
Dept: FAMILY MEDICINE CLINIC | Facility: CLINIC | Age: 55
End: 2025-01-16

## 2025-01-16 VITALS — SYSTOLIC BLOOD PRESSURE: 132 MMHG | DIASTOLIC BLOOD PRESSURE: 90 MMHG | HEART RATE: 89 BPM

## 2025-01-16 DIAGNOSIS — I10 HYPERTENSION, UNSPECIFIED TYPE: Primary | ICD-10-CM

## 2025-01-16 PROCEDURE — 99211 OFF/OP EST MAY X REQ PHY/QHP: CPT | Performed by: STUDENT IN AN ORGANIZED HEALTH CARE EDUCATION/TRAINING PROGRAM

## 2025-01-16 NOTE — TELEPHONE ENCOUNTER
Patient came in for nurse visit for bp check. Patient did not bring his cuff in for comparison. However my reading was 132/90 pulse: 89. Patient states since starting the losartan/HCTZ on 1/9/25 his readings at home have come down at least 20 points.

## 2025-01-21 ENCOUNTER — TELEPHONE (OUTPATIENT)
Age: 55
End: 2025-01-21

## 2025-01-21 NOTE — TELEPHONE ENCOUNTER
Patient called and scheduled an appt with Dr. Yousif 1/31 for shoulder pain     Asking in the meantime would Dr. Yousif send a script over for pain meds. The pain has increased and patient is having difficulty sleeping.    Please advise    [facility]

## 2025-01-21 NOTE — TELEPHONE ENCOUNTER
Patient notified, agreeable for [facility] physical therapy referral and referral to [facility].

## 2025-01-22 DIAGNOSIS — M25.511 ACUTE PAIN OF RIGHT SHOULDER: Primary | ICD-10-CM

## 2025-01-24 ENCOUNTER — RA CDI HCC (OUTPATIENT)
Dept: OTHER | Facility: HOSPITAL | Age: 55
End: 2025-01-24

## 2025-01-24 NOTE — PROGRESS NOTES
HCC coding opportunities          Chart Reviewed number of suggestions sent to Provider: 2  E11.22  E66.01     Patients Insurance        Commercial Insurance: [employer]

## 2025-01-27 ENCOUNTER — OFFICE VISIT (OUTPATIENT)
Dept: PODIATRY | Facility: CLINIC | Age: 55
End: 2025-01-27
Payer: COMMERCIAL

## 2025-01-27 VITALS — WEIGHT: 276 LBS | BODY MASS INDEX: 43.32 KG/M2 | HEIGHT: 67 IN

## 2025-01-27 DIAGNOSIS — L60.0 INGROWING NAIL: ICD-10-CM

## 2025-01-27 DIAGNOSIS — L60.0 INGROWN TOENAIL: Primary | ICD-10-CM

## 2025-01-27 DIAGNOSIS — M79.675 PAIN IN TOES OF BOTH FEET: ICD-10-CM

## 2025-01-27 DIAGNOSIS — M79.674 PAIN IN TOES OF BOTH FEET: ICD-10-CM

## 2025-01-27 DIAGNOSIS — L03.032 CELLULITIS OF TOE OF LEFT FOOT: ICD-10-CM

## 2025-01-27 PROCEDURE — 99203 OFFICE O/P NEW LOW 30 MIN: CPT | Performed by: PODIATRIST

## 2025-01-27 PROCEDURE — 11750 EXCISION NAIL&NAIL MATRIX: CPT | Performed by: PODIATRIST

## 2025-01-27 PROCEDURE — 11730 AVULSION NAIL PLATE SIMPLE 1: CPT | Performed by: PODIATRIST

## 2025-01-27 RX ORDER — CEPHALEXIN 500 MG/1
500 CAPSULE ORAL EVERY 6 HOURS SCHEDULED
Qty: 28 CAPSULE | Refills: 0 | Status: SHIPPED | OUTPATIENT
Start: 2025-01-27 | End: 2025-02-03

## 2025-01-27 RX ORDER — LIDOCAINE HYDROCHLORIDE 10 MG/ML
3 INJECTION, SOLUTION EPIDURAL; INFILTRATION; INTRACAUDAL; PERINEURAL ONCE
Status: COMPLETED | OUTPATIENT
Start: 2025-01-27 | End: 2025-01-27

## 2025-01-27 RX ADMIN — LIDOCAINE HYDROCHLORIDE 3 ML: 10 INJECTION, SOLUTION EPIDURAL; INFILTRATION; INTRACAUDAL; PERINEURAL at 11:09

## 2025-01-27 NOTE — PATIENT INSTRUCTIONS
Patient Education     Ingrown toenail   The Basics   Written by the doctors and editors at [facility]   What is an ingrown toenail? -- An ingrown toenail happens when the side or corner of the toenail grows into the flesh around it. It usually affects the big toe.  What are the symptoms of an ingrown toenail? -- The symptoms include pain, redness, and swelling where the nail has grown into the flesh.  Is there a test for an ingrown toenail? -- No. Your doctor or nurse should be able to tell if you have it by learning about your symptoms and doing an exam.  Is there anything I can do on my own to feel better? -- Yes. Some people feel better if they:   Place a small piece of a cotton ball or some dental floss underneath the nail to take pressure off of the toe (figure 1).   Soak the foot in warm, soapy water. Do this for 10 to 20 minutes, 2 to 3 times a day for 1 to 2 weeks. You can also use 1 to 2 teaspoons of Epsom salts (available in pharmacies) in the water instead of soap.  Should I see a doctor or nurse? -- See your doctor or nurse if redness and swelling become worse and there is pus.  How is an ingrown toenail treated? -- If the treatments you tried on your own don't help, your doctor might cut away part of your toenail. They will first inject a medicine to numb your toe. Afterward, you will need to:   Clean the area 2 to 3 times a day. Make a mixture of equal parts of water and hydrogen peroxide, and dab it on your toe with a cotton swab.   Put antibiotic ointment on your toe. Examples include bacitracin and mupirocin (brand name: Bactroban).  Can an ingrown toenail be prevented? -- You can reduce your chances of getting an ingrown toenail if you:   Wear shoes that are not too tight around your toes.   Cut your toenails straight across and not too short.  All topics are updated as new evidence becomes available and our peer review process is complete.  This topic retrieved from [facility] on: Mar 21, 2024.  Topic  32452 Version 10.0  Release: 32.2.4 - C32.79  © 2024 UpToDate, Inc. and/or its affiliates. All rights reserved.  figure 1: Ingrown toenail     (A) An ingrown toenailhappens when the side or corner of the toenail grows into the flesh around it.  (B) It might help to place a small piece of cotton underneath the nail torelieve pressure.  Graphic 984568 Version 1.0  Consumer Information Use and Disclaimer   Disclaimer: This generalized information is a limited summary of diagnosis, treatment, and/or medication information. It is not meant to be comprehensive and should be used as a tool to help the user understand and/or assess potential diagnostic and treatment options. It does NOT include all information about conditions, treatments, medications, side effects, or risks that may apply to a specific patient. It is not intended to be medical advice or a substitute for the medical advice, diagnosis, or treatment of a health care provider based on the health care provider's examination and assessment of a patient's specific and unique circumstances. Patients must speak with a health care provider for complete information about their health, medical questions, and treatment options, including any risks or benefits regarding use of medications. This information does not endorse any treatments or medications as safe, effective, or approved for treating a specific patient. UpToDate, Inc. and its affiliates disclaim any warranty or liability relating to this information or the use thereof.The use of this information is governed by the Terms of Use, available at https://www.wolterskluwer.com/en/know/clinical-effectiveness-terms. 2024© UpToDate, Inc. and its affiliates and/or licensors. All rights reserved.  Copyright   © 2024 UpToDate, Inc. and/or its affiliates. All rights reserved.

## 2025-01-27 NOTE — PROGRESS NOTES
Podiatry - Clinic Visit  Vu Mcdaniels 54 y.o. male MRN: [id number]    Assessment/Plan    No problem-specific Assessment & Plan notes found for this encounter.       Diagnoses and all orders for this visit:    Ingrown toenail  -     lidocaine (PF) (XYLOCAINE-MPF) 1 % injection 3 mL    Pain in toes of both feet    Ingrowing nail    Cellulitis of toe of left foot  -     cephalexin (KEFLEX) 500 mg capsule; Take 1 capsule (500 mg total) by mouth every 6 (six) hours for 7 days    Other orders  -     Nail removal  -     Nail removal         Plan:  - Pt seen/examined  - Please see procedure note  - Instructed pt to remove bandage tomorrow and start epsom salt soak baths  - Instructed to apply triple antibiotic ointment, and cover with a band-aid after soaks daily.  - rtc  week  - Right partial lateral permanent  - total temporary on the left.       Nail removal    Date/Time: 1/27/2025 11:00 AM    Performed by: Renan Jenkins DPM  Authorized by: Renan Jenkins DPM  Universal Protocol:  Consent: Verbal consent obtained.  Risks and benefits: risks, benefits and alternatives were discussed  Consent given by: patient  Patient understanding: patient states understanding of the procedure being performed  Patient consent: the patient's understanding of the procedure matches consent given  Patient identity confirmed: verbally with patient    Location:     Foot:  R big toe  Pre-procedure details:     Skin preparation:  Alcohol and Betadine  Anesthesia (see MAR for exact dosages):     Anesthesia method:  Local infiltration  Nail Removal:     Nail removed:  Partial    Nail side:  Lateral  Ingrown nail:     Nail matrix removed or ablated:  Partial  Post-procedure details:     Dressing:  4x4 sterile gauze, antibiotic ointment and gauze roll    Patient tolerance of procedure:  Tolerated well, no immediate complications  Nail removal    Date/Time: 1/27/2025 11:00 AM    Performed by: Renan Jenkins  YESI  Authorized by: Renan Jenkins DPM    Patient location:  ClinicUniversal Protocol:  Consent: Verbal consent obtained.  Risks and benefits: risks, benefits and alternatives were discussed  Consent given by: patient  Patient understanding: patient states understanding of the procedure being performed  Patient consent: the patient's understanding of the procedure matches consent given  Patient identity confirmed: verbally with patient    Location:     Foot:  L big toe  Pre-procedure details:     Skin preparation:  Alcohol and Betadine  Nail Removal:     Nail removed:  Complete  Ingrown nail:     Nail matrix removed or ablated:  None  Post-procedure details:     Dressing:  4x4 sterile gauze, antibiotic ointment and gauze roll    Patient tolerance of procedure:  Tolerated well, no immediate complications          History of Present Illness     HPI:  Patient presents with pain to the Laterally: bilateral toe. This has been present for on and off for months. They have experienced redness, swelling, increased pain and discharge. They have attempted wider shoes without success. They are having continued pain and drainage. They have not attempted antibiotics.    Review of Systems   Constitutional: Negative.    HENT: Negative.    Eyes: Negative.    Respiratory: Negative.    Cardiovascular: Negative.    Gastrointestinal: Negative.    Musculoskeletal: neg   Skin: ingrowing nail.   Neurological: Negative.        Historical Information   Past Medical History:   Diagnosis Date    Hypercholesterolemia     Hypertension     Prediabetes      Past Surgical History:   Procedure Laterality Date    HERNIA REPAIR Bilateral     REPAIR LABRUM Right      Social History   Social History     Substance and Sexual Activity   Alcohol Use Yes     Social History     Substance and Sexual Activity   Drug Use Never     Social History     Tobacco Use   Smoking Status Some Days    Types: Cigars    Passive exposure: Never   Smokeless  "Tobacco Never   Tobacco Comments    Smokes cigar on occasion     Family History:   Family History   Problem Relation Age of Onset    Graves' disease Mother        Meds/Allergies   Not in a hospital admission.  Allergies   Allergen Reactions    Oxycodone-Acetaminophen Syncope     feint  Pt passes out         Objective   First Vitals:   @VSFIRST2(5,8,6,7,9,11,14,10:FIRST)@    Current Vitals:   Height: 5' 7\" (170.2 cm) (01/27/25 1043)  Weight - Scale: 125 kg (276 lb) (01/27/25 1043)        Ht 5' 7\" (1.702 m)   Wt 125 kg (276 lb)   BMI 43.23 kg/m²     General Appearance:    Alert, cooperative, no distress    Constitutional: Patient is not distressed.   Patient is well developed.   Patient is  obese.   Extremities:   MMT is 5/5 to all compartments of the LE, +0/4 edema B/L, Digital ROM is intact, Pain on palpation of hallux nail.  Normal range of motion to ankle, subtalar joint, and midtarsal joint.   Normal range of motion first MTPJ.   Manual muscle testing 5 out of 5 for inversion/eversion/dorsiflexion/plantarflexion.  On stance patients feet are generally rectus.   Pulses:   R DP is +2/4, R PT is +2/4, L DP is +2/4, L PT is +2/4, CFT< 3sec to all digits. No erythema.   No edema.   No significant varicosities.   Skin:   Ingrowing nail to lateral border of right hallux nail. B/l ingrowing nail border to the left hallux. Erythema in the nail fold and some dried drainage.           Dermatology: No rash.   No open lesions.   Present pedal hair.   Skin has healthy turgor.    Neurological: Monofilament sensation is intact.   Vibratory sensation is intact.   Achilles reflex is normal.   Proprioception is normal    Respiratory: Normal respiratory effort, no distress    Psych: Patient is AAOx3. Normal mood.     Lymphatic: nonpalpable popliteal lymph nodes  Nonpalpable groin lymph nodes            "

## 2025-01-29 ENCOUNTER — EVALUATION (OUTPATIENT)
Dept: PHYSICAL THERAPY | Facility: CLINIC | Age: 55
End: 2025-01-29
Payer: COMMERCIAL

## 2025-01-29 DIAGNOSIS — M25.511 CHRONIC RIGHT SHOULDER PAIN: Primary | ICD-10-CM

## 2025-01-29 DIAGNOSIS — G89.29 CHRONIC RIGHT SHOULDER PAIN: Primary | ICD-10-CM

## 2025-01-29 PROCEDURE — 97162 PT EVAL MOD COMPLEX 30 MIN: CPT | Performed by: PHYSICAL THERAPIST

## 2025-01-29 PROCEDURE — 97140 MANUAL THERAPY 1/> REGIONS: CPT | Performed by: PHYSICAL THERAPIST

## 2025-01-29 NOTE — PROGRESS NOTES
PT Evaluation     Today's date: 2025  Patient name: Vu Mcdaniels  : 1970  MRN: [id number]  Referring provider: Markos Yousif MD  Dx:   Encounter Diagnosis     ICD-10-CM    1. Chronic right shoulder pain  M25.511     G89.29                      Assessment  Impairments: abnormal coordination, abnormal muscle firing, abnormal or restricted ROM, activity intolerance, impaired physical strength, lacks appropriate home exercise program, pain with function and safety issue  Functional limitations: Difficulty with lifting at and above shoulder height.  Symptom irritability: moderate    Assessment details: Vu Mcdaniels is a 54 y.o. male who presents to outpatient PT with a  Chronic right shoulder pain  (primary encounter diagnosis).  No further referral appears necessary at this time based upon examination results. Exam findings consistent with rotator  cuff/ labral pathology. Pt presents with decreased strength, ROM, balance, functional activity tolerance, and pain with movement in his right shoulder  which is  limiting his ability to perform the aforementioned functional activities.  Etiologic factors include repetitive poor body mechanics. Prognosis is good given HEP compliance and PT 2-3/wk.  Please contact me if you have any questions or recommendations.  Thank you for the opportunity to share in  Vu wise.     Understanding of Dx/Px/POC: good     Prognosis: good    Goals  STG to be achieved in 4 weeks     1. Pt will reduce subjective pain rating by at least 50 percent the help facilitate return to PLOF  2. Pt will improve right shoulder PROM/ AROMby at least 10 degrees to help promote improved functional activity tolerance   3. Pt will improve right shoulder MMT scores by at least 1/2 grade to promote improved functional activity tolerance        LTG to be achieved in 6-8 weeks  1. Pt will be complaint with HEP   2. Pt will improve Right shoulder AROM to WFL, to help facilitate independence  with ADL's, IADL's, and functional activities   3. Pt will improve right shoulder Strength to WFL to help facilitate independence with ADL's, IADL's, and functional activities   4. Pt will have no limitations with ambulation to help facilitate independence at home and in the community.   5. Pt will have no limitations with stair negotiation to help facilitate independence at home and in the community           Plan  Patient would benefit from: skilled physical therapy    Planned therapy interventions: ADL training, balance, balance/weight bearing training, flexibility, functional ROM exercises, gait training, graded activity, graded exercise, graded motor, home exercise program, joint mobilization, manual therapy, neuromuscular re-education, patient education, postural training, strengthening, stretching, therapeutic activities and therapeutic exercise    Frequency: 2x week  Duration in weeks: 12  Plan of Care beginning date: 2025  Plan of Care expiration date: 2025  Treatment plan discussed with: patient, PTA and referring physician        Subjective Evaluation    History of Present Illness  Mechanism of injury: 54 year old male who presents to outpatient PT with CC of right shoulder pain since . The patient reports he slipped and fell outside on soraya night, He fell on his left side, but reports increased pain in his right shoulder today. He reports localized pain to the lateral aspect of the right shoulder, reports pain in the shoulder feels like a pulling deep pain. Denies radicular sx. Pain is exacerbated with reaching into Hz adduction. He does report occasional clicking/ popping in the shoulder. Goals: to gain more function in the shoulder.   Patient Goals  Patient goals for therapy: increased strength, decreased pain and increased motion  Patient goal: to gain more motion in the shoulder  Pain  Current pain ratin  At best pain ratin  At worst pain ratin  Quality:  pressure  Aggravating factors: lifting  Progression: worsening        Objective     Active Range of Motion     Right Shoulder   Flexion: 140 degrees   Abduction: 90 degrees   External rotation BTH: Active external rotation behind the head: occiput.   Internal rotation BTB: Active internal rotation behind the back: Sacrum.     Strength/Myotome Testing     Right Shoulder     Planes of Motion   Flexion: 3-   Abduction: 2   External rotation at 0°: 2   Internal rotation at 0°: 2     Tests     Right Shoulder   Positive active compression (Flanagan) and empty can.                POC Expires Reeval for Medicare to be completed Unit LImit Auth Expiration Date PT/OT/STVisit Limit   4/29 By visit  10       Completed on                  VISIT TRACKER  DATE 1/29            Visit # 1            Remaining 9                   Precautions: HTN       Manuals 1/29            PROM to right shoulder  RR                                                   Neuro Re-Ed             MTP LTP             TB ER IR              B/L TB ER              Supine Cane              Compass             Supine ABC              UBE for postural correction                                       Ther Ex             Pulley              Finger ladder              Upper trap stretch              Levator stretch                                                                  Ther Activity                                       Gait Training                                       Modalities

## 2025-01-30 ENCOUNTER — OFFICE VISIT (OUTPATIENT)
Dept: OBGYN CLINIC | Facility: CLINIC | Age: 55
End: 2025-01-30
Payer: COMMERCIAL

## 2025-01-30 VITALS
TEMPERATURE: 97.8 F | OXYGEN SATURATION: 98 % | HEART RATE: 87 BPM | WEIGHT: 265.2 LBS | HEIGHT: 67 IN | BODY MASS INDEX: 41.62 KG/M2

## 2025-01-30 DIAGNOSIS — M67.441 GANGLION CYST OF FINGER OF RIGHT HAND: ICD-10-CM

## 2025-01-30 DIAGNOSIS — R29.898 WEAKNESS OF RIGHT UPPER EXTREMITY: ICD-10-CM

## 2025-01-30 DIAGNOSIS — S49.91XA SHOULDER INJURY, RIGHT, INITIAL ENCOUNTER: Primary | ICD-10-CM

## 2025-01-30 DIAGNOSIS — M25.511 ACUTE PAIN OF RIGHT SHOULDER: ICD-10-CM

## 2025-01-30 PROCEDURE — 99204 OFFICE O/P NEW MOD 45 MIN: CPT | Performed by: FAMILY MEDICINE

## 2025-01-30 NOTE — PATIENT INSTRUCTIONS
F/u after MR  MR arthrogram-  R shoulder pain/injury/fall/weakness/XR neg- r/o labral /rotator cuff tear.   Icing/heat/OTC pain meds as needed.  Referral to ortho hand

## 2025-01-30 NOTE — LETTER
January 30, 2025     Markos Yousif MD  [address] [facility] [place] [address]    Patient: Vu Mcdaniels   YOB: 1970   Date of Visit: 1/30/2025       Dear Dr. Yousif:    Thank you for referring Vu Mcdaniels to me for evaluation. Below are my notes for this consultation.    If you have questions, please do not hesitate to call me. I look forward to following your patient along with you.         Sincerely,        Buddy Dillard MD        CC: No Recipients    Buddy Dillard MD  1/30/2025 11:48 AM  Incomplete  [facility] [address] [id number] [phone]      Assessment:  1. Acute pain of right shoulder  -     Ambulatory Referral to Orthopedic Surgery      Plan:  There are no Patient Instructions on file for this visit.   No follow-ups on file.    Chief Complaint:  Chief Complaint   Patient presents with   • Right Shoulder - Pain       Subjective:   HPI    Patient ID: Vu Mcdaniels is a 54 y.o. male     Here c/o R shoulder pain  Pain for about 2 months  12/24 fell down steps/slipped and R hand on bannister and held on.  Pain getting worse  Pain with movement- reaching out  Ibuprofen PRN- not much help  Heating pad helps  Going to PT  12- 15 yrs ago injured in training exercise- SLAP tear, repaired.  Hx of DM A1c 6.5, 12/24      RIGHT SHOULDER     INDICATION:   Pain in right shoulder.      COMPARISON:  None.     VIEWS:  XR SHOULDER 2+ VW RIGHT     FINDINGS:     There is no acute fracture or dislocation.     No significant degenerative changes.     No lytic or blastic osseous lesion.     Soft tissues are unremarkable.     IMPRESSION:        No acute osseous abnormality.     Electronically signed: 01/09/2025 01:39 PM Niranjan Bhandari MD    Review of Systems   Constitutional:  Negative for fatigue and fever.   Respiratory:  Negative for shortness of breath.    Cardiovascular:  Negative for chest pain.   Gastrointestinal:   "Negative for abdominal pain and nausea.   Genitourinary:  Negative for dysuria.   Musculoskeletal:  Positive for arthralgias.   Skin:  Negative for rash and wound.   Neurological:  Negative for weakness and headaches.       Objective:  Vitals:  Pulse 87   Temp 97.8 °F (36.6 °C) (Tympanic)   Ht 5' 7\" (1.702 m)   Wt 120 kg (265 lb 3.2 oz)   SpO2 98%   BMI 41.54 kg/m²     The following portions of the patient's history were reviewed and updated as appropriate: allergies, current medications, past family history, past medical history, past social history, past surgical history, and problem list.    Physical exam:  Physical Exam  Constitutional:       Appearance: Normal appearance. He is normal weight.   Eyes:      Extraocular Movements: Extraocular movements intact.   Pulmonary:      Effort: Pulmonary effort is normal.   Musculoskeletal:      Cervical back: Normal range of motion.   Skin:     General: Skin is warm and dry.   Neurological:      General: No focal deficit present.      Mental Status: He is alert and oriented to person, place, and time. Mental status is at baseline.   Psychiatric:         Mood and Affect: Mood normal.         Behavior: Behavior normal.         Thought Content: Thought content normal.         Judgment: Judgment normal.       Right Shoulder Exam     Tenderness   The patient is experiencing no tenderness.    Range of Motion   Active abduction:  120 abnormal   Passive abduction:  abnormal   Extension:  normal   External rotation:  normal   Forward flexion:  abnormal   Internal rotation 0 degrees:  normal   Internal rotation 90 degrees:  normal     Muscle Strength   Abduction: 3/5   Internal rotation: 4/5   External rotation: 2/5   Supraspinatus: 3/5   Subscapularis: 2/5   Biceps: 4/5     Tests   Valdes test: positive            I have personally reviewed pertinent films in PACS and my interpretation is XR R shoulder- nml study.      Buddy Dillard MD    "

## 2025-01-30 NOTE — PROGRESS NOTES
[facility] [address] [place]  [facility] [place] [phone]      Assessment:  1. Shoulder injury, right, initial encounter  -     FL injection right shoulder (arthrogram); Future; Expected date: 01/30/2025  -     MRI arthrogram right shoulder; Future; Expected date: 01/30/2025  2. Acute pain of right shoulder  -     Ambulatory Referral to Orthopedic Surgery  -     FL injection right shoulder (arthrogram); Future; Expected date: 01/30/2025  -     MRI arthrogram right shoulder; Future; Expected date: 01/30/2025  3. Ganglion cyst of finger of right hand  -     MRI arthrogram right shoulder; Future; Expected date: 01/30/2025  -     Ambulatory Referral to Orthopedic Surgery; Future  4. Weakness of right upper extremity  -     FL injection right shoulder (arthrogram); Future; Expected date: 01/30/2025  -     MRI arthrogram right shoulder; Future; Expected date: 01/30/2025      Plan:  Patient Instructions   F/u after MR  MR arthrogram-  R shoulder pain/injury/fall/weakness/XR neg- r/o labral /rotator cuff tear.   Icing/heat/OTC pain meds as needed.  Referral to ortho hand   Return for f/u after MR arthrogram, Recheck.    Chief Complaint:  Chief Complaint   Patient presents with    Right Shoulder - Pain       Subjective:   HPI    Patient ID: Vu Mcdaniels is a 54 y.o. male     Here c/o R shoulder pain  Pain for about 2 months  12/24 fell down steps/slipped and R hand on bannister and held on.  Pain getting worse  Pain with movement- reaching out  Ibuprofen PRN- not much help  Heating pad helps  Going to PT  12- 15 yrs ago injured in training exercise- SLAP tear, repaired.  Hx of DM A1c 6.5, 12/24      RIGHT SHOULDER     INDICATION:   Pain in right shoulder.      COMPARISON:  None.     VIEWS:  XR SHOULDER 2+ VW RIGHT     FINDINGS:     There is no acute fracture or dislocation.     No significant degenerative changes.     No lytic or blastic osseous lesion.    "  Soft tissues are unremarkable.     IMPRESSION:        No acute osseous abnormality.     Electronically signed: 01/09/2025 01:39 PM Niranjan Bhandari MD    Review of Systems   Constitutional:  Negative for fatigue and fever.   Respiratory:  Negative for shortness of breath.    Cardiovascular:  Negative for chest pain.   Gastrointestinal:  Negative for abdominal pain and nausea.   Genitourinary:  Negative for dysuria.   Musculoskeletal:  Positive for arthralgias.   Skin:  Negative for rash and wound.   Neurological:  Negative for weakness and headaches.       Objective:  Vitals:  Pulse 87   Temp 97.8 °F (36.6 °C) (Tympanic)   Ht 5' 7\" (1.702 m)   Wt 120 kg (265 lb 3.2 oz)   SpO2 98%   BMI 41.54 kg/m²     The following portions of the patient's history were reviewed and updated as appropriate: allergies, current medications, past family history, past medical history, past social history, past surgical history, and problem list.    Physical exam:  Physical Exam  Constitutional:       Appearance: Normal appearance. He is normal weight.   Eyes:      Extraocular Movements: Extraocular movements intact.   Pulmonary:      Effort: Pulmonary effort is normal.   Musculoskeletal:      Cervical back: Normal range of motion.   Skin:     General: Skin is warm and dry.   Neurological:      General: No focal deficit present.      Mental Status: He is alert and oriented to person, place, and time. Mental status is at baseline.   Psychiatric:         Mood and Affect: Mood normal.         Behavior: Behavior normal.         Thought Content: Thought content normal.         Judgment: Judgment normal.       Right Shoulder Exam     Tenderness   The patient is experiencing no tenderness.    Range of Motion   Active abduction:  120 abnormal   Passive abduction:  abnormal   Extension:  normal   External rotation:  normal   Forward flexion:  abnormal   Internal rotation 0 degrees:  normal   Internal rotation 90 degrees:  normal     Muscle " Strength   Abduction: 3/5   Internal rotation: 4/5   External rotation: 2/5   Supraspinatus: 3/5   Subscapularis: 2/5   Biceps: 4/5     Tests   Valdes test: positive            I have personally reviewed pertinent films in PACS and my interpretation is XR R shoulder- nml study.      Buddy Dillard MD

## 2025-02-03 ENCOUNTER — OFFICE VISIT (OUTPATIENT)
Dept: PHYSICAL THERAPY | Facility: CLINIC | Age: 55
End: 2025-02-03
Payer: COMMERCIAL

## 2025-02-03 DIAGNOSIS — G89.29 CHRONIC RIGHT SHOULDER PAIN: Primary | ICD-10-CM

## 2025-02-03 DIAGNOSIS — M25.511 CHRONIC RIGHT SHOULDER PAIN: Primary | ICD-10-CM

## 2025-02-03 PROCEDURE — 97140 MANUAL THERAPY 1/> REGIONS: CPT | Performed by: PHYSICAL THERAPIST

## 2025-02-03 PROCEDURE — 97110 THERAPEUTIC EXERCISES: CPT | Performed by: PHYSICAL THERAPIST

## 2025-02-03 PROCEDURE — 97112 NEUROMUSCULAR REEDUCATION: CPT | Performed by: PHYSICAL THERAPIST

## 2025-02-03 NOTE — PROGRESS NOTES
Daily Note     Today's date: 2/3/2025  Patient name: Vu Mcdaniels  : 1970  MRN: [id number]  Referring provider: Markos Yousif MD  Dx:   Encounter Diagnosis     ICD-10-CM    1. Chronic right shoulder pain  M25.511     G89.29                      Subjective: Pt offers no new complaints. He is scheduled for MRI on right shoulder on 3/17      Objective: See treatment diary below      Assessment: Tolerated treatment well. Patient exhibited good technique with therapeutic exercises and would benefit from continued PT. Toelrated initial treatment well       Plan: Continue per plan of care.        POC Expires Reeval for Medicare to be completed Unit LImit Auth Expiration Date PT/OT/STVisit Limit    By visit  10       Completed on                  VISIT TRACKER  DATE 1/29 2/3           Visit # 1 2           Remaining 9 8                  Precautions: HTN       Manuals 1/29 2/3           PROM to right shoulder  RR RR                                                  Neuro Re-Ed             MTP LTP  Grn 5''20x           TB ER IR   Green 5''20x           B/L TB ER              Supine Cane              Compass             Supine ABC              UBE for postural correction   120                                      Ther Ex             Pulley   5''20x flexion scaption            Finger ladder   5''10x flex scap            Upper trap stretch              Levator stretch                                                                  Ther Activity                                       Gait Training                                       Modalities

## 2025-02-05 DIAGNOSIS — I10 HYPERTENSION, UNSPECIFIED TYPE: ICD-10-CM

## 2025-02-06 RX ORDER — LOSARTAN POTASSIUM AND HYDROCHLOROTHIAZIDE 25; 100 MG/1; MG/1
1 TABLET ORAL DAILY
Qty: 30 TABLET | Refills: 5 | Status: SHIPPED | OUTPATIENT
Start: 2025-02-06

## 2025-02-07 ENCOUNTER — OFFICE VISIT (OUTPATIENT)
Dept: PHYSICAL THERAPY | Facility: CLINIC | Age: 55
End: 2025-02-07
Payer: COMMERCIAL

## 2025-02-07 DIAGNOSIS — G89.29 CHRONIC RIGHT SHOULDER PAIN: Primary | ICD-10-CM

## 2025-02-07 DIAGNOSIS — M25.511 CHRONIC RIGHT SHOULDER PAIN: Primary | ICD-10-CM

## 2025-02-07 PROCEDURE — 97140 MANUAL THERAPY 1/> REGIONS: CPT

## 2025-02-07 PROCEDURE — 97110 THERAPEUTIC EXERCISES: CPT

## 2025-02-07 PROCEDURE — 97112 NEUROMUSCULAR REEDUCATION: CPT

## 2025-02-07 NOTE — PROGRESS NOTES
"Daily Note     Today's date: 2025  Patient name: Vu Mcdaniels  : 1970  MRN: [id number]  Referring provider: Markos Yousif MD  Dx:   Encounter Diagnosis     ICD-10-CM    1. Chronic right shoulder pain  M25.511     G89.29                      Subjective: Pt reports that he is feeling about the same as last session with continued pain in his R shoulder. He is scheduled for MRI on right shoulder on 3/17      Objective: See treatment diary below      Assessment: Tolerated treatment well. A tight end feel was present in all shoulder directions with manuals today. He was mostly challenged with resisted shoulder ER.  Patient exhibited good technique with therapeutic exercises and would benefit from continued PT. Toelrated initial treatment well       Plan: Continue per plan of care.        POC Expires Reeval for Medicare to be completed Unit LImit Auth Expiration Date PT/OT/STVisit Limit    By visit  10       Completed on                  VISIT TRACKER  DATE 1/29 2/3 2/7          Visit # 1 2 3          Remaining 9 8 7                 Precautions: HTN       Manuals 1/29 2/3 2/7          PROM to right shoulder  RR RR MM                                                 Neuro Re-Ed             MTP LTP  Grn 5''20x Grn 5\"x20          TB ER IR   Green 5''20x Green 5\"x20          B/L TB ER              Supine Cane    Flex 10\"x10          Compass             Supine ABC              UBE for postural correction   120   120                                    Ther Ex             Pulley   5''20x flexion scaption  5''20x flexion scaption           Finger ladder   5''10x flex scap  5''10x flex scap           Upper trap stretch              Levator stretch                                                                  Ther Activity                                       Gait Training                                       Modalities                                            "

## 2025-02-10 ENCOUNTER — OFFICE VISIT (OUTPATIENT)
Dept: PHYSICAL THERAPY | Facility: CLINIC | Age: 55
End: 2025-02-10
Payer: COMMERCIAL

## 2025-02-10 DIAGNOSIS — G89.29 CHRONIC RIGHT SHOULDER PAIN: Primary | ICD-10-CM

## 2025-02-10 DIAGNOSIS — M25.511 CHRONIC RIGHT SHOULDER PAIN: Primary | ICD-10-CM

## 2025-02-10 PROCEDURE — 97112 NEUROMUSCULAR REEDUCATION: CPT

## 2025-02-10 PROCEDURE — 97110 THERAPEUTIC EXERCISES: CPT

## 2025-02-10 PROCEDURE — 97140 MANUAL THERAPY 1/> REGIONS: CPT

## 2025-02-10 NOTE — PROGRESS NOTES
"Daily Note     Today's date: 2/10/2025  Patient name: Vu Mcdaniels  : 1970  MRN: [id number]  Referring provider: Markos Yousif MD  Dx:   Encounter Diagnosis     ICD-10-CM    1. Chronic right shoulder pain  M25.511     G89.29                      Subjective: Pt reports his shoulder still sore. Awaiting MRI 3/17.       Objective: See treatment diary below      Assessment: Tolerated treatment well. Pt c/o discomfort and feeling of locking with R shoulder flexion PROM today. Patient demonstrated fatigue post treatment, exhibited good technique with therapeutic exercises, and would benefit from continued PT      Plan: Continue per plan of care.        POC Expires Reeval for Medicare to be completed Unit LImit Auth Expiration Date PT/OT/STVisit Limit    By visit  10       Completed on                  VISIT TRACKER  DATE 1/29 2/3 2/7 2/10         Visit # 1 2 3 4         Remaining 9 8 7 6                Precautions: HTN       Manuals 1/29 2/3 2/7 2/10         PROM to right shoulder  RR RR MM MJC                                                Neuro Re-Ed             MTP LTP  Grn 5''20x Grn 5\"x20 GTB  5\" x20 ea         TB ER IR   Green 5''20x Green 5\"x20 GTB  5\" x20         B/L TB ER              Supine Cane    Flex 10\"x10 5\" x10         Compass             Supine ABC              UBE for postural correction  / 120  6/6 120 6'/6'  120 rpm                                   Ther Ex             Pulley   5''20x flexion scaption  5''20x flexion scaption  5\" x20 flex/  scap         Finger ladder   5''10x flex scap  5''10x flex scap  5\" x10 flex/scap         Upper trap stretch              Levator stretch                                                                  Ther Activity                                       Gait Training                                       Modalities                                              "

## 2025-02-13 ENCOUNTER — OFFICE VISIT (OUTPATIENT)
Dept: PHYSICAL THERAPY | Facility: CLINIC | Age: 55
End: 2025-02-13
Payer: COMMERCIAL

## 2025-02-13 DIAGNOSIS — G89.29 CHRONIC RIGHT SHOULDER PAIN: Primary | ICD-10-CM

## 2025-02-13 DIAGNOSIS — M25.511 CHRONIC RIGHT SHOULDER PAIN: Primary | ICD-10-CM

## 2025-02-13 PROCEDURE — 97140 MANUAL THERAPY 1/> REGIONS: CPT | Performed by: PHYSICAL THERAPIST

## 2025-02-13 PROCEDURE — 97112 NEUROMUSCULAR REEDUCATION: CPT | Performed by: PHYSICAL THERAPIST

## 2025-02-13 NOTE — PROGRESS NOTES
"Daily Note     Today's date: 2025  Patient name: Vu Mcdaniels  : 1970  MRN: [id number]  Referring provider: Markos Yousif MD  Dx:   Encounter Diagnosis     ICD-10-CM    1. Chronic right shoulder pain  M25.511     G89.29                      Subjective: Pt is doing well he offers no new complaints today       Objective: See treatment diary below      Assessment: Tolerated treatment well. Patient exhibited good technique with therapeutic exercises and would benefit from continued PT      Plan: Continue per plan of care.        POC Expires Reeval for Medicare to be completed Unit LImit Auth Expiration Date PT/OT/STVisit Limit    By visit  10       Completed on                  VISIT TRACKER  DATE 1/29 2/3 2/7 2/10 2/12        Visit # 1 2 3 4 3        Remaining 9 8 7 6 7               Precautions: HTN       Manuals 1/29 2/3 2/7 2/10 2/12        PROM to right shoulder  RR RR MM MJC RR                                               Neuro Re-Ed             MTP LTP  Grn 5''20x Grn 5\"x20 GTB  5\" x20 ea GTB  5\" x20 ea        TB ER IR   Green 5''20x Green 5\"x20 GTB  5\" x20 GTB 5''20x        B/L TB ER              Supine Cane    Flex 10\"x10 5\" x10 5''10x        Compass             Supine ABC              UBE for postural correction   120  6/6 120 6'/6'  120 rpm / 120                                  Ther Ex             Pulley   5''20x flexion scaption  5''20x flexion scaption  5\" x20 flex/  scap 5\" x20 flex/  scap        Finger ladder   5''10x flex scap  5''10x flex scap  5\" x10 flex/scap 5\" x10 flex/scap        Upper trap stretch              Levator stretch                                                                  Ther Activity                                       Gait Training                                       Modalities                                                "

## 2025-02-17 ENCOUNTER — OFFICE VISIT (OUTPATIENT)
Dept: PHYSICAL THERAPY | Facility: CLINIC | Age: 55
End: 2025-02-17
Payer: COMMERCIAL

## 2025-02-17 DIAGNOSIS — G89.29 CHRONIC RIGHT SHOULDER PAIN: Primary | ICD-10-CM

## 2025-02-17 DIAGNOSIS — M25.511 CHRONIC RIGHT SHOULDER PAIN: Primary | ICD-10-CM

## 2025-02-17 PROCEDURE — 97140 MANUAL THERAPY 1/> REGIONS: CPT

## 2025-02-17 PROCEDURE — 97110 THERAPEUTIC EXERCISES: CPT

## 2025-02-17 PROCEDURE — 97112 NEUROMUSCULAR REEDUCATION: CPT

## 2025-02-17 NOTE — PROGRESS NOTES
"Daily Note     Today's date: 2025  Patient name: Vu Mcdaniels  : 1970  MRN: [id number]  Referring provider: Markos Yousif MD  Dx:   Encounter Diagnosis     ICD-10-CM    1. Chronic right shoulder pain  M25.511     G89.29                      Subjective: Pt reports R shoulder is sore from shoveling.       Objective: See treatment diary below      Assessment: Tolerated treatment well. Did not progress with exercise today secondary to c/o increased soreness upon arrival to PT today. Pt c/o increased R shoulder soreness with tband resisted ER today. Performed all exercise within pain free ranges and adjusted resistnace as needed. Encouraged patient to avoid painful active R shoulder motion at home.  Patient demonstrated fatigue post treatment, exhibited good technique with therapeutic exercises, and would benefit from continued PT      Plan: Continue per plan of care.        POC Expires Reeval for Medicare to be completed Unit LImit Auth Expiration Date PT/OT/STVisit Limit    By visit  10       Completed on                  VISIT TRACKER  DATE 1/29 2/3 2/7 2/10 2/12 2/17       Visit # 1 2 3 4 5 6       Remaining 9 8 7 6 5 4              Precautions: HTN       Manuals 1/29 2/3 2/7 2/10 2/12 2/17       PROM to right shoulder  RR RR MM MJC RR MJC                                              Neuro Re-Ed             MTP LTP  Grn 5''20x Grn 5\"x20 GTB  5\" x20 ea GTB  5\" x20 ea GTB  5\" x20       TB ER IR   Green 5''20x Green 5\"x20 GTB  5\" x20 GTB 5''20x GTB  X20 IR;   ER GTB  x10   No resistnace x10       B/L TB ER              Supine Cane    Flex 10\"x10 5\" x10 5''10x 5\" x10       Compass             Supine ABC              UBE for postural correction   120  6/6 120 6'/6'  120 rpm 6/6 120 6'/6'  120 rpm                                 Ther Ex             Pulley   5''20x flexion scaption  5''20x flexion scaption  5\" x20 flex/  scap 5\" x20 flex/  scap 5\"x20    Flex/  scap       Finger ladder   5''10x " "flex scap  5''10x flex scap  5\" x10 flex/scap 5\" x10 flex/scap 5\" x10  Flex/ scap       Upper trap stretch              Levator stretch                                                                  Ther Activity                                       Gait Training                                       Modalities             CP       X10 min                               "

## 2025-02-19 ENCOUNTER — OFFICE VISIT (OUTPATIENT)
Age: 55
End: 2025-02-19
Payer: COMMERCIAL

## 2025-02-19 VITALS — BODY MASS INDEX: 41.59 KG/M2 | HEIGHT: 67 IN | WEIGHT: 265 LBS

## 2025-02-19 DIAGNOSIS — L60.0 INGROWING NAIL: ICD-10-CM

## 2025-02-19 DIAGNOSIS — M79.674 PAIN IN TOES OF BOTH FEET: ICD-10-CM

## 2025-02-19 DIAGNOSIS — L03.032 CELLULITIS OF TOE OF LEFT FOOT: ICD-10-CM

## 2025-02-19 DIAGNOSIS — M79.675 PAIN IN TOES OF BOTH FEET: ICD-10-CM

## 2025-02-19 DIAGNOSIS — L60.0 INGROWN TOENAIL: Primary | ICD-10-CM

## 2025-02-19 PROCEDURE — 99213 OFFICE O/P EST LOW 20 MIN: CPT | Performed by: PODIATRIST

## 2025-02-19 NOTE — PROGRESS NOTES
Podiatry - Clinic Visit  Vu Mcdaniels 54 y.o. male MRN: [id number]    Assessment/Plan    No problem-specific Assessment & Plan notes found for this encounter.       Diagnoses and all orders for this visit:    Ingrown toenail    Cellulitis of toe of left foot    Pain in toes of both feet    Ingrowing nail         Plan:  - Pt seen/examined, now 3 weeks s/p IGTN removal  - signs infection are now resolved, minimal to no pain on examination.    -He is slightly delayed, he is having a phenol reaction on the right, but minimal drainage, and the left hallux toenail is slowly healing in  - He may return to clinic as needed, continue soaking as needed for his symptoms  - patient is to return to clinic p.r.n.  - cellulitis is resolved.   History of Present Illness     HPI:  HPI:  Patient presents with pain to the Laterally: bilateral toe.  Had an ingrown toenail removal approximately 2 weeks ago, and they have been complying with postoperative instructions such as soaking antibiotic ointment and Band-Aid.  They note improved pain, and nearly resolved drainage.  They have no other complaints      Review of Systems   Constitutional: Negative.    HENT: Negative.    Eyes: Negative.    Respiratory: Negative.    Cardiovascular: Negative.    Gastrointestinal: Negative.    Musculoskeletal: Negative   Skin: ingrowing nail sp removal  Neurological: Negative.        Historical Information   Past Medical History:   Diagnosis Date    Hypercholesterolemia     Hypertension     Prediabetes      Past Surgical History:   Procedure Laterality Date    HERNIA REPAIR Bilateral     REPAIR LABRUM Right      Social History   Social History     Substance and Sexual Activity   Alcohol Use Yes     Social History     Substance and Sexual Activity   Drug Use Never     Social History     Tobacco Use   Smoking Status Some Days    Types: Cigars    Passive exposure: Never   Smokeless Tobacco Never   Tobacco Comments    Smokes cigar on occasion  "    Family History:   Family History   Problem Relation Age of Onset    Graves' disease Mother        Meds/Allergies   Not in a hospital admission.  Allergies   Allergen Reactions    Oxycodone-Acetaminophen Syncope     feint  Pt passes out         Objective   First Vitals:   @VSFIRST2(5,8,6,7,9,11,14,10:FIRST)@    Current Vitals:   Height: 5' 7\" (170.2 cm) (02/19/25 1105)  Weight - Scale: 120 kg (265 lb) (02/19/25 1105)        Ht 5' 7\" (1.702 m)   Wt 120 kg (265 lb)   BMI 41.50 kg/m²     General Appearance:    Alert, cooperative, no distress    Constitutional: Patient is not distressed.   Patient is well developed.   Patient is  obese.   Extremities:   MMT is 5/5 to all compartments of the LE, +0/4 edema B/L, Digital ROM is intact  Normal range of motion to ankle, subtalar joint, and midtarsal joint.   Normal range of motion first MTPJ.   Manual muscle testing 5 out of 5 for inversion/eversion/dorsiflexion/plantarflexion.  On stance patients feet are generally rectus.   Pulses:   R DP is +1/4, R PT is +1/4, L DP is +1/4, L PT is +1/4, CFT< 3sec to all digits. No erythema.   No edema.   No significant varicosities.   Skin:   Resolved Ingrowing nail to lateral border right hallux, slight erythema blanchable seems to be a phenol reaction, left hallux is also somewhat slow healing continue drainage but overall looking good.         Dermatology: No rash.   No open lesions.   Present pedal hair.   Skin has healthy turgor.    Neurological: Monofilament sensation is intact.   Vibratory sensation is intact.   Achilles reflex is normal.   Proprioception is normal    Respiratory: Normal respiratory effort, no distress    Psych: Patient is AAOx3. Normal mood.     Lymphatic: nonpalpable popliteal lymph nodes  Nonpalpable groin lymph nodes            "

## 2025-02-20 ENCOUNTER — OFFICE VISIT (OUTPATIENT)
Dept: PHYSICAL THERAPY | Facility: CLINIC | Age: 55
End: 2025-02-20
Payer: COMMERCIAL

## 2025-02-20 DIAGNOSIS — M25.511 CHRONIC RIGHT SHOULDER PAIN: Primary | ICD-10-CM

## 2025-02-20 DIAGNOSIS — G89.29 CHRONIC RIGHT SHOULDER PAIN: Primary | ICD-10-CM

## 2025-02-20 PROCEDURE — 97140 MANUAL THERAPY 1/> REGIONS: CPT | Performed by: PHYSICAL THERAPIST

## 2025-02-20 PROCEDURE — 97110 THERAPEUTIC EXERCISES: CPT | Performed by: PHYSICAL THERAPIST

## 2025-02-20 PROCEDURE — 97112 NEUROMUSCULAR REEDUCATION: CPT | Performed by: PHYSICAL THERAPIST

## 2025-02-20 NOTE — PROGRESS NOTES
"Daily Note     Today's date: 2025  Patient name: Vu Mcdaniels  : 1970  MRN: [id number]  Referring provider: Markos Yousif MD  Dx:   Encounter Diagnosis     ICD-10-CM    1. Chronic right shoulder pain  M25.511     G89.29                      Subjective: Pt reports no significant changes in the right shoulder       Objective: See treatment diary below      Assessment: Tolerated treatment well. Patient exhibited good technique with therapeutic exercises, would benefit from continued PT, and would benefit from continued OT      Plan: Continue per plan of care.        POC Expires Reeval for Medicare to be completed Unit LImit Auth Expiration Date PT/OT/STVisit Limit    By visit  10       Completed on                  VISIT TRACKER  DATE 1/29 2/3 2/7 2/10 2/12 2/17 2/20      Visit # 1 2 3 4 5 6 7      Remaining 9 8 7 6 5 4 3             Precautions: HTN       Manuals 1/29 2/3 2/7 2/10 2/12 2/17 2/20      PROM to right shoulder  RR RR MM MJC RR MJC RR                                             Neuro Re-Ed             MTP LTP  Grn 5''20x Grn 5\"x20 GTB  5\" x20 ea GTB  5\" x20 ea GTB  5\" x20 GTB  5\" x20      TB ER IR   Green 5''20x Green 5\"x20 GTB  5\" x20 GTB 5''20x GTB  X20 IR;   ER GTB  x10   No resistnace x10 GTB  X20 IR;   ER GTB  x10   No resistnace x10      B/L TB ER              Supine Cane    Flex 10\"x10 5\" x10 5''10x 5\" x10 5\" x10      Compass             Supine ABC              UBE for postural correction   120  6/6 120 6'/6'  120 rpm /6 120 6'/6'  120 rpm 6'/6'  120 rpm                                Ther Ex             Pulley   5''20x flexion scaption  5''20x flexion scaption  5\" x20 flex/  scap 5\" x20 flex/  scap 5\"x20    Flex/  scap 5\"x20    Flex/  scap      Finger ladder   5''10x flex scap  5''10x flex scap  5\" x10 flex/scap 5\" x10 flex/scap 5\" x10  Flex/ scap 5\" x10  Flex/ scap      Upper trap stretch              Levator stretch                                                       "            Ther Activity                                       Gait Training                                       Modalities             CP       X10 min

## 2025-02-24 ENCOUNTER — OFFICE VISIT (OUTPATIENT)
Dept: PHYSICAL THERAPY | Facility: CLINIC | Age: 55
End: 2025-02-24
Payer: COMMERCIAL

## 2025-02-24 DIAGNOSIS — M25.511 CHRONIC RIGHT SHOULDER PAIN: Primary | ICD-10-CM

## 2025-02-24 DIAGNOSIS — G89.29 CHRONIC RIGHT SHOULDER PAIN: Primary | ICD-10-CM

## 2025-02-24 PROCEDURE — 97112 NEUROMUSCULAR REEDUCATION: CPT | Performed by: PHYSICAL THERAPIST

## 2025-02-24 PROCEDURE — 97140 MANUAL THERAPY 1/> REGIONS: CPT | Performed by: PHYSICAL THERAPIST

## 2025-02-24 NOTE — PROGRESS NOTES
"Daily Note     Today's date: 2025  Patient name: Vu Mcdaniels  : 1970  MRN: [id number]  Referring provider: Markos Yousif MD  Dx:   Encounter Diagnosis     ICD-10-CM    1. Chronic right shoulder pain  M25.511     G89.29                      Subjective: Pt reports no significant changes in the right shoulder  MRI on 3/17      Objective: See treatment diary below      Assessment: Tolerated treatment fair. Patient exhibited good technique with therapeutic exercises and would benefit from continued PT      Plan: Continue per plan of care.        POC Expires Reeval for Medicare to be completed Unit LImit Auth Expiration Date PT/OT/STVisit Limit    By visit  10       Completed on                  VISIT TRACKER  DATE 1/29 2/3 2/7 2/10 2/12 2/17 2/20 2/24     Visit # 1 2 3 4 5 6 7 8     Remaining 9 8 7 6 5 4 3 2            Precautions: HTN       Manuals 1/29 2/3 2/7 2/10 2/12 2/17 2/20 2/24     PROM to right shoulder  RR RR MM MJC RR MJC RR RR                                            Neuro Re-Ed             MTP LTP  Grn 5''20x Grn 5\"x20 GTB  5\" x20 ea GTB  5\" x20 ea GTB  5\" x20 GTB  5\" x20 BTB  5\" x20     TB ER IR   Green 5''20x Green 5\"x20 GTB  5\" x20 GTB 5''20x GTB  X20 IR;   ER GTB  x10   No resistnace x10 GTB  X20 IR;   ER GTB  x10   No resistnace x10 BTB  X20 IR;   ER BTB  x10   No resistnace x10     B/L TB ER              Supine Cane    Flex 10\"x10 5\" x10 5''10x 5\" x10 5\" x10 5\" x10     Compass             Supine ABC              UBE for postural correction   120  6/6 120 6'/6'  120 rpm  120 6'/6'  120 rpm 6'/6'  120 rpm 6'/6'  120 rpm                               Ther Ex             Pulley   5''20x flexion scaption  5''20x flexion scaption  5\" x20 flex/  scap 5\" x20 flex/  scap 5\"x20    Flex/  scap 5\"x20    Flex/  scap 5\"x20    Flex/  scap     Finger ladder   5''10x flex scap  5''10x flex scap  5\" x10 flex/scap 5\" x10 flex/scap 5\" x10  Flex/ scap 5\" x10  Flex/ scap 5\" x10  Flex/ scap  "    Upper trap stretch              Levator stretch                                                                  Ther Activity                                       Gait Training                                       Modalities             CP       X10 min  X10 min

## 2025-02-27 ENCOUNTER — OFFICE VISIT (OUTPATIENT)
Dept: PHYSICAL THERAPY | Facility: CLINIC | Age: 55
End: 2025-02-27
Payer: COMMERCIAL

## 2025-02-27 DIAGNOSIS — M25.511 CHRONIC RIGHT SHOULDER PAIN: Primary | ICD-10-CM

## 2025-02-27 DIAGNOSIS — G89.29 CHRONIC RIGHT SHOULDER PAIN: Primary | ICD-10-CM

## 2025-02-27 PROCEDURE — 97140 MANUAL THERAPY 1/> REGIONS: CPT

## 2025-02-27 PROCEDURE — 97110 THERAPEUTIC EXERCISES: CPT

## 2025-02-27 NOTE — PROGRESS NOTES
"Daily Note     Today's date: 2025  Patient name: Vu Mcdaniels  : 1970  MRN: [id number]  Referring provider: Markos Yousif MD  Dx:   Encounter Diagnosis     ICD-10-CM    1. Chronic right shoulder pain  M25.511     G89.29                      Subjective: Pt reports his R shoulder is sore. Awaiting MRI 3/17.       Objective: See treatment diary below      Assessment: Tolerated treatment well. Encouraged patient to perform all exercises in painfree ranges. Patient demonstrated fatigue post treatment, exhibited good technique with therapeutic exercises, and would benefit from continued PT      Plan: Continue per plan of care.        POC Expires Reeval for Medicare to be completed Unit LImit Auth Expiration Date PT/OT/STVisit Limit    By visit  10       Completed on                  VISIT TRACKER  DATE 1/29 2/3 2/7 2/10 2/12 2/17 2/20 2/24 2/27    Visit # 1 2 3 4 5 6 7 8 9    Remaining 9 8 7 6 5 4 3 2 1           Precautions: HTN       Manuals 1/29 2/3 2/7 2/10 2/12 2/17 2/20 2/24 2/27    PROM to right shoulder  RR RR MM MJC RR MJC RR RR MJC                                           Neuro Re-Ed             MTP LTP  Grn 5''20x Grn 5\"x20 GTB  5\" x20 ea GTB  5\" x20 ea GTB  5\" x20 GTB  5\" x20 BTB  5\" x20 Blue   5\" x20                                                                                                                                                                                                      TB ER IR   Green 5''20x Green 5\"x20 GTB  5\" x20 GTB 5''20x GTB  X20 IR;   ER GTB  x10   No resistnace x10 GTB  X20 IR;   ER GTB  x10   No resistnace x10 BTB  X20 IR;   ER BTB  x10   No resistnace x10 Blue TB  IR  X20     ER X10 Blue  X10 no resistance       B/L TB ER              Supine Cane    Flex 10\"x10 5\" x10 5''10x 5\" x10 5\" x10 5\" x10 5\" x10    Compass             Supine ABC              UBE for postural correction   120  6/6 120 6'/6'  120 rpm  120 6'/6'  120 rpm 6'/6'  120 rpm " "6'/6'  120 rpm 6'/6'  120 rpm                              Ther Ex             Pulley   5''20x flexion scaption  5''20x flexion scaption  5\" x20 flex/  scap 5\" x20 flex/  scap 5\"x20    Flex/  scap 5\"x20    Flex/  scap 5\"x20    Flex/  scap 5\" x20  Flex/scap    Finger ladder   5''10x flex scap  5''10x flex scap  5\" x10 flex/scap 5\" x10 flex/scap 5\" x10  Flex/ scap 5\" x10  Flex/ scap 5\" x10  Flex/ scap 5\" x10  Flex/ scap    Upper trap stretch              Levator stretch                                                                  Ther Activity                                       Gait Training                                       Modalities             CP       X10 min  X10 min                                    "

## 2025-03-03 ENCOUNTER — EVALUATION (OUTPATIENT)
Dept: PHYSICAL THERAPY | Facility: CLINIC | Age: 55
End: 2025-03-03
Payer: COMMERCIAL

## 2025-03-03 DIAGNOSIS — G89.29 CHRONIC RIGHT SHOULDER PAIN: Primary | ICD-10-CM

## 2025-03-03 DIAGNOSIS — M25.511 CHRONIC RIGHT SHOULDER PAIN: Primary | ICD-10-CM

## 2025-03-03 PROCEDURE — 97140 MANUAL THERAPY 1/> REGIONS: CPT | Performed by: PHYSICAL THERAPIST

## 2025-03-03 PROCEDURE — 97112 NEUROMUSCULAR REEDUCATION: CPT | Performed by: PHYSICAL THERAPIST

## 2025-03-03 PROCEDURE — 97110 THERAPEUTIC EXERCISES: CPT | Performed by: PHYSICAL THERAPIST

## 2025-03-03 NOTE — PROGRESS NOTES
PT Progress Note     Today's date: 3/3/2025  Patient name: Vu Mcdaniels  : 1970  MRN: [id number]  Referring provider: Markos Yousif MD  Dx:   Encounter Diagnosis     ICD-10-CM    1. Chronic right shoulder pain  M25.511     G89.29            Assessment  Impairments: abnormal coordination, abnormal muscle firing, abnormal or restricted ROM, activity intolerance, impaired physical strength, lacks appropriate home exercise program, pain with function and safety issue  Functional limitations: Difficulty with lifting at and above shoulder height.  Symptom irritability: moderate    Assessment details: Vu Mcdaniels is a 54 y.o. male who presents to outpatient PT with a  Chronic right shoulder pain  (primary encounter diagnosis).  No further referral appears necessary at this time based upon examination results. Exam findings consistent with rotator  cuff/ labral pathology. Pt presents with decreased strength, ROM, balance, functional activity tolerance, and pain with movement in his right shoulder  which is  limiting his ability to perform the aforementioned functional activities.  Etiologic factors include repetitive poor body mechanics. Prognosis is good given HEP compliance and PT 2-3/wk.  Please contact me if you have any questions or recommendations.  Thank you for the opportunity to share in  [facility].     RA 3/3    Vu Mcdaniels has demonstrated decreased pain, increased strength, increased range of motion, and increased activity tolerance since starting physical therapy services. Minimal improvements in the right shoulder since starting skilled PT. Pt is scheduled for MRI on 3/17. Will continue conservative PT until MRI. Pt in agreement with the plan. Weakness and decreased ROM Primarily into right shoulder abduction   They continue to present with pain, decreased strength, decreased range of motion,and decreased activity tolerance and would benefit from additional skilled physical therapy  interventions to address impairments and maximize function.      Understanding of Dx/Px/POC: good     Prognosis: good    Goals  STG to be achieved in 4 weeks     1. Pt will reduce subjective pain rating by at least 50 percent the help facilitate return to PLOF  Progressing   2. Pt will improve right shoulder PROM/ AROMby at least 10 degrees to help promote improved functional activity tolerance   Progressing   3. Pt will improve right shoulder MMT scores by at least 1/2 grade to promote improved functional activity tolerance   Progressing          LTG to be achieved in 6-8 weeks  1. Pt will be complaint with HEP  Progressing   2. Pt will improve Right shoulder AROM to WFL, to help facilitate independence with ADL's, IADL's, and functional activities   Progressing   3. Pt will improve right shoulder Strength to WFL to help facilitate independence with ADL's, IADL's, and functional activities   Progressing   4. Pt will have no limitations with ambulation to help facilitate independence at home and in the community.   Progressing   5. Pt will have no limitations with stair negotiation to help facilitate independence at home and in the community   Progressing           Plan  Patient would benefit from: skilled physical therapy    Planned therapy interventions: ADL training, balance, balance/weight bearing training, flexibility, functional ROM exercises, gait training, graded activity, graded exercise, graded motor, home exercise program, joint mobilization, manual therapy, neuromuscular re-education, patient education, postural training, strengthening, stretching, therapeutic activities and therapeutic exercise    Frequency: 2x week  Duration in weeks: 12  Plan of Care beginning date: 1/29/2025  Plan of Care expiration date: 4/28/2025  Treatment plan discussed with: patient, PTA and referring physician        Subjective Evaluation    History of Present Illness  Mechanism of injury: 54 year old male who presents to  outpatient PT with CC of right shoulder pain since . The patient reports he slipped and fell outside on soraya night, He fell on his left side, but reports increased pain in his right shoulder today. He reports localized pain to the lateral aspect of the right shoulder, reports pain in the shoulder feels like a pulling deep pain. Denies radicular sx. Pain is exacerbated with reaching into Hz adduction. He does report occasional clicking/ popping in the shoulder. Goals: to gain more function in the shoulder.   Patient Goals  Patient goals for therapy: increased strength, decreased pain and increased motion  Patient goal: to gain more motion in the shoulder  Pain    RA   Current pain ratin  8  At best pain ratin  5  At worst pain ratin  8  Quality: pressure  Aggravating factors: lifting  Progression: worsening        Objective     Active Range of Motion     Right Shoulder           RA 3/3  Flexion: 140 degrees           145   Abduction: 90 degrees          100  External rotation BTH: Active external rotation behind the head: occiput.    Occiput   Internal rotation BTB: Active internal rotation behind the back: Sacrum.    L5     Strength/Myotome Testing     Right Shoulder        RA   Planes of Motion   Flexion: 3-     3-  Abduction: 2     2  External rotation at 0°: 2   2  Internal rotation at 0°: 2   2    Tests     Right Shoulder   Positive active compression (Crescent) and empty can.                  POC Expires Reeval for Medicare to be completed Unit LImit Auth Expiration Date PT/OT/STVisit Limit    By visit  10       Completed on                  VISIT TRACKER  DATE 1/29 2/3 2/7 2/10 2/12 2/17 2/20 2/24 2/27 3/3   Visit # 1 2 3 4 5 6 7 8 9 RE   Remaining 9 8 7 6 5 4 3 2 1           Precautions: HTN       Manuals 1/29 2/3 2/7 2/10 2/12 2/17 2/20 2/24 2/27 3/3   PROM to right shoulder  RR RR MM MJC RR MJC RR RR MJC RR                                          Neuro Re-Ed             MTP LTP   "Grn 5''20x Grn 5\"x20 GTB  5\" x20 ea GTB  5\" x20 ea GTB  5\" x20 GTB  5\" x20 BTB  5\" x20 Blue   5\" x20                                                                                                                                                                                                   Blue   5\" x20    TB ER IR   Green 5''20x Green 5\"x20 GTB  5\" x20 GTB 5''20x GTB  X20 IR;   ER GTB  x10   No resistnace x10 GTB  X20 IR;   ER GTB  x10   No resistnace x10 BTB  X20 IR;   ER BTB  x10   No resistnace x10 Blue TB  IR  X20     ER X10 Blue  X10 no resistance    Blue TB  IR  X20     ER X10 Blue  X10 no resistance   B/L TB ER              Supine Cane    Flex 10\"x10 5\" x10 5''10x 5\" x10 5\" x10 5\" x10 5\" x10 5\" x10   Compass             Supine ABC              UBE for postural correction  6/6 120  6/6 120 6'/6'  120 rpm 6/6 120 6'/6'  120 rpm 6'/6'  120 rpm 6'/6'  120 rpm 6'/6'  120 rpm 6'/6'  120 rpm                             Ther Ex             Pulley   5''20x flexion scaption  5''20x flexion scaption  5\" x20 flex/  scap 5\" x20 flex/  scap 5\"x20    Flex/  scap 5\"x20    Flex/  scap 5\"x20    Flex/  scap 5\" x20  Flex/scap 5\" x20  Flex/scap   Finger ladder   5''10x flex scap  5''10x flex scap  5\" x10 flex/scap 5\" x10 flex/scap 5\" x10  Flex/ scap 5\" x10  Flex/ scap 5\" x10  Flex/ scap 5\" x10  Flex/ scap 5\" x10  Flex/ scap   Upper trap stretch              Levator stretch                                                                  Ther Activity                                       Gait Training                                       Modalities             CP       X10 min  X10 min                                      "

## 2025-03-04 ENCOUNTER — OFFICE VISIT (OUTPATIENT)
Dept: OBGYN CLINIC | Facility: CLINIC | Age: 55
End: 2025-03-04
Payer: COMMERCIAL

## 2025-03-04 VITALS — WEIGHT: 265 LBS | BODY MASS INDEX: 41.59 KG/M2 | HEIGHT: 67 IN

## 2025-03-04 DIAGNOSIS — R22.31 MASS OF RIGHT HAND: ICD-10-CM

## 2025-03-04 PROCEDURE — 99203 OFFICE O/P NEW LOW 30 MIN: CPT | Performed by: ORTHOPAEDIC SURGERY

## 2025-03-04 NOTE — PROGRESS NOTES
ASSESSMENT/PLAN:    Diagnoses and all orders for this visit:    Mass of right hand  -     Ambulatory Referral to Orthopedic Surgery  -     MRI hand right wo contrast; Future        X-rays of the patient's right hand are negative for any fractures or dislocations.  The patient does have a large mass along the volar aspect of his right hand.  Will order an MRI to rule out the etiology.  The patient will follow-up with our office once the MRI is complete.  He is acceptable to this plan.    Return for MRI results.    The patient has an ulnar-sided mass along the volar aspect of his right hand.  Would recommend obtaining an MRI to look for any potential etiology or consistency.  Return back once MRI is complete      _____________________________________________________  CHIEF COMPLAINT:  Chief Complaint   Patient presents with    Right Hand - Pain     Cyst, had a biopsy         SUBJECTIVE:  Vu Mcdaniels is a 54 y.o. male who presents to our office for evaluation of a mass along his right hand.  The patient states that he has had this mass for approximately 8 years.  The size and pain associated with the mass will wax and wane.  He denies any purulent discharge.  He denies any numbness or tingling.  He denies any fever or chills.    The following portions of the patient's history were reviewed and updated as appropriate: allergies, current medications, past family history, past medical history, past social history, past surgical history and problem list.    PAST MEDICAL HISTORY:  Past Medical History:   Diagnosis Date    Hypercholesterolemia     Hypertension     Prediabetes        PAST SURGICAL HISTORY:  Past Surgical History:   Procedure Laterality Date    HERNIA REPAIR Bilateral     REPAIR LABRUM Right        FAMILY HISTORY:  Family History   Problem Relation Age of Onset    Graves' disease Mother        SOCIAL HISTORY:  Social History     Tobacco Use    Smoking status: Some Days     Types: Cigars     Passive  exposure: Never    Smokeless tobacco: Never    Tobacco comments:     Smokes cigar on occasion   Vaping Use    Vaping status: Never Used   Substance Use Topics    Alcohol use: Yes    Drug use: Never       MEDICATIONS:    Current Outpatient Medications:     Alcohol Swabs (Alcohol Prep Pads) 70 % PADS, CHECK BLOOD SUGARS TWICE DAILY., Disp: 200 each, Rfl: 0    atorvastatin (LIPITOR) 40 mg tablet, TAKE 1 TABLET (40 MG TOTAL) BY MOUTH DAILY, Disp: 100 tablet, Rfl: 1    Blood Glucose Monitoring Suppl (OneTouch Verio Reflect) w/Device KIT, Check blood sugars twice daily. Please substitute with appropriate alternative as covered by patient's insurance. Dx: E11.65, Disp: 1 kit, Rfl: 0    Dulaglutide (Trulicity) 0.75 MG/0.5ML SOAJ, INJECT 0.5 ML (0.75 MG TOTAL) UNDER THE SKIN ONCE A WEEK, Disp: 6 mL, Rfl: 1    glucose blood (OneTouch Verio) test strip, Check blood sugars twice daily. Please substitute with appropriate alternative as covered by patient's insurance. Dx: E11.65, Disp: 200 each, Rfl: 3    losartan (COZAAR) 100 MG tablet, Take 1 tablet (100 mg total) by mouth daily, Disp: 15 tablet, Rfl: 0    losartan-hydrochlorothiazide (HYZAAR) 100-25 MG per tablet, TAKE 1 TABLET BY MOUTH DAILY, Disp: 30 tablet, Rfl: 5    OneTouch Delica Lancets 33G MISC, Check blood sugars twice daily. Please substitute with appropriate alternative as covered by patient's insurance. Dx: E11.65, Disp: 200 each, Rfl: 3    Trulicity 0.75 MG/0.5ML SOAJ, , Disp: , Rfl:     metFORMIN (GLUCOPHAGE-XR) 500 mg 24 hr tablet, Take 2 tablets (1,000 mg total) by mouth daily with dinner, Disp: 60 tablet, Rfl: 2    ALLERGIES:  Allergies   Allergen Reactions    Oxycodone-Acetaminophen Syncope     feint  Pt passes out         ROS:  Review of Systems     Constitutional: Negative for fatigue, fever or loss of appetite.   HENT: Negative.    Respiratory: Negative for shortness of breath, dyspnea.    Cardiovascular: Negative for chest pain/tightness.  "  Gastrointestinal: Negative for abdominal pain, N/V.   Endocrine: Negative for cold/heat intolerance, unexplained weight loss/gain.   Genitourinary: Negative for flank pain, dysuria, hematuria.   Musculoskeletal: Positive for arthralgia   Skin: Negative for rash.    Neurological: Negative for numbness or tingling  Psychiatric/Behavioral: Negative for agitation.  _____________________________________________________  PHYSICAL EXAMINATION:    Height 5' 7\" (1.702 m), weight 120 kg (265 lb).    Constitutional: Oriented to person, place, and time. Appears well-developed and well-nourished. No distress.   HENT:   Head: Normocephalic.   Eyes: Conjunctivae are normal. Right eye exhibits no discharge. Left eye exhibits no discharge. No scleral icterus.   Cardiovascular: Normal rate.    Pulmonary/Chest: Effort normal.   Neurological: Alert and oriented to person, place, and time.   Skin: Skin is warm and dry. No rash noted. Not diaphoretic. No erythema. No pallor.   Psychiatric: Normal mood and affect. Behavior is normal. Judgment and thought content normal.      MUSCULOSKELETAL EXAMINATION:   Physical Exam  Ortho Exam    Right upper extremity is neurovascular intact  Fingers are pink and mobile  Compartments are soft  Large mass along the volar aspect the patient's hand.  There is more along the ulnar side of the hand  Brisk cap refill  Sensation intact  No ricky pus  Objective:  BP Readings from Last 1 Encounters:   01/16/25 132/90      Wt Readings from Last 1 Encounters:   03/04/25 120 kg (265 lb)        BMI:   Estimated body mass index is 41.5 kg/m² as calculated from the following:    Height as of this encounter: 5' 7\" (1.702 m).    Weight as of this encounter: 120 kg (265 lb).        Scribe Attestation      I,:  Clive Solis PA-C am acting as a scribe while in the presence of the attending physician.:       I,:  Leonid Walden DO personally performed the services described in this documentation    as " scribed in my presence.:

## 2025-03-06 ENCOUNTER — OFFICE VISIT (OUTPATIENT)
Dept: PHYSICAL THERAPY | Facility: CLINIC | Age: 55
End: 2025-03-06
Payer: COMMERCIAL

## 2025-03-06 DIAGNOSIS — G89.29 CHRONIC RIGHT SHOULDER PAIN: Primary | ICD-10-CM

## 2025-03-06 DIAGNOSIS — M25.511 CHRONIC RIGHT SHOULDER PAIN: Primary | ICD-10-CM

## 2025-03-06 PROCEDURE — 97112 NEUROMUSCULAR REEDUCATION: CPT

## 2025-03-06 PROCEDURE — 97110 THERAPEUTIC EXERCISES: CPT

## 2025-03-06 PROCEDURE — 97140 MANUAL THERAPY 1/> REGIONS: CPT

## 2025-03-06 NOTE — PROGRESS NOTES
"Daily Note     Today's date: 3/6/2025  Patient name: Vu Mcdaniels  : 1970  MRN: [id number]  Referring provider: Markos Yousif MD  Dx:   Encounter Diagnosis     ICD-10-CM    1. Chronic right shoulder pain  M25.511     G89.29                      Subjective: Pt reports he has increased pain following PT treatments. Primarily c/o pain with TB ER.       Objective: See treatment diary below      Assessment: Tolerated treatment well. Held Tband resisted ER , performed AROM ER in sidelying without c/o. Patient demonstrated fatigue post treatment, exhibited good technique with therapeutic exercises, and would benefit from continued PT      Plan: Continue per plan of care.        POC Expires Reeval for Medicare to be completed Unit LImit Auth Expiration Date PT/OT/STVisit Limit    By visit  10       Completed on 3/3                 VISIT TRACKER  DATE 3/6 2/3 2/7 2/10 2/12 2/17 2/20 2/24 2/27 3/3   Visit # 1 2 3 4 5 6 7 8 9 RE   Remaining 9 8 7 6 5 4 3 2 1           Precautions: HTN       Manuals 3/6 2/3 2/7 2/10 2/12 2/17 2/20 2/24 2/27 3/3   PROM to right shoulder  MJC RR MM MJC RR MJC RR RR MJC RR                                          Neuro Re-Ed             MTP LTP Blue TB  5\" x20 Grn 5''20x Grn 5\"x20 GTB  5\" x20 ea GTB  5\" x20 ea GTB  5\" x20 GTB  5\" x20 BTB  5\" x20 Blue   5\" x20                                                                                                                                                                                                   Blue   5\" x20    TB ER IR  Blue IR x20 Green 5''20x Green 5\"x20 GTB  5\" x20 GTB 5''20x GTB  X20 IR;   ER GTB  x10   No resistnace x10 GTB  X20 IR;   ER GTB  x10   No resistnace x10 BTB  X20 IR;   ER BTB  x10   No resistnace x10 Blue TB  IR  X20     ER X10 Blue  X10 no resistance    Blue TB  IR  X20     ER X10 Blue  X10 no resistance   Sidelying ER 2x10                         Supine Cane    Flex 10\"x10 5\" x10 5''10x 5\" x10 5\" x10 5\" " "x10 5\" x10 5\" x10   Compass             Supine ABC              UBE for postural correction 5'/5'  120  rpm 6/6 120  6/6 120 6'/6'  120 rpm 6/6 120 6'/6'  120 rpm 6'/6'  120 rpm 6'/6'  120 rpm 6'/6'  120 rpm 6'/6'  120 rpm                             Ther Ex             Pulley  5\" x20 flex/  scap 5''20x flexion scaption  5''20x flexion scaption  5\" x20 flex/  scap 5\" x20 flex/  scap 5\"x20    Flex/  scap 5\"x20    Flex/  scap 5\"x20    Flex/  scap 5\" x20  Flex/scap 5\" x20  Flex/scap   Finger ladder  5\" x10 flex/   scap 5''10x flex scap  5''10x flex scap  5\" x10 flex/scap 5\" x10 flex/scap 5\" x10  Flex/ scap 5\" x10  Flex/ scap 5\" x10  Flex/ scap 5\" x10  Flex/ scap 5\" x10  Flex/ scap   Upper trap stretch              Levator stretch                                                                  Ther Activity                                       Gait Training                                       Modalities             CP  X10 min     X10 min  X10 min                                        "

## 2025-03-07 ENCOUNTER — RA CDI HCC (OUTPATIENT)
Dept: OTHER | Facility: HOSPITAL | Age: 55
End: 2025-03-07

## 2025-03-07 NOTE — PROGRESS NOTES
HCC coding opportunities       Chart reviewed, no opportunity found: CHART REVIEWED, NO OPPORTUNITY FOUND        Patients Insurance        Commercial Insurance: [employer]

## 2025-03-07 NOTE — PROGRESS NOTES
This encounter was created in error - please disregard.HCC coding opportunities       Chart reviewed, no opportunity found: CHART REVIEWED, NO OPPORTUNITY FOUND        Patients Insurance        Commercial Insurance: [employer]

## 2025-03-10 ENCOUNTER — OFFICE VISIT (OUTPATIENT)
Dept: PHYSICAL THERAPY | Facility: CLINIC | Age: 55
End: 2025-03-10
Payer: COMMERCIAL

## 2025-03-10 DIAGNOSIS — M25.511 CHRONIC RIGHT SHOULDER PAIN: Primary | ICD-10-CM

## 2025-03-10 DIAGNOSIS — G89.29 CHRONIC RIGHT SHOULDER PAIN: Primary | ICD-10-CM

## 2025-03-10 PROCEDURE — 97110 THERAPEUTIC EXERCISES: CPT

## 2025-03-10 PROCEDURE — 97140 MANUAL THERAPY 1/> REGIONS: CPT

## 2025-03-10 PROCEDURE — 97112 NEUROMUSCULAR REEDUCATION: CPT

## 2025-03-10 NOTE — PROGRESS NOTES
"Daily Note     Today's date: 3/10/2025  Patient name: Vu Mcdaniels  : 1970  MRN: [id number]  Referring provider: Markos Yousif MD  Dx:   Encounter Diagnosis     ICD-10-CM    1. Chronic right shoulder pain  M25.511     G89.29                      Subjective: Pt denies any adverse effects following last treatment. States his R shoulder is sore today just from doing every day activities around the house over the weekend. Reports ibuprofen does nothing to relieve pain.       Objective: See treatment diary below      Assessment: Tolerated treatment well. Gradual progression with repetitions today for scapular strengthening. Patient demonstrated fatigue post treatment, exhibited good technique with therapeutic exercises, and would benefit from continued PT. Patient awaiting MRI and return to doctor. Will require precert for continued PT visits secondary to insurance requirements.       Plan: Continue per plan of care.        POC Expires Reeval for Medicare to be completed Unit LImit Auth Expiration Date PT/OT/STVisit Limit    By visit  10       Completed on 3/3                 VISIT TRACKER  DATE 3/6 3/10 2/7 2/10 2/12 2/17 2/20 2/24 2/27 3/3   Visit # 1 2 3 4 5 6 7 8 9 RE   Remaining 9 8 7 6 5 4 3 2 1           Precautions: HTN       Manuals 3/6 3/10 2/7 2/10 2/12 2/17 2/20 2/24 2/27 3/3   PROM to right shoulder  MJC MJC MM MJC RR MJC RR RR MJC RR                                          Neuro Re-Ed             MTP LTP Blue TB  5\" x20 Blue  5\"  3 x10 Grn 5\"x20 GTB  5\" x20 ea GTB  5\" x20 ea GTB  5\" x20 GTB  5\" x20 BTB  5\" x20 Blue   5\" x20                                                                                                                                                                                                   Blue   5\" x20    TB ER IR  Blue IR x20 Blue  3 x10 Green 5\"x20 GTB  5\" x20 GTB 5''20x GTB  X20 IR;   ER GTB  x10   No resistnace x10 GTB  X20 IR;   ER GTB  x10   No resistnace " "x10 BTB  X20 IR;   ER BTB  x10   No resistnace x10 Blue TB  IR  X20     ER X10 Blue  X10 no resistance    Blue TB  IR  X20     ER X10 Blue  X10 no resistance   Sidelying ER 2x10 2x10                        Supine Cane   Flex   5\" x10 Flex 10\"x10 5\" x10 5''10x 5\" x10 5\" x10 5\" x10 5\" x10 5\" x10   Compass             Supine ABC              UBE for postural correction 5'/5'  120  rpm 5'/5'  120 rpm 6/6 120 6'/6'  120 rpm 6/6 120 6'/6'  120 rpm 6'/6'  120 rpm 6'/6'  120 rpm 6'/6'  120 rpm 6'/6'  120 rpm                             Ther Ex             Pulley  5\" x20 flex/  scap 5''20x flexion scaption  5''20x flexion scaption  5\" x20 flex/  scap 5\" x20 flex/  scap 5\"x20    Flex/  scap 5\"x20    Flex/  scap 5\"x20    Flex/  scap 5\" x20  Flex/scap 5\" x20  Flex/scap   Finger ladder  5\" x10 flex/   scap 5''10x flex scap  5''10x flex scap  5\" x10 flex/scap 5\" x10 flex/scap 5\" x10  Flex/ scap 5\" x10  Flex/ scap 5\" x10  Flex/ scap 5\" x10  Flex/ scap 5\" x10  Flex/ scap   Upper trap stretch              Levator stretch                                                                  Ther Activity                                       Gait Training                                       Modalities             CP  X10 min X10 min    X10 min  X10 min                                          "

## 2025-03-12 ENCOUNTER — HOSPITAL ENCOUNTER (OUTPATIENT)
Dept: MRI IMAGING | Facility: HOSPITAL | Age: 55
Discharge: HOME | End: 2025-03-12
Attending: PHYSICIAN ASSISTANT
Payer: COMMERCIAL

## 2025-03-12 DIAGNOSIS — R22.31 MASS OF RIGHT HAND: ICD-10-CM

## 2025-03-12 PROCEDURE — 73218 MRI UPPER EXTREMITY W/O DYE: CPT

## 2025-03-12 NOTE — PRE-PROCEDURE INSTRUCTIONS
Spoke with patient in person to discuss upcoming MRI arthrogram at [facility]. Allergies reviewed and verified patient does not currently take any anticoagulant medications. Pre-procedure instructions discussed with patient. Patient instructed that he may eat normally and take medications as usual before the procedure. Procedure and post procedure expectations and instructions reviewed with the patient. Reminder given to patient of appointment date and time of 3/17 at 2:15pm. Patient verbalizes understanding and denies any questions at this time.

## 2025-03-13 ENCOUNTER — APPOINTMENT (OUTPATIENT)
Dept: PHYSICAL THERAPY | Facility: CLINIC | Age: 55
End: 2025-03-13
Payer: COMMERCIAL

## 2025-03-17 ENCOUNTER — HOSPITAL ENCOUNTER (OUTPATIENT)
Dept: MRI IMAGING | Facility: HOSPITAL | Age: 55
Discharge: HOME | End: 2025-03-17
Attending: FAMILY MEDICINE
Payer: COMMERCIAL

## 2025-03-17 ENCOUNTER — HOSPITAL ENCOUNTER (OUTPATIENT)
Dept: RADIOLOGY | Facility: HOSPITAL | Age: 55
Discharge: HOME | End: 2025-03-17
Attending: FAMILY MEDICINE
Payer: COMMERCIAL

## 2025-03-17 DIAGNOSIS — R29.898 WEAKNESS OF RIGHT UPPER EXTREMITY: ICD-10-CM

## 2025-03-17 DIAGNOSIS — M25.511 ACUTE PAIN OF RIGHT SHOULDER: ICD-10-CM

## 2025-03-17 DIAGNOSIS — S49.91XA SHOULDER INJURY, RIGHT, INITIAL ENCOUNTER: ICD-10-CM

## 2025-03-17 DIAGNOSIS — M67.441 GANGLION CYST OF FINGER OF RIGHT HAND: ICD-10-CM

## 2025-03-17 PROCEDURE — 73222 MRI JOINT UPR EXTREM W/DYE: CPT

## 2025-03-17 PROCEDURE — 23350 INJECTION FOR SHOULDER X-RAY: CPT

## 2025-03-17 PROCEDURE — A9585 GADOBUTROL INJECTION: HCPCS | Performed by: FAMILY MEDICINE

## 2025-03-17 PROCEDURE — 77002 NEEDLE LOCALIZATION BY XRAY: CPT

## 2025-03-17 RX ORDER — ROPIVACAINE HYDROCHLORIDE 2 MG/ML
2 INJECTION, SOLUTION EPIDURAL; INFILTRATION; PERINEURAL ONCE
Status: COMPLETED | OUTPATIENT
Start: 2025-03-17 | End: 2025-03-17

## 2025-03-17 RX ORDER — GADOBUTROL 604.72 MG/ML
2 INJECTION INTRAVENOUS
Status: COMPLETED | OUTPATIENT
Start: 2025-03-17 | End: 2025-03-17

## 2025-03-17 RX ORDER — LIDOCAINE HYDROCHLORIDE 10 MG/ML
4 INJECTION, SOLUTION EPIDURAL; INFILTRATION; INTRACAUDAL; PERINEURAL
Status: COMPLETED | OUTPATIENT
Start: 2025-03-17 | End: 2025-03-17

## 2025-03-17 RX ADMIN — GADOBUTROL 0.2 ML: 604.72 INJECTION INTRAVENOUS at 14:29

## 2025-03-17 RX ADMIN — IOHEXOL 4 ML: 300 INJECTION, SOLUTION INTRAVENOUS at 14:28

## 2025-03-17 RX ADMIN — ROPIVACAINE HYDROCHLORIDE 2 ML: 2 INJECTION, SOLUTION EPIDURAL; INFILTRATION at 14:27

## 2025-03-17 RX ADMIN — LIDOCAINE HYDROCHLORIDE 4 ML: 10 INJECTION, SOLUTION EPIDURAL; INFILTRATION; INTRACAUDAL; PERINEURAL at 14:26

## 2025-03-18 ENCOUNTER — OFFICE VISIT (OUTPATIENT)
Dept: OBGYN CLINIC | Facility: CLINIC | Age: 55
End: 2025-03-18
Payer: COMMERCIAL

## 2025-03-18 VITALS — HEIGHT: 67 IN | WEIGHT: 265 LBS | BODY MASS INDEX: 41.59 KG/M2

## 2025-03-18 DIAGNOSIS — Z01.812 PRE-OPERATIVE LABORATORY EXAMINATION: ICD-10-CM

## 2025-03-18 DIAGNOSIS — R22.31 MASS OF RIGHT HAND: Primary | ICD-10-CM

## 2025-03-18 PROCEDURE — 99213 OFFICE O/P EST LOW 20 MIN: CPT | Performed by: ORTHOPAEDIC SURGERY

## 2025-03-18 RX ORDER — CHLORHEXIDINE GLUCONATE ORAL RINSE 1.2 MG/ML
15 SOLUTION DENTAL ONCE
OUTPATIENT
Start: 2025-03-18 | End: 2025-03-18

## 2025-03-18 RX ORDER — CHLORHEXIDINE GLUCONATE 40 MG/ML
SOLUTION TOPICAL DAILY PRN
OUTPATIENT
Start: 2025-03-18

## 2025-03-18 NOTE — PROGRESS NOTES
Assessment & Plan  Mass of right hand       Discussed the patient's diagnosis and associated treatment options including conservative and surgical treatment. Discussed risks, potential complications, and benefits of surgical procedure in great detail. The patient understands the risks and benefits of all mention treatment options and has no further questions.  The patient has elected to proceed forward with excision of a mass of the right hand. Surgery is tentatively scheduled for May 7, 2025. He will be seen for follow-up 10 to 14 days post-op for reevaluation. A surgical consent was obtained at today's visit. The patient expressed understanding and had the opportunity to ask questions.   Pre-operative laboratory examination      The patient has an ulnar-sided mass of his right hand that appears to by lipomatous by an MRI.  Would recommend excision and biopsy.  All risk, complications, benefits were discussed with the patient in great detail including bleeding, infection, blood clots, pain, stiffness, neurovascular damage, fractures, dislocations, the possibility loss elective surgery, wound problems, recurrence of mass, the possibility that he will need further adjuvant treatment, etc.  His surgery scheduled for May 7, 2025           Subjective:   Patient ID: Vu Mcdaniels  1970     HPI  Patient is a 54 y.o. male who presents for follow-up evaluation and MRI review of his right hand. The patient has a mass along the ulnar aspect of his right hand. The mass interferes with gripping and grasping activities. He denies numbness or tingling. He denies any signs of infection.     The following portions of the patient's history were reviewed and updated as appropriate:  Past medical history, past surgical history, Family history, social history, current medications and allergies    Past Medical History:   Diagnosis Date    Hypercholesterolemia     Hypertension     Prediabetes        Past Surgical History:    Procedure Laterality Date    FL INJECTION RIGHT SHOULDER (ARTHROGRAM)  3/17/2025    HERNIA REPAIR Bilateral     REPAIR LABRUM Right        Family History   Problem Relation Age of Onset    Graves' disease Mother        Social History     Socioeconomic History    Marital status: /Civil Union     Spouse name: None    Number of children: None    Years of education: None    Highest education level: None   Occupational History    None   Tobacco Use    Smoking status: Some Days     Types: Cigars     Passive exposure: Never    Smokeless tobacco: Never    Tobacco comments:     Smokes cigar on occasion   Vaping Use    Vaping status: Never Used   Substance and Sexual Activity    Alcohol use: Yes    Drug use: Never    Sexual activity: None   Other Topics Concern    None   Social History Narrative    None     Social Drivers of Health     Financial Resource Strain: Not on file   Food Insecurity: Not on file   Transportation Needs: Not on file   Physical Activity: Not on file   Stress: Not on file   Social Connections: Not on file   Intimate Partner Violence: Not on file   Housing Stability: Not on file         Current Outpatient Medications:     Alcohol Swabs (Alcohol Prep Pads) 70 % PADS, CHECK BLOOD SUGARS TWICE DAILY., Disp: 200 each, Rfl: 0    atorvastatin (LIPITOR) 40 mg tablet, TAKE 1 TABLET (40 MG TOTAL) BY MOUTH DAILY, Disp: 100 tablet, Rfl: 1    Blood Glucose Monitoring Suppl (OneTouch Verio Reflect) w/Device KIT, Check blood sugars twice daily. Please substitute with appropriate alternative as covered by patient's insurance. Dx: E11.65, Disp: 1 kit, Rfl: 0    Dulaglutide (Trulicity) 0.75 MG/0.5ML SOAJ, INJECT 0.5 ML (0.75 MG TOTAL) UNDER THE SKIN ONCE A WEEK, Disp: 6 mL, Rfl: 1    glucose blood (OneTouch Verio) test strip, Check blood sugars twice daily. Please substitute with appropriate alternative as covered by patient's insurance. Dx: E11.65, Disp: 200 each, Rfl: 3    losartan (COZAAR) 100 MG tablet, Take  "1 tablet (100 mg total) by mouth daily, Disp: 15 tablet, Rfl: 0    losartan-hydrochlorothiazide (HYZAAR) 100-25 MG per tablet, TAKE 1 TABLET BY MOUTH DAILY, Disp: 30 tablet, Rfl: 5    OneTouch Delica Lancets 33G MISC, Check blood sugars twice daily. Please substitute with appropriate alternative as covered by patient's insurance. Dx: E11.65, Disp: 200 each, Rfl: 3    Trulicity 0.75 MG/0.5ML SOAJ, , Disp: , Rfl:     metFORMIN (GLUCOPHAGE-XR) 500 mg 24 hr tablet, Take 2 tablets (1,000 mg total) by mouth daily with dinner, Disp: 60 tablet, Rfl: 2  No current facility-administered medications for this visit.    Allergies   Allergen Reactions    Oxycodone-Acetaminophen Syncope     feint  Pt passes out         Review of Systems   Constitutional:  Negative for chills and fever.   HENT:  Negative for ear pain and sore throat.    Eyes:  Negative for pain and visual disturbance.   Respiratory:  Negative for cough and shortness of breath.    Cardiovascular:  Negative for chest pain and palpitations.   Gastrointestinal:  Negative for abdominal pain and vomiting.   Genitourinary:  Negative for dysuria and hematuria.   Musculoskeletal:  Negative for arthralgias and back pain.   Skin:  Negative for color change and rash.   Neurological:  Negative for seizures and syncope.   All other systems reviewed and are negative.       Objective:  Ht 5' 7\" (1.702 m)   Wt 120 kg (265 lb)   BMI 41.50 kg/m²     Ortho Exam  right Hand -    Patient presents with large mass on the ulnar aspect of the hand  Skin is warm and dry to touch with no signs of erythema, ecchymosis, or infection  No soft tissue swelling or effusion noted  Full FDS, FDP, extensor mechanisms are intact  Demonstrates normal wrist, elbow, and shoulder motion  Forearm compartments are soft and supple  2+ distal radial pulse with brisk capillary refill to the fingers  Radial, median, and ulnar motor and sensory distribution intact  Sensations light to touch intact " distally      Physical Exam  HENT:      Head: Normocephalic and atraumatic.      Nose: Nose normal.   Eyes:      Conjunctiva/sclera: Conjunctivae normal.   Cardiovascular:      Rate and Rhythm: Normal rate.   Pulmonary:      Effort: Pulmonary effort is normal.   Musculoskeletal:      Cervical back: Neck supple.   Skin:     General: Skin is warm and dry.      Capillary Refill: Capillary refill takes less than 2 seconds.   Neurological:      Mental Status: He is alert and oriented to person, place, and time.   Psychiatric:         Mood and Affect: Mood normal.         Behavior: Behavior normal.          Diagnostic Test Review:  MRI of right hand obtained on 3/12/2025 were reviewed and demonstrate a subcutaneous lipomatous mass at the ulnar aspect of the palm that measures 4.9 x 2.1 x 7.7 cm in overall extent. This mass is statistically a lipoma .      Procedures   None performed.     Scribe Attestation      I,:  Renetta Ramos am acting as a scribe while in the presence of the attending physician.:       I,:  Leonid Walden, DO personally performed the services described in this documentation    as scribed in my presence.:

## 2025-03-19 ENCOUNTER — CONSULT (OUTPATIENT)
Dept: GASTROENTEROLOGY | Facility: CLINIC | Age: 55
End: 2025-03-19
Payer: COMMERCIAL

## 2025-03-19 VITALS
HEIGHT: 67 IN | WEIGHT: 272.4 LBS | HEART RATE: 95 BPM | SYSTOLIC BLOOD PRESSURE: 144 MMHG | BODY MASS INDEX: 42.75 KG/M2 | TEMPERATURE: 97.3 F | DIASTOLIC BLOOD PRESSURE: 84 MMHG | OXYGEN SATURATION: 98 %

## 2025-03-19 DIAGNOSIS — R19.5 POSITIVE COLORECTAL CANCER SCREENING USING COLOGUARD TEST: ICD-10-CM

## 2025-03-19 PROCEDURE — 99203 OFFICE O/P NEW LOW 30 MIN: CPT | Performed by: PHYSICIAN ASSISTANT

## 2025-03-19 NOTE — PROGRESS NOTES
Name: Vu Mcdaniels      : 1970      MRN: [id number]  Encounter Provider: Jane Tello PA-C  Encounter Date: 3/19/2025   Encounter department: [facility] [place]  :  Assessment & Plan  Positive colorectal cancer screening using Cologuard test  He has never had a colonoscopy  Will proceed with colonoscopy   Discussed risks and benefits  He wishes to have this done at the [facility]  He is concerned as he has had issues with propofol in the past - advised he discuss with anesthesia    He understands he will need to hold his Trulicity for 1 week prior to procedure      History of Present Illness   HPI  Vu Mcdaniels is a 54 y.o. male with no significant gastrointestinal history who presents for evaluation of a positive Cologuard stool test.  This was done as part of routine screening in August of last year.  He was initially set up for a colonoscopy in October but had to cancel this due to social issues.  He reports a long history of loose bowel movements which he related to taking metformin.  He discussed this with his family doctor recently who put him on Trulicity with the metformin to try to balance this out.  He admits that it is helping.  He has no obvious blood in his stool.  He denies any significant abdominal pain.  He reports occasional episodes of reflux but this is not common.  He has no dysphagia.  He has no hematemesis or melena.  He has never had a colonoscopy.  He denies any known family history of colon cancer.      Review of Systems   Constitutional:  Negative for activity change, appetite change, fatigue, fever and unexpected weight change.   Respiratory:  Negative for cough, shortness of breath and wheezing.    Gastrointestinal:  Positive for constipation and diarrhea. Negative for abdominal distention, abdominal pain, blood in stool, nausea and vomiting.   Endocrine: Negative for cold intolerance and heat intolerance.   Genitourinary:   Negative for dysuria, flank pain and hematuria.   Neurological:  Negative for dizziness, numbness and headaches.     Medical History Reviewed by provider this encounter:  Tobacco  Allergies  Meds  Problems  Med Hx  Surg Hx  Fam Hx     .  Past Medical History   Past Medical History:   Diagnosis Date    Hypercholesterolemia     Hypertension     Prediabetes      Past Surgical History:   Procedure Laterality Date    FL INJECTION RIGHT SHOULDER (ARTHROGRAM)  3/17/2025    HERNIA REPAIR Bilateral     REPAIR LABRUM Right      Family History   Problem Relation Age of Onset    Graves' disease Mother       reports that he has been smoking cigars. He has never been exposed to tobacco smoke. He has never used smokeless tobacco. He reports current alcohol use. He reports that he does not use drugs.  Current Outpatient Medications   Medication Instructions    Alcohol Swabs (Alcohol Prep Pads) 70 % PADS CHECK BLOOD SUGARS TWICE DAILY.    atorvastatin (LIPITOR) 40 mg, Oral, Daily    Blood Glucose Monitoring Suppl (OneTouch Verio Reflect) w/Device KIT Check blood sugars twice daily. Please substitute with appropriate alternative as covered by patient's insurance. Dx: E11.65    Dulaglutide (Trulicity) 0.75 MG/0.5ML SOAJ INJECT 0.5 ML (0.75 MG TOTAL) UNDER THE SKIN ONCE A WEEK    glucose blood (OneTouch Verio) test strip Check blood sugars twice daily. Please substitute with appropriate alternative as covered by patient's insurance. Dx: E11.65    losartan-hydrochlorothiazide (HYZAAR) 100-25 MG per tablet 1 tablet, Oral, Daily    metFORMIN (GLUCOPHAGE-XR) 1,000 mg, Oral, Daily with dinner    OneTouch Delica Lancets 33G MISC Check blood sugars twice daily. Please substitute with appropriate alternative as covered by patient's insurance. Dx: E11.65     Allergies   Allergen Reactions    Oxycodone-Acetaminophen Syncope     feint  Pt passes out        Current Outpatient Medications on File Prior to Visit   Medication Sig Dispense  "Refill    Alcohol Swabs (Alcohol Prep Pads) 70 % PADS CHECK BLOOD SUGARS TWICE DAILY. 200 each 0    atorvastatin (LIPITOR) 40 mg tablet TAKE 1 TABLET (40 MG TOTAL) BY MOUTH DAILY 100 tablet 1    Blood Glucose Monitoring Suppl (OneTouch Verio Reflect) w/Device KIT Check blood sugars twice daily. Please substitute with appropriate alternative as covered by patient's insurance. Dx: E11.65 1 kit 0    Dulaglutide (Trulicity) 0.75 MG/0.5ML SOAJ INJECT 0.5 ML (0.75 MG TOTAL) UNDER THE SKIN ONCE A WEEK 6 mL 1    glucose blood (OneTouch Verio) test strip Check blood sugars twice daily. Please substitute with appropriate alternative as covered by patient's insurance. Dx: E11.65 200 each 3    losartan-hydrochlorothiazide (HYZAAR) 100-25 MG per tablet TAKE 1 TABLET BY MOUTH DAILY 30 tablet 5    metFORMIN (GLUCOPHAGE-XR) 500 mg 24 hr tablet Take 2 tablets (1,000 mg total) by mouth daily with dinner 60 tablet 2    OneTouch Delica Lancets 33G MISC Check blood sugars twice daily. Please substitute with appropriate alternative as covered by patient's insurance. Dx: E11.65 200 each 3    [DISCONTINUED] losartan (COZAAR) 100 MG tablet Take 1 tablet (100 mg total) by mouth daily (Patient not taking: Reported on 3/19/2025) 15 tablet 0    [DISCONTINUED] Trulicity 0.75 MG/0.5ML SOAJ  (Patient not taking: Reported on 3/19/2025)       No current facility-administered medications on file prior to visit.      Social History     Tobacco Use    Smoking status: Some Days     Types: Cigars     Passive exposure: Never    Smokeless tobacco: Never    Tobacco comments:     Smokes cigar on occasion   Vaping Use    Vaping status: Never Used   Substance and Sexual Activity    Alcohol use: Yes    Drug use: Never    Sexual activity: Not on file        Objective   /84 (BP Location: Left arm, Patient Position: Sitting, Cuff Size: Standard)   Pulse 95   Temp (!) 97.3 °F (36.3 °C) (Temporal)   Ht 5' 7\" (1.702 m)   Wt 124 kg (272 lb 6.4 oz)   SpO2 " 98%   BMI 42.66 kg/m²      Physical Exam  Vitals reviewed.   Constitutional:       Appearance: He is obese.   HENT:      Head: Normocephalic and atraumatic.   Eyes:      Extraocular Movements: Extraocular movements intact.      Pupils: Pupils are equal, round, and reactive to light.   Cardiovascular:      Rate and Rhythm: Normal rate and regular rhythm.   Abdominal:      General: Bowel sounds are normal. There is no distension.      Palpations: Abdomen is soft. There is no mass.      Tenderness: There is no abdominal tenderness.   Skin:     General: Skin is warm and dry.      Coloration: Skin is not jaundiced.   Neurological:      General: No focal deficit present.      Mental Status: He is alert and oriented to person, place, and time.

## 2025-03-20 ENCOUNTER — TELEPHONE (OUTPATIENT)
Dept: GASTROENTEROLOGY | Facility: CLINIC | Age: 55
End: 2025-03-20

## 2025-03-24 ENCOUNTER — OFFICE VISIT (OUTPATIENT)
Dept: OBGYN CLINIC | Facility: CLINIC | Age: 55
End: 2025-03-24
Payer: COMMERCIAL

## 2025-03-24 VITALS
BODY MASS INDEX: 43.1 KG/M2 | OXYGEN SATURATION: 97 % | HEART RATE: 86 BPM | WEIGHT: 274.6 LBS | HEIGHT: 67 IN | TEMPERATURE: 98.3 F

## 2025-03-24 DIAGNOSIS — S46.811A TRAUMATIC RUPTURE OF SUPRASPINATUS TENDON OF RIGHT SHOULDER, INITIAL ENCOUNTER: Primary | ICD-10-CM

## 2025-03-24 PROCEDURE — 99214 OFFICE O/P EST MOD 30 MIN: CPT | Performed by: FAMILY MEDICINE

## 2025-03-24 NOTE — PROGRESS NOTES
[facility] [address] [id number] [phone]      Assessment:  1. Traumatic rupture of supraspinatus tendon of right shoulder, initial encounter  -     Ambulatory Referral to Orthopedic Surgery; Future    Assessment & Plan  Traumatic rupture of supraspinatus tendon of right shoulder, initial encounter    Orders:    Ambulatory Referral to Orthopedic Surgery; Future      Patient Instructions   Referral to Ortho  Plan:  Patient Instructions   Referral to Ortho   Return for referral to Ortho, Recheck.    Chief Complaint:  Chief Complaint   Patient presents with    Right Shoulder - Follow-up     Follow up to MRI       Subjective:   HPI    Patient ID: Vu Mcdaniels is a 54 y.o. male     Here for f/u  R shoulder pain/MRI  Still having pain  Pain with movement- reaching out  Ibuprofen PRN- not much help  Heating pad/cold-  helps    MRI ARTHROGRAM RIGHT SHOULDER     INDICATION:   M25.511: Pain in right shoulder  M67.441: Ganglion, right hand  S49.91XA: Unspecified injury of right shoulder and upper arm, initial encounter  R29.898: Other symptoms and signs involving the musculoskeletal system.     Pain for about 2 months. On 12/24/2024 fell down the steps with right hand holding onto the banister.     COMPARISON: Fluoroscopic guided images 3/17/2025, right shoulder radiograph 1/9/2025     TECHNIQUE:  Multiplanar/multisequence MR of the right shoulder was performed. Scan was performed after intraarticular injection of dilute gadolinium under fluoroscopic guidance into the joint.  Imaging performed on 1.5T MRI     FINDINGS:     SUBCUTANEOUS TISSUES: Normal.     JOINT CAPSULE: Intact, without inferior extravasation of contrast.     SUBACROMIAL/SUBDELTOID BURSA: Contains contrast material.     ACROMION PROCESS: Normal.     ROTATOR CUFF:  -Supraspinatus: Torn tendon (series 5, images 11-13)  -Thickness: Full thickness.  -AP Location:  "Posterior.  -AP extent (in cm): 0.8 cm  -Proximal/distal location: Insertional.  -Tendon retraction: None.  -Muscle atrophy: None.  -infraspinatus: Tendinosis.  -Subscapularis: Intact.  -Teres minor: Intact.     LONG HEAD OF BICEPS TENDON: Normal.     GLENOID LABRUM: Global labral degeneration and/or postoperative changes.     GLENOHUMERAL JOINT: Intact.     ACROMIOCLAVICULAR JOINT: Normal.     BONES: Normal.        IMPRESSION:     Full-thickness insertional tear of the posterior supraspinatus tendon without tendon retraction or muscle atrophy.     Infraspinatus tendinosis.     Resident: Gregory Viveros       Review of Systems   Constitutional:  Negative for fatigue and fever.   Respiratory:  Negative for shortness of breath.    Cardiovascular:  Negative for chest pain.   Gastrointestinal:  Negative for abdominal pain and nausea.   Genitourinary:  Negative for dysuria.   Musculoskeletal:  Positive for arthralgias.   Skin:  Negative for rash and wound.   Neurological:  Negative for weakness and headaches.       Objective:  Vitals:  Pulse 86   Temp 98.3 °F (36.8 °C) (Tympanic)   Ht 5' 7\" (1.702 m)   Wt 125 kg (274 lb 9.6 oz)   SpO2 97%   BMI 43.01 kg/m²     The following portions of the patient's history were reviewed and updated as appropriate: allergies, current medications, past family history, past medical history, past social history, past surgical history, and problem list.    Physical exam:  Physical Exam  Constitutional:       Appearance: Normal appearance. He is normal weight.   Eyes:      Extraocular Movements: Extraocular movements intact.   Pulmonary:      Effort: Pulmonary effort is normal.   Musculoskeletal:      Cervical back: Normal range of motion.   Skin:     General: Skin is warm and dry.   Neurological:      General: No focal deficit present.      Mental Status: He is alert and oriented to person, place, and time. Mental status is at baseline.   Psychiatric:         Mood and Affect: Mood " normal.         Behavior: Behavior normal.         Thought Content: Thought content normal.         Judgment: Judgment normal.       Right Shoulder Exam     Tenderness   The patient is experiencing tenderness in the biceps tendon.    Range of Motion   Active abduction:  abnormal   Passive abduction:  abnormal   Extension:  normal   External rotation:  normal   Forward flexion:  abnormal   Internal rotation 0 degrees:  normal   Internal rotation 90 degrees:  normal     Muscle Strength   Abduction: 3/5   Internal rotation: 4/5   External rotation: 4/5   Supraspinatus: 3/5   Subscapularis: 4/5     Tests   Valdes test: positive            I have personally reviewed pertinent films in PACS and my interpretation is MRI R shoulder- full thickness supraspinatus tendon tear..      Buddy Dillard MD

## 2025-03-25 ENCOUNTER — OFFICE VISIT (OUTPATIENT)
Dept: FAMILY MEDICINE CLINIC | Facility: CLINIC | Age: 55
End: 2025-03-25
Payer: COMMERCIAL

## 2025-03-25 ENCOUNTER — OFFICE VISIT (OUTPATIENT)
Dept: OBGYN CLINIC | Facility: CLINIC | Age: 55
End: 2025-03-25
Payer: COMMERCIAL

## 2025-03-25 VITALS
WEIGHT: 274 LBS | SYSTOLIC BLOOD PRESSURE: 122 MMHG | TEMPERATURE: 98.3 F | HEART RATE: 64 BPM | OXYGEN SATURATION: 98 % | DIASTOLIC BLOOD PRESSURE: 84 MMHG | HEIGHT: 67 IN | BODY MASS INDEX: 43 KG/M2

## 2025-03-25 VITALS — HEIGHT: 67 IN | BODY MASS INDEX: 43 KG/M2 | WEIGHT: 274 LBS

## 2025-03-25 DIAGNOSIS — E11.69 TYPE 2 DIABETES MELLITUS WITH OTHER SPECIFIED COMPLICATION, WITHOUT LONG-TERM CURRENT USE OF INSULIN (HCC): Primary | ICD-10-CM

## 2025-03-25 DIAGNOSIS — S46.811A TRAUMATIC RUPTURE OF SUPRASPINATUS TENDON OF RIGHT SHOULDER, INITIAL ENCOUNTER: ICD-10-CM

## 2025-03-25 DIAGNOSIS — E78.5 HYPERLIPIDEMIA, UNSPECIFIED HYPERLIPIDEMIA TYPE: ICD-10-CM

## 2025-03-25 DIAGNOSIS — L03.031 PARONYCHIA OF GREAT TOE OF RIGHT FOOT: ICD-10-CM

## 2025-03-25 DIAGNOSIS — M79.641 RIGHT HAND PAIN: ICD-10-CM

## 2025-03-25 DIAGNOSIS — Z01.812 PRE-OPERATIVE LABORATORY EXAMINATION: Primary | ICD-10-CM

## 2025-03-25 DIAGNOSIS — I10 HYPERTENSION, UNSPECIFIED TYPE: ICD-10-CM

## 2025-03-25 LAB — SL AMB POCT HEMOGLOBIN AIC: 7.1 (ref ?–6.5)

## 2025-03-25 PROCEDURE — 83036 HEMOGLOBIN GLYCOSYLATED A1C: CPT | Performed by: STUDENT IN AN ORGANIZED HEALTH CARE EDUCATION/TRAINING PROGRAM

## 2025-03-25 PROCEDURE — 99214 OFFICE O/P EST MOD 30 MIN: CPT | Performed by: STUDENT IN AN ORGANIZED HEALTH CARE EDUCATION/TRAINING PROGRAM

## 2025-03-25 PROCEDURE — 99213 OFFICE O/P EST LOW 20 MIN: CPT | Performed by: ORTHOPAEDIC SURGERY

## 2025-03-25 RX ORDER — CHLORHEXIDINE GLUCONATE ORAL RINSE 1.2 MG/ML
15 SOLUTION DENTAL ONCE
OUTPATIENT
Start: 2025-03-25 | End: 2025-03-25

## 2025-03-25 RX ORDER — CEPHALEXIN 500 MG/1
500 CAPSULE ORAL EVERY 8 HOURS SCHEDULED
Qty: 21 CAPSULE | Refills: 0 | Status: SHIPPED | OUTPATIENT
Start: 2025-03-25 | End: 2025-04-01

## 2025-03-25 RX ORDER — DULAGLUTIDE 1.5 MG/.5ML
1.5 INJECTION, SOLUTION SUBCUTANEOUS WEEKLY
Qty: 4 ML | Refills: 0 | Status: SHIPPED | OUTPATIENT
Start: 2025-03-25

## 2025-03-25 RX ORDER — CHLORHEXIDINE GLUCONATE 40 MG/ML
SOLUTION TOPICAL DAILY PRN
OUTPATIENT
Start: 2025-03-25

## 2025-03-25 RX ORDER — LOSARTAN POTASSIUM AND HYDROCHLOROTHIAZIDE 25; 100 MG/1; MG/1
1 TABLET ORAL DAILY
Qty: 30 TABLET | Refills: 5 | Status: SHIPPED | OUTPATIENT
Start: 2025-03-25

## 2025-03-25 NOTE — PROGRESS NOTES
ASSESSMENT/PLAN:    Diagnoses and all orders for this visit:    Pre-operative laboratory examination  -     CBC and differential; Future    Traumatic rupture of supraspinatus tendon of right shoulder, initial encounter  -     Ambulatory Referral to Orthopedic Surgery  -     Case request operating room: ARTHROSCOPY SHOULDER WITH POSSIBLE ROTATOR CUFF REPAIR; Standing  -     Arc 2.0  -     Ambulatory Referral to Physical Therapy; Future  -     Ambulatory Referral to Occupational Therapy; Future  -     Case request operating room: ARTHROSCOPY SHOULDER WITH POSSIBLE ROTATOR CUFF REPAIR    Other orders  -     Nursing Communication Warmimg Interventions Implemented; Standing  -     Nursing Communication CHG bath, have staff wash entire body (neck down) per pre-op bathing protocol. Routine, evening prior to, and day of surgery.; Standing  -     Nursing Communication Swab both nares with Povidone-Iodine solution, EXCLUDE if patient has shellfish/Iodine allergy, and replace with nasal alcohol swabstick. Routine, day of surgery, on call to OR; Standing  -     chlorhexidine (PERIDEX) 0.12 % oral rinse 15 mL  -     Void on call to OR; Standing  -     Insert peripheral IV; Standing  -     chlorhexidine gluconate (HIBICLENS) 4 % topical liquid  -     ceFAZolin (ANCEF) 3,000 mg in dextrose 5% 100 ml IVPB        MRI arthrogram was consistent with a complete tear of his right rotator cuff.  The patient states that his symptoms are continuing to worsen and starting to affect his quality of life.  After exhausting conservative treatment, the risks and benefits of surgery were discussed with the patient.  He decided on an elective right shoulder arthroscopy with possible rotator cuff repair.  The patient surgery is tentatively scheduled for May 7, 2025.    Return for surgery.    She sustained a rotator cuff tear of his right shoulder late January.  He had an MRI arthrogram on March 17 which from that.  Would recommend repairing the  rotator cuff prior to removal of the mass in the hand.  The patient is in complete agreement.  All risk, complications, and benefits were discussed with the patient in great detail including bleeding, infection, blood clots, pain, stiffness, neurovascular damage, fractures, dislocations, the possibility loss of life and surgery, heart attack, stroke, wound problems, rerupture tendon, inability to repair the tendon, etc.  His surgery is scheduled for May 7, 2025.  The hand case will be postponed for now      _____________________________________________________  CHIEF COMPLAINT:  Chief Complaint   Patient presents with    Right Shoulder - Pain         SUBJECTIVE:  Vu Mcdaniels is a 54 y.o. male who presents to our office complaining of right shoulder pain with decreased range of motion and strength.  The patient initially injured his shoulder when he slipped on ice.  He denies any numbness or tingling.  He denies any fever or chills.  Previously saw Dr. Dillard, who ordered an MRI arthrogram of his right shoulder.    The following portions of the patient's history were reviewed and updated as appropriate: allergies, current medications, past family history, past medical history, past social history, past surgical history and problem list.    PAST MEDICAL HISTORY:  Past Medical History:   Diagnosis Date    Hypercholesterolemia     Hypertension     Prediabetes        PAST SURGICAL HISTORY:  Past Surgical History:   Procedure Laterality Date    FL INJECTION RIGHT SHOULDER (ARTHROGRAM)  3/17/2025    HERNIA REPAIR Bilateral     REPAIR LABRUM Right        FAMILY HISTORY:  Family History   Problem Relation Age of Onset    Graves' disease Mother        SOCIAL HISTORY:  Social History     Tobacco Use    Smoking status: Some Days     Types: Cigars     Passive exposure: Never    Smokeless tobacco: Never    Tobacco comments:     Smokes cigar on occasion   Vaping Use    Vaping status: Never Used   Substance Use Topics     Alcohol use: Yes    Drug use: Never       MEDICATIONS:    Current Outpatient Medications:     Alcohol Swabs (Alcohol Prep Pads) 70 % PADS, CHECK BLOOD SUGARS TWICE DAILY., Disp: 200 each, Rfl: 0    atorvastatin (LIPITOR) 40 mg tablet, TAKE 1 TABLET (40 MG TOTAL) BY MOUTH DAILY, Disp: 100 tablet, Rfl: 1    Blood Glucose Monitoring Suppl (OneTouch Verio Reflect) w/Device KIT, Check blood sugars twice daily. Please substitute with appropriate alternative as covered by patient's insurance. Dx: E11.65, Disp: 1 kit, Rfl: 0    cephalexin (KEFLEX) 500 mg capsule, Take 1 capsule (500 mg total) by mouth every 8 (eight) hours for 7 days, Disp: 21 capsule, Rfl: 0    Dulaglutide (Trulicity) 1.5 MG/0.5ML SOAJ, Inject 1.5 mg under the skin once a week, Disp: 4 mL, Rfl: 0    glucose blood (OneTouch Verio) test strip, Check blood sugars twice daily. Please substitute with appropriate alternative as covered by patient's insurance. Dx: E11.65, Disp: 200 each, Rfl: 3    losartan-hydrochlorothiazide (HYZAAR) 100-25 MG per tablet, Take 1 tablet by mouth daily, Disp: 30 tablet, Rfl: 5    metFORMIN (GLUCOPHAGE-XR) 500 mg 24 hr tablet, Take 2 tablets (1,000 mg total) by mouth daily with dinner, Disp: 60 tablet, Rfl: 2    OneTouch Delica Lancets 33G MISC, Check blood sugars twice daily. Please substitute with appropriate alternative as covered by patient's insurance. Dx: E11.65, Disp: 200 each, Rfl: 3    ALLERGIES:  Allergies   Allergen Reactions    Oxycodone-Acetaminophen Syncope     feint  Pt passes out         ROS:  Review of Systems     Constitutional: Negative for fatigue, fever or loss of appetite.   HENT: Negative.    Respiratory: Negative for shortness of breath, dyspnea.    Cardiovascular: Negative for chest pain/tightness.   Gastrointestinal: Negative for abdominal pain, N/V.   Endocrine: Negative for cold/heat intolerance, unexplained weight loss/gain.   Genitourinary: Negative for flank pain, dysuria, hematuria.  "  Musculoskeletal: Positive for arthralgia   Skin: Negative for rash.    Neurological: Negative for numbness or tingling  Psychiatric/Behavioral: Negative for agitation.  _____________________________________________________  PHYSICAL EXAMINATION:    Height 5' 7\" (1.702 m), weight 124 kg (274 lb).    Constitutional: Oriented to person, place, and time. Appears well-developed and well-nourished. No distress.   HENT:   Head: Normocephalic.   Eyes: Conjunctivae are normal. Right eye exhibits no discharge. Left eye exhibits no discharge. No scleral icterus.   Cardiovascular: Normal rate.    Pulmonary/Chest: Effort normal.   Neurological: Alert and oriented to person, place, and time.   Skin: Skin is warm and dry. No rash noted. Not diaphoretic. No erythema. No pallor.   Psychiatric: Normal mood and affect. Behavior is normal. Judgment and thought content normal.      MUSCULOSKELETAL EXAMINATION:   Physical Exam  Ortho Exam    Right upper extremity is neurovascularly intact  Fingers are pink and mobile  Compartments are soft  Range of motion of the shoulder is from 0 to 110 degrees of forward flexion and abduction  Rotator cuff strength 3 and half out of 5  Brisk cap refill  Sensation intact  Objective:  BP Readings from Last 1 Encounters:   03/25/25 122/84      Wt Readings from Last 1 Encounters:   03/25/25 124 kg (274 lb)        BMI:   Estimated body mass index is 42.91 kg/m² as calculated from the following:    Height as of this encounter: 5' 7\" (1.702 m).    Weight as of this encounter: 124 kg (274 lb).      Scribe Attestation      I,:  Clive Solis PA-C am acting as a scribe while in the presence of the attending physician.:       I,:  Leonid Walden, DO personally performed the services described in this documentation    as scribed in my presence.:            "

## 2025-03-25 NOTE — ASSESSMENT & PLAN NOTE
Vitals reviewed including trends  BP remains controlled at this time  Patient reports no side effects from current medication regiment  Plan to continue current medication and dosing   Discussed lifestyle modifications that could be beneficial including low salt diet, increase physical activity and weight lose   Continue with interval labs as indicated     Orders:    losartan-hydrochlorothiazide (HYZAAR) 100-25 MG per tablet; Take 1 tablet by mouth daily

## 2025-03-25 NOTE — PROGRESS NOTES
Name: Vu Mcdaniels      : 1970      MRN: [id number]  Encounter Provider: Markos Yousif MD  Encounter Date: 3/25/2025   Encounter department: [facility]IMARY [facility]  :  Assessment & Plan  Type 2 diabetes mellitus with other specified complication, without long-term current use of insulin (HCC)    Lab Results   Component Value Date    HGBA1C 7.1 (A) 2025     Recent glucose and Hba1c labs reviewed  Hba1c slightly increasing goal <7  Patient reports no side effects from current medications  Discussed lifestyle modifications that could be beneficial including increasing exercise, weight lose, and dietary changes  Discussed the importance of foot care, interval eye exams as well as short and long term sequela of Diabetes     Will increase trulicity         Orders:    POCT hemoglobin A1c    Albumin / creatinine urine ratio; Standing    Basic metabolic panel; Standing    Hemoglobin A1C; Standing    Dulaglutide (Trulicity) 1.5 MG/0.5ML SOAJ; Inject 1.5 mg under the skin once a week    Hypertension, unspecified type  Vitals reviewed including trends  BP remains controlled at this time  Patient reports no side effects from current medication regiment  Plan to continue current medication and dosing   Discussed lifestyle modifications that could be beneficial including low salt diet, increase physical activity and weight lose   Continue with interval labs as indicated     Orders:    losartan-hydrochlorothiazide (HYZAAR) 100-25 MG per tablet; Take 1 tablet by mouth daily    Right hand pain  MRI reviewed likely lipoma  Plans for excision with ortho in may          Hyperlipidemia, unspecified hyperlipidemia type  Recent Lipid Levels reviewed   Patient reports no side effects from current medications  Plan to continue current medication and dosing   Discussed lifestyle modifications that could be beneficial including increasing exercise, weight lose, and dietary changes  Continue with interval labs  "as indicated       Orders:    Lipid panel; Future    Paronychia of great toe of right foot    Orders:    cephalexin (KEFLEX) 500 mg capsule; Take 1 capsule (500 mg total) by mouth every 8 (eight) hours for 7 days           History of Present Illness   Diabetes  Pertinent negatives for hypoglycemia include no confusion, dizziness, headaches, pallor or seizures. Pertinent negatives for diabetes include no chest pain, no fatigue, no polydipsia and no weakness. Pertinent negatives for hypoglycemia complications include no blackouts, no hospitalization, no nocturnal hypoglycemia, no required assistance and no required glucagon injection. An ACE inhibitor/angiotensin II receptor blocker is being taken. He does not see a podiatrist.Eye exam is current.   Hypertension  This is a chronic problem. The problem is unchanged. The problem is controlled. Pertinent negatives include no anxiety, chest pain, headaches or shortness of breath. Risk factors for coronary artery disease include diabetes mellitus, male gender, smoking/tobacco exposure and dyslipidemia. The current treatment provides significant improvement. There are no compliance problems.        This is a 54 y.o. male who presents to the office for routine follow-up of chronic medical conditions.  Please see above \"THAIS\" documentation for additional information      Review of Systems   Constitutional:  Negative for activity change, appetite change, chills, fatigue and fever.   HENT:  Negative for congestion, dental problem, drooling, ear discharge, ear pain, facial swelling, postnasal drip, rhinorrhea and sinus pain.    Eyes:  Negative for photophobia, pain, discharge and itching.   Respiratory:  Negative for apnea, cough, chest tightness and shortness of breath.    Cardiovascular:  Negative for chest pain and leg swelling.   Gastrointestinal:  Negative for abdominal distention, abdominal pain, anal bleeding, constipation, diarrhea and nausea.   Endocrine: Negative for " "cold intolerance, heat intolerance and polydipsia.   Genitourinary:  Negative for difficulty urinating.   Musculoskeletal:  Negative for arthralgias, gait problem, joint swelling and myalgias.   Skin:  Negative for color change and pallor.   Allergic/Immunologic: Negative for immunocompromised state.   Neurological:  Negative for dizziness, seizures, facial asymmetry, weakness, light-headedness, numbness and headaches.   Psychiatric/Behavioral:  Negative for agitation, behavioral problems, confusion, decreased concentration and dysphoric mood.    All other systems reviewed and are negative.      Objective   /84 (BP Location: Left arm, Patient Position: Sitting, Cuff Size: Large)   Pulse 64   Temp 98.3 °F (36.8 °C) (Tympanic)   Ht 5' 7\" (1.702 m)   Wt 124 kg (274 lb)   SpO2 98%   BMI 42.91 kg/m²      Physical Exam  Constitutional:       Appearance: He is well-developed.   HENT:      Head: Normocephalic.   Eyes:      Pupils: Pupils are equal, round, and reactive to light.   Cardiovascular:      Rate and Rhythm: Normal rate and regular rhythm.      Pulses: no weak pulses.           Dorsalis pedis pulses are 2+ on the right side and 2+ on the left side.        Posterior tibial pulses are 2+ on the right side and 2+ on the left side.   Pulmonary:      Effort: Pulmonary effort is normal.      Breath sounds: Normal breath sounds.   Abdominal:      General: Bowel sounds are normal.      Palpations: Abdomen is soft.   Musculoskeletal:         General: Normal range of motion.      Cervical back: Normal range of motion and neck supple.      Comments: Right hand mass    Paronychia of the lateral nail fold of the right first digit   Feet:      Right foot:      Skin integrity: No ulcer, skin breakdown, erythema, warmth, callus or dry skin.      Left foot:      Skin integrity: No ulcer, skin breakdown, erythema, warmth, callus or dry skin.   Skin:     General: Skin is warm.     Patient's shoes and socks " removed.    Right Foot/Ankle   Right Foot Inspection  Skin Exam: skin normal and skin intact. No dry skin, no warmth, no callus, no erythema, no maceration, no abnormal color, no pre-ulcer, no ulcer and no callus.     Toe Exam: No swelling, no tenderness, erythema and  no right toe deformity    Sensory   Vibration: intact  Proprioception: intact  Monofilament testing: intact    Vascular  Capillary refills: < 3 seconds  The right DP pulse is 2+. The right PT pulse is 2+.     Left Foot/Ankle  Left Foot Inspection  Skin Exam: skin normal and skin intact. No dry skin, no warmth, no erythema, no maceration, normal color, no pre-ulcer, no ulcer and no callus.     Sensory   Vibration: intact  Proprioception: intact  Monofilament testing: intact    Vascular  Capillary refills: < 3 seconds  The left DP pulse is 2+. The left PT pulse is 2+.     Assign Risk Category  No deformity present  No loss of protective sensation  No weak pulses  Risk: 0

## 2025-03-25 NOTE — ASSESSMENT & PLAN NOTE
Lab Results   Component Value Date    HGBA1C 7.1 (A) 03/25/2025     Recent glucose and Hba1c labs reviewed  Hba1c slightly increasing goal <7  Patient reports no side effects from current medications  Discussed lifestyle modifications that could be beneficial including increasing exercise, weight lose, and dietary changes  Discussed the importance of foot care, interval eye exams as well as short and long term sequela of Diabetes     Will increase trulicity         Orders:    POCT hemoglobin A1c    Albumin / creatinine urine ratio; Standing    Basic metabolic panel; Standing    Hemoglobin A1C; Standing    Dulaglutide (Trulicity) 1.5 MG/0.5ML SOAJ; Inject 1.5 mg under the skin once a week

## 2025-03-25 NOTE — ASSESSMENT & PLAN NOTE
Recent Lipid Levels reviewed   Patient reports no side effects from current medications  Plan to continue current medication and dosing   Discussed lifestyle modifications that could be beneficial including increasing exercise, weight lose, and dietary changes  Continue with interval labs as indicated       Orders:    Lipid panel; Future

## 2025-03-27 RX ORDER — SODIUM CHLORIDE, SODIUM LACTATE, POTASSIUM CHLORIDE, CALCIUM CHLORIDE 600; 310; 30; 20 MG/100ML; MG/100ML; MG/100ML; MG/100ML
125 INJECTION, SOLUTION INTRAVENOUS CONTINUOUS
Status: CANCELLED | OUTPATIENT
Start: 2025-03-27

## 2025-03-27 RX ORDER — LIDOCAINE HYDROCHLORIDE 10 MG/ML
0.5 INJECTION, SOLUTION EPIDURAL; INFILTRATION; INTRACAUDAL; PERINEURAL ONCE AS NEEDED
Status: CANCELLED | OUTPATIENT
Start: 2025-03-27

## 2025-03-27 NOTE — TELEPHONE ENCOUNTER
Scheduled date of colonoscopy (as of today):3/31/25  Physician performing colonoscopy:Dr. Ortiz  Location of colonoscopy:[facility]  Bowel prep reviewed with patient:confirmed hannah/dul prep instructions, pt has prep instructions already  Instructions reviewed with patient by:paco  Clearances: pt did not take Trulicity aware still needs to be held until after the colonoscopy    Pt calling to r/s the cancelled colonoscopy.  It was cancelled today, due to the patient eating.

## 2025-03-31 ENCOUNTER — HOSPITAL ENCOUNTER (OUTPATIENT)
Dept: GASTROENTEROLOGY | Facility: HOSPITAL | Age: 55
Setting detail: OUTPATIENT SURGERY
Discharge: HOME | End: 2025-03-31
Attending: INTERNAL MEDICINE
Payer: COMMERCIAL

## 2025-03-31 ENCOUNTER — ANESTHESIA (OUTPATIENT)
Dept: GASTROENTEROLOGY | Facility: HOSPITAL | Age: 55
End: 2025-03-31
Payer: COMMERCIAL

## 2025-03-31 ENCOUNTER — ANESTHESIA EVENT (OUTPATIENT)
Dept: GASTROENTEROLOGY | Facility: HOSPITAL | Age: 55
End: 2025-03-31
Payer: COMMERCIAL

## 2025-03-31 ENCOUNTER — PREP FOR PROCEDURE (OUTPATIENT)
Dept: OBGYN CLINIC | Facility: CLINIC | Age: 55
End: 2025-03-31

## 2025-03-31 VITALS
SYSTOLIC BLOOD PRESSURE: 128 MMHG | OXYGEN SATURATION: 98 % | TEMPERATURE: 97.2 F | HEART RATE: 78 BPM | WEIGHT: 274 LBS | BODY MASS INDEX: 43 KG/M2 | RESPIRATION RATE: 18 BRPM | DIASTOLIC BLOOD PRESSURE: 74 MMHG | HEIGHT: 67 IN

## 2025-03-31 DIAGNOSIS — R19.5 POSITIVE COLORECTAL CANCER SCREENING USING COLOGUARD TEST: ICD-10-CM

## 2025-03-31 LAB — GLUCOSE SERPL-MCNC: 165 MG/DL (ref 65–140)

## 2025-03-31 PROCEDURE — 88305 TISSUE EXAM BY PATHOLOGIST: CPT | Performed by: PATHOLOGY

## 2025-03-31 PROCEDURE — 82948 REAGENT STRIP/BLOOD GLUCOSE: CPT

## 2025-03-31 RX ORDER — ASPIRIN 325 MG
325 TABLET ORAL DAILY
COMMUNITY

## 2025-03-31 RX ORDER — LIDOCAINE HYDROCHLORIDE 20 MG/ML
INJECTION, SOLUTION EPIDURAL; INFILTRATION; INTRACAUDAL; PERINEURAL AS NEEDED
Status: DISCONTINUED | OUTPATIENT
Start: 2025-03-31 | End: 2025-03-31

## 2025-03-31 RX ORDER — PROPOFOL 10 MG/ML
INJECTION, EMULSION INTRAVENOUS AS NEEDED
Status: DISCONTINUED | OUTPATIENT
Start: 2025-03-31 | End: 2025-03-31

## 2025-03-31 RX ORDER — SODIUM CHLORIDE, SODIUM LACTATE, POTASSIUM CHLORIDE, CALCIUM CHLORIDE 600; 310; 30; 20 MG/100ML; MG/100ML; MG/100ML; MG/100ML
INJECTION, SOLUTION INTRAVENOUS CONTINUOUS PRN
Status: DISCONTINUED | OUTPATIENT
Start: 2025-03-31 | End: 2025-03-31

## 2025-03-31 RX ADMIN — PROPOFOL 30 MG: 10 INJECTION, EMULSION INTRAVENOUS at 07:37

## 2025-03-31 RX ADMIN — PROPOFOL 30 MG: 10 INJECTION, EMULSION INTRAVENOUS at 07:58

## 2025-03-31 RX ADMIN — PROPOFOL 100 MG: 10 INJECTION, EMULSION INTRAVENOUS at 07:33

## 2025-03-31 RX ADMIN — PROPOFOL 130 MCG/KG/MIN: 10 INJECTION, EMULSION INTRAVENOUS at 07:34

## 2025-03-31 RX ADMIN — LIDOCAINE HYDROCHLORIDE 60 MG: 20 INJECTION, SOLUTION EPIDURAL; INFILTRATION; INTRACAUDAL at 07:33

## 2025-03-31 RX ADMIN — SODIUM CHLORIDE, SODIUM LACTATE, POTASSIUM CHLORIDE, AND CALCIUM CHLORIDE: .6; .31; .03; .02 INJECTION, SOLUTION INTRAVENOUS at 06:39

## 2025-03-31 NOTE — ANESTHESIA PREPROCEDURE EVALUATION
Procedure:  COLONOSCOPY    Relevant Problems   CARDIO   (+) Cardiac murmur   (+) Hyperlipemia   (+) Hypertension      ENDO   (+) Type 2 diabetes mellitus with other specified complication (HCC)      PULMONARY   (+) ESTEPHANIA (obstructive sleep apnea)      Ear/Nose/Throat   (+) Mixed hearing loss, bilateral      Orthopedic/Musculoskeletal   (+) Right hand pain      Other   (+) Elevated BP without diagnosis of hypertension   (+) Encounter for immunization   (+) Morbid obesity (HCC)      Lab Results   Component Value Date    SODIUM 139 12/31/2024    K 3.9 12/31/2024     12/31/2024    CO2 28 12/31/2024    AGAP 7 12/31/2024    BUN 18 12/31/2024    CREATININE 0.95 12/31/2024    GLUC 112 12/31/2024    GLUF 186 (H) 06/28/2024    CALCIUM 9.5 12/31/2024    AST 17 06/28/2024    ALT 33 06/28/2024    ALKPHOS 97 06/28/2024    TP 7.1 06/28/2024    TBILI 0.51 06/28/2024    EGFR 89 12/31/2024     Lab Results   Component Value Date    WBC 9.57 12/31/2024    HGB 15.3 12/31/2024    HCT 45.0 12/31/2024    MCV 90 12/31/2024     12/31/2024           Physical Exam    Airway    Mallampati score: II  TM Distance: >3 FB  Neck ROM: full     Dental       Cardiovascular      Pulmonary      Other Findings        Anesthesia Plan  ASA Score- 3     Anesthesia Type- IV sedation with anesthesia with ASA Monitors.         Additional Monitors:     Airway Plan:            Plan Factors-Exercise tolerance (METS): >4 METS.    Chart reviewed. EKG reviewed. Imaging results reviewed. Existing labs reviewed. Patient summary reviewed.                  Induction- intravenous.    Postoperative Plan-         Informed Consent- Anesthetic plan and risks discussed with patient.  I personally reviewed this patient with the CRNA. Discussed and agreed on the Anesthesia Plan with the CRNA..      NPO Status:  Vitals Value Taken Time   Date of last liquid 03/30/25 03/31/25 0648   Time of last liquid 2200 03/31/25 0648   Date of last solid 03/29/25 03/31/25 0648    Time of last solid 2300 03/31/25 0600

## 2025-03-31 NOTE — ANESTHESIA POSTPROCEDURE EVALUATION
Post-Op Assessment Note    CV Status:  Stable  Pain Score: 0    Pain management: adequate       Mental Status:  Alert   Hydration Status:  Stable   PONV Controlled:  None   Airway Patency:  Patent     Post Op Vitals Reviewed: Yes    No anethesia notable event occurred.    Staff: CRNA           Last Filed PACU Vitals:  Vitals Value Taken Time   Temp 97.2    Pulse 87    /56    Resp 16    SpO2 97%

## 2025-03-31 NOTE — DISCHARGE INSTRUCTIONS
.Colonoscopy   WHAT YOU NEED TO KNOW:   A colonoscopy is a procedure to examine the inside of your colon (intestine) with a scope. Polyps or tissue growths may have been removed during your colonoscopy. It is normal to feel bloated and to have some abdominal discomfort. You should be passing gas. If you have hemorrhoids or you had polyps removed, you may have a small amount of bleeding.        DISCHARGE INSTRUCTIONS:   Seek care immediately if:   You have sudden, severe abdominal pain.     You have problems swallowing.     You have a large amount of black, sticky bowel movements or blood in your bowel movements.     You have sudden trouble breathing.     You feel weak, lightheaded, or faint or your heart beats faster than normal for you.     Contact your healthcare provider if:   You have a fever and chills.      You have nausea or are vomiting.      Your abdomen is bloated or feels full and hard.     You have abdominal pain.   You have black, sticky bowel movements or blood in your bowel movements.  You have not had a bowel movement for 3 days after your procedure.  You have rash or hives.  You have questions or concerns about your procedure.    Activity:   Do not lift, strain, or run for 24 hours after your procedure.     Rest after your procedure. You have been given medicine to relax you. Do not drive or make important decisions until the day after your procedure. Return to your normal activity as directed.     Relieve gas and discomfort from bloating by lying on your right side with a heating pad on your abdomen. You may need to take short walks to help the gas move out. Eat small meals until bloating is relieved.  Follow up with your healthcare provider as directed: Write down your questions so you remember to ask them during your visits.     If you take a “blood thinner”, please review the specific instructions from your endoscopist about when you should resume it. These can be found in the “Recommendation”  and “Your Medication list” sections of this After Visit Summary.

## 2025-03-31 NOTE — H&P
"History and Physical -  [facility]  Vu Mcdaniels 54 y.o. male MRN: [id number]                  HPI: Vu Mcdaniels is a 54 y.o. year old male who presents for colonoscopy      REVIEW OF SYSTEMS: Per the HPI, and otherwise unremarkable.    Historical Information   Past Medical History:   Diagnosis Date    Hypercholesterolemia     Hypertension     Prediabetes      Past Surgical History:   Procedure Laterality Date    FL INJECTION RIGHT SHOULDER (ARTHROGRAM)  3/17/2025    HERNIA REPAIR Bilateral     REPAIR LABRUM Right      Social History   Social History     Substance and Sexual Activity   Alcohol Use Yes    Comment: social     Social History     Substance and Sexual Activity   Drug Use Never     Social History     Tobacco Use   Smoking Status Some Days    Types: Cigars    Passive exposure: Never   Smokeless Tobacco Never   Tobacco Comments    Smokes cigar on occasion     Family History   Problem Relation Age of Onset    Graves' disease Mother        Meds/Allergies       Current Outpatient Medications:     aspirin 325 mg tablet    atorvastatin (LIPITOR) 40 mg tablet    cephalexin (KEFLEX) 500 mg capsule    losartan-hydrochlorothiazide (HYZAAR) 100-25 MG per tablet    metFORMIN (GLUCOPHAGE-XR) 500 mg 24 hr tablet    Alcohol Swabs (Alcohol Prep Pads) 70 % PADS    Blood Glucose Monitoring Suppl (OneTouch Verio Reflect) w/Device KIT    Dulaglutide (Trulicity) 1.5 MG/0.5ML SOAJ    glucose blood (OneTouch Verio) test strip    OneTouch Delica Lancets 33G MISC  No current facility-administered medications for this encounter.    Facility-Administered Medications Ordered in Other Encounters:     lactated ringers infusion, , Intravenous, Continuous PRN, New Bag at 03/31/25 0639    Allergies   Allergen Reactions    Oxycodone-Acetaminophen Syncope     feint  Pt passes out         Objective     BP (!) 182/89   Pulse 90   Temp 97.6 °F (36.4 °C) (Temporal)   Resp 18   Ht 5' 7\" (1.702 m)   Wt 124 kg " (274 lb)   SpO2 99%   BMI 42.91 kg/m²       PHYSICAL EXAM    Gen: NAD  Head: NCAT  CV: RRR  CHEST: Clear  ABD: soft, NT/ND  EXT: no edema      ASSESSMENT/PLAN:  This is a 54 y.o. year old male here for colonoscopy, and he is stable and optimized for his procedure.

## 2025-04-02 ENCOUNTER — EVALUATION (OUTPATIENT)
Dept: PHYSICAL THERAPY | Facility: CLINIC | Age: 55
End: 2025-04-02
Payer: COMMERCIAL

## 2025-04-02 DIAGNOSIS — S46.811A TRAUMATIC RUPTURE OF SUPRASPINATUS TENDON OF RIGHT SHOULDER, INITIAL ENCOUNTER: ICD-10-CM

## 2025-04-02 DIAGNOSIS — M25.511 CHRONIC RIGHT SHOULDER PAIN: Primary | ICD-10-CM

## 2025-04-02 DIAGNOSIS — G89.29 CHRONIC RIGHT SHOULDER PAIN: Primary | ICD-10-CM

## 2025-04-02 PROCEDURE — 97140 MANUAL THERAPY 1/> REGIONS: CPT | Performed by: PHYSICAL THERAPIST

## 2025-04-02 PROCEDURE — 97110 THERAPEUTIC EXERCISES: CPT | Performed by: PHYSICAL THERAPIST

## 2025-04-02 PROCEDURE — 97112 NEUROMUSCULAR REEDUCATION: CPT | Performed by: PHYSICAL THERAPIST

## 2025-04-02 NOTE — PROGRESS NOTES
PT Progress Note     Today's date: 2025  Patient name: Vu Mcdaniels  : 1970  MRN: [id number]  Referring provider: Markos Yousif MD  Dx:   Encounter Diagnosis     ICD-10-CM    1. Chronic right shoulder pain  M25.511     G89.29            Assessment  Impairments: abnormal coordination, abnormal muscle firing, abnormal or restricted ROM, activity intolerance, impaired physical strength, lacks appropriate home exercise program, pain with function and safety issue  Functional limitations: Difficulty with lifting at and above shoulder height.  Symptom irritability: moderate    Assessment details: Vu Mcdaniels is a 54 y.o. male who presents to outpatient PT with a  Chronic right shoulder pain  (primary encounter diagnosis).  No further referral appears necessary at this time based upon examination results. Exam findings consistent with rotator  cuff/ labral pathology. Pt presents with decreased strength, ROM, balance, functional activity tolerance, and pain with movement in his right shoulder  which is  limiting his ability to perform the aforementioned functional activities.  Etiologic factors include repetitive poor body mechanics. Prognosis is good given HEP compliance and PT 2-3/wk.  Please contact me if you have any questions or recommendations.  Thank you for the opportunity to share in  [facility].     RA 3/3    Vu Mcdaniels has demonstrated decreased pain, increased strength, increased range of motion, and increased activity tolerance since starting physical therapy services. Minimal improvements in the right shoulder since starting skilled PT. Pt is scheduled for MRI on 3/17. Will continue conservative PT until MRI. Pt in agreement with the plan. Weakness and decreased ROM Primarily into right shoulder abduction   They continue to present with pain, decreased strength, decreased range of motion,and decreased activity tolerance and would benefit from additional skilled physical therapy  interventions to address impairments and maximize function.    RA 4/2    Vu Mcdaniels has demonstrated decreased pain, increased strength, increased range of motion, and increased activity tolerance since starting physical therapy services. Minimal improvements in the right shoulder since starting skilled PT. MRI shows right rotator cuff tear. Pt is currently scheduled for right rotator cuff repair DOS 4/30 Dr. Walden Will continue skilled PT until 4/30 to preserve ROM.   Weakness and decreased ROM Primarily into right shoulder abduction  continues   He continue to present with pain, decreased strength, decreased range of motion,and decreased activity tolerance and would benefit from additional skilled physical therapy interventions to address impairments and maximize function.      Understanding of Dx/Px/POC: good     Prognosis: good    Goals  STG to be achieved in 4 weeks     1. Pt will reduce subjective pain rating by at least 50 percent the help facilitate return to PLOF  Progressing   2. Pt will improve right shoulder PROM/ AROMby at least 10 degrees to help promote improved functional activity tolerance   Progressing   3. Pt will improve right shoulder MMT scores by at least 1/2 grade to promote improved functional activity tolerance   Progressing          LTG to be achieved in 6-8 weeks  1. Pt will be complaint with HEP  Progressing   2. Pt will improve Right shoulder AROM to WFL, to help facilitate independence with ADL's, IADL's, and functional activities   Progressing   3. Pt will improve right shoulder Strength to WFL to help facilitate independence with ADL's, IADL's, and functional activities   Progressing   4. Pt will have no limitations with ambulation to help facilitate independence at home and in the community.   Progressing   5. Pt will have no limitations with stair negotiation to help facilitate independence at home and in the community   Progressing           Plan  Patient would benefit from:  skilled physical therapy    Planned therapy interventions: ADL training, balance, balance/weight bearing training, flexibility, functional ROM exercises, gait training, graded activity, graded exercise, graded motor, home exercise program, joint mobilization, manual therapy, neuromuscular re-education, patient education, postural training, strengthening, stretching, therapeutic activities and therapeutic exercise    Frequency: 2x week  Duration in weeks: 12  Plan of Care beginning date: 2025  Plan of Care expiration date: 2025  Treatment plan discussed with: patient, PTA and referring physician        Subjective Evaluation    History of Present Illness  Mechanism of injury: 54 year old male who presents to outpatient PT with CC of right shoulder pain since . The patient reports he slipped and fell outside on [place] night, He fell on his left side, but reports increased pain in his right shoulder today. He reports localized pain to the lateral aspect of the right shoulder, reports pain in the shoulder feels like a pulling deep pain. Denies radicular sx. Pain is exacerbated with reaching into Hz adduction. He does report occasional clicking/ popping in the shoulder. Goals: to gain more function in the shoulder.   Patient Goals  Patient goals for therapy: increased strength, decreased pain and increased motion  Patient goal: to gain more motion in the shoulder  Pain    RA   RA 4/2   Current pain ratin  8  4  At best pain ratin  5  4  At worst pain ratin  8  6  Quality: pressure  Aggravating factors: lifting  Progression: worsening        Objective     Active Range of Motion     Right Shoulder           RA 3/3  RA 4/  Flexion: 140 degrees           145   160   Abduction: 90 degrees          100  105   External rotation BTH: Active external rotation behind the head: occiput.    Occiput  C2  Internal rotation BTB: Active internal rotation behind the back: Sacrum.    L5  "  L5    Strength/Myotome Testing     Right Shoulder        RA  RA 4/2   Planes of Motion   Flexion: 3-     3- 3/5  Abduction: 2     2 2/5  External rotation at 0°: 2   2 2/5  Internal rotation at 0°: 2   2 2/5    Tests     Right Shoulder   Positive active compression ([place]) and empty can.                  POC Expires Reeval for Medicare to be completed Unit LImit Auth Expiration Date PT/OT/STVisit Limit   4/29 By visit  10       Completed on                  VISIT TRACKER  DATE 1/2 2/3 2/7 2/10 2/12 2/17 2/20 2/24 2/27 4/2   Visit # 1 2 3 4 5 6 7 8 9 RE   Remaining 9 8 7 6 5 4 3 2 1           Precautions: HTN       Manuals 1/29 2/3 2/7 2/10 2/12 2/17 2/20 2/24 2/27 4/2   PROM to right shoulder  RR RR MM MJC RR MJC RR RR MJC RR                                          Neuro Re-Ed             MTP LTP  Grn 5''20x Grn 5\"x20 GTB  5\" x20 ea GTB  5\" x20 ea GTB  5\" x20 GTB  5\" x20 BTB  5\" x20 Blue   5\" x20                                                                                                                                                                                                   Green   5\" x20    TB ER IR   Green 5''20x Green 5\"x20 GTB  5\" x20 GTB 5''20x GTB  X20 IR;   ER GTB  x10   No resistnace x10 GTB  X20 IR;   ER GTB  x10   No resistnace x10 BTB  X20 IR;   ER BTB  x10   No resistnace x10 Blue TB  IR  X20     ER X10 Blue  X10 no resistance    Green  TB  IR  X20     ER X10 Blue  X10 no resistance   B/L TB ER              Supine Cane    Flex 10\"x10 5\" x10 5''10x 5\" x10 5\" x10 5\" x10 5\" x10 5\" x10   Compass             Supine ABC              UBE for postural correction  6/6 120  6/6 120 6'/6'  120 rpm 6/6 120 6'/6'  120 rpm 6'/6'  120 rpm 6'/6'  120 rpm 6'/6'  120 rpm 6'/6'  120 rpm                             Ther Ex             Pulley   5''20x flexion scaption  5''20x flexion scaption  5\" x20 flex/  scap 5\" x20 flex/  scap 5\"x20    Flex/  scap 5\"x20    Flex/  scap 5\"x20    Flex/  scap 5\" " "x20  Flex/scap 5\" x20  Flex/scap   Finger ladder   5''10x flex scap  5''10x flex scap  5\" x10 flex/scap 5\" x10 flex/scap 5\" x10  Flex/ scap 5\" x10  Flex/ scap 5\" x10  Flex/ scap 5\" x10  Flex/ scap 5\" x10  Flex/ scap   Upper trap stretch              Levator stretch                                                                  Ther Activity                                       Gait Training                                       Modalities             CP       X10 min  X10 min   X10 min                                    "

## 2025-04-03 PROCEDURE — 88305 TISSUE EXAM BY PATHOLOGIST: CPT | Performed by: PATHOLOGY

## 2025-04-04 ENCOUNTER — RESULTS FOLLOW-UP (OUTPATIENT)
Age: 55
End: 2025-04-04

## 2025-04-04 DIAGNOSIS — D12.6 TUBULAR ADENOMA OF COLON: Primary | ICD-10-CM

## 2025-04-07 ENCOUNTER — OFFICE VISIT (OUTPATIENT)
Dept: PHYSICAL THERAPY | Facility: CLINIC | Age: 55
End: 2025-04-07
Payer: COMMERCIAL

## 2025-04-07 DIAGNOSIS — S46.811A TRAUMATIC RUPTURE OF SUPRASPINATUS TENDON OF RIGHT SHOULDER, INITIAL ENCOUNTER: Primary | ICD-10-CM

## 2025-04-07 DIAGNOSIS — M25.511 CHRONIC RIGHT SHOULDER PAIN: ICD-10-CM

## 2025-04-07 DIAGNOSIS — G89.29 CHRONIC RIGHT SHOULDER PAIN: ICD-10-CM

## 2025-04-07 PROCEDURE — 97140 MANUAL THERAPY 1/> REGIONS: CPT

## 2025-04-07 PROCEDURE — 97112 NEUROMUSCULAR REEDUCATION: CPT

## 2025-04-07 PROCEDURE — 97110 THERAPEUTIC EXERCISES: CPT

## 2025-04-07 NOTE — PROGRESS NOTES
"Daily Note     Today's date: 2025  Patient name: Vu Mcdaniels  : 1970  MRN: [id number]  Referring provider: Clive Solis,*  Dx:   Encounter Diagnosis     ICD-10-CM    1. Traumatic rupture of supraspinatus tendon of right shoulder, initial encounter  S46.811A       2. Chronic right shoulder pain  M25.511     G89.29                      Subjective: Pt denies any adverse effects following last PT treatment. Awaiting R shoulder surgery.     Objective: See treatment diary below      Assessment: Tolerated treatment well.  Reported at end of treatment today that he had issues with high BP over the weekend.  /84 post treatment today. Patient demonstrated fatigue post treatment, exhibited good technique with therapeutic exercises, and would benefit from continued PT.       Plan: Continue per plan of care.        POC Expires Reeval for Medicare to be completed Unit LImit Auth Expiration Date PT/OT/STVisit Limit    By visit  10       Completed on                  VISIT TRACKER  DATE 4/7 2/3 2/7 2/10 2/12 2/17 2/20 2/24 2/27 4/2   Visit # 1 2 3 4 5 6 7 8 9 RE   Remaining 9 8 7 6 5 4 3 2 1           Precautions: HTN       Manuals 4/7 2/3 2/7 2/10 2/12 2/17 2/20 2/24 2/27 4/2   PROM to right shoulder  RR RR MM MJC RR MJC RR RR MJC RR                                          Neuro Re-Ed             MTP LTP GTB  5\" 2x10 Grn 5''20x Grn 5\"x20 GTB  5\" x20 ea GTB  5\" x20 ea GTB  5\" x20 GTB  5\" x20 BTB  5\" x20 Blue   5\" x20                                                                                                                                                                                                   Green   5\" x20    TB ER IR  GTB  2x10 ea Green 5''20x Green 5\"x20 GTB  5\" x20 GTB 5''20x GTB  X20 IR;   ER GTB  x10   No resistnace x10 GTB  X20 IR;   ER GTB  x10   No resistnace x10 BTB  X20 IR;   ER BTB  x10   No resistnace x10 Blue TB  IR  X20     ER X10 Blue  X10 no resistance    Green  " "TB  IR  X20     ER X10 Blue  X10 no resistance   B/L TB ER              Supine Cane  5\" x10   Flex 10\"x10 5\" x10 5''10x 5\" x10 5\" x10 5\" x10 5\" x10 5\" x10   Compass             Supine ABC              UBE for postural correction 5/5'  120 rpm   6/6 120  6/6 120 6'/6'  120 rpm 6/6 120 6'/6'  120 rpm 6'/6'  120 rpm 6'/6'  120 rpm 6'/6'  120 rpm 6'/6'  120 rpm                             Ther Ex             Pulley  5\" x20  Flex/scap 5''20x flexion scaption  5''20x flexion scaption  5\" x20 flex/  scap 5\" x20 flex/  scap 5\"x20    Flex/  scap 5\"x20    Flex/  scap 5\"x20    Flex/  scap 5\" x20  Flex/scap 5\" x20  Flex/scap   Finger ladder  5\" x20  Flex/scap 5''10x flex scap  5''10x flex scap  5\" x10 flex/scap 5\" x10 flex/scap 5\" x10  Flex/ scap 5\" x10  Flex/ scap 5\" x10  Flex/ scap 5\" x10  Flex/ scap 5\" x10  Flex/ scap   Upper trap stretch              Levator stretch                                                                  Ther Activity                                       Gait Training                                       Modalities             CP       X10 min  X10 min   X10 min                                "

## 2025-04-07 NOTE — TELEPHONE ENCOUNTER
----- Message from Jane Tello PA-C sent at 4/4/2025  2:28 PM EDT -----  Not sure exactly what bin this needs to go to. Patient had colonoscopy with Dr. Vergara at [place]. Needs repeat colon in 6 mos with Golytely prep. I wasn't sure if this message was sent to anyone.  ----- Message -----  From: Tera Foreman MD  Sent: 4/4/2025   2:20 PM EDT  To: Jane Tello PA-C    Majority of the polyps were tubular adenoma or SSA. Repeat colonoscopy with Golytely in 6 months. Given numerous precancerous polyps, refer to genetic counseling. [employer] message sent.

## 2025-04-08 DIAGNOSIS — E11.69 TYPE 2 DIABETES MELLITUS WITH OTHER SPECIFIED COMPLICATION, UNSPECIFIED WHETHER LONG TERM INSULIN USE (HCC): ICD-10-CM

## 2025-04-09 RX ORDER — ATORVASTATIN CALCIUM 40 MG/1
40 TABLET, FILM COATED ORAL DAILY
Qty: 100 TABLET | Refills: 1 | Status: SHIPPED | OUTPATIENT
Start: 2025-04-09

## 2025-04-10 ENCOUNTER — TELEPHONE (OUTPATIENT)
Dept: GASTROENTEROLOGY | Facility: CLINIC | Age: 55
End: 2025-04-10

## 2025-04-10 ENCOUNTER — OFFICE VISIT (OUTPATIENT)
Dept: PHYSICAL THERAPY | Facility: CLINIC | Age: 55
End: 2025-04-10
Payer: COMMERCIAL

## 2025-04-10 DIAGNOSIS — G89.29 CHRONIC RIGHT SHOULDER PAIN: ICD-10-CM

## 2025-04-10 DIAGNOSIS — M25.511 CHRONIC RIGHT SHOULDER PAIN: ICD-10-CM

## 2025-04-10 DIAGNOSIS — S46.811A TRAUMATIC RUPTURE OF SUPRASPINATUS TENDON OF RIGHT SHOULDER, INITIAL ENCOUNTER: Primary | ICD-10-CM

## 2025-04-10 PROCEDURE — 97112 NEUROMUSCULAR REEDUCATION: CPT

## 2025-04-10 PROCEDURE — 97140 MANUAL THERAPY 1/> REGIONS: CPT

## 2025-04-10 NOTE — PROGRESS NOTES
"Daily Note     Today's date: 4/10/2025  Patient name: Vu Mcdaniels  : 1970  MRN: [id number]  Referring provider: Clive Solis,*  Dx:   Encounter Diagnosis     ICD-10-CM    1. Traumatic rupture of supraspinatus tendon of right shoulder, initial encounter  S46.811A       2. Chronic right shoulder pain  M25.511     G89.29                      Subjective: Vu reports \"its there\" in reference to R shoulder stating that he \"blew it out.\"       Objective: See treatment diary below      Assessment: Visual and VC to ensure correct exercise technique. PROM to tolerance. Improvement in R shoulder ROM with both self and manual stretching following program. Good scap positioning with tband strengthening exercises. Pt would continue to benefit from skilled PT. Progress as able.       Plan: Continue with current POC to address pt deficits.        POC Expires Reeval for Medicare to be completed Unit LImit Auth Expiration Date PT/OT/STVisit Limit    By visit  10       Completed on                  VISIT TRACKER  DATE 4/7 4/10 2/7 2/10 2/12 2/17 2/20 2/24 2/27 4/2   Visit # 1 2 3 4 5 6 7 8 9 RE   Remaining 9 8 7 6 5 4 3 2 1           Precautions: HTN       Manuals 4/7 4/10 2/7 2/10 2/12 2/17 2/20 2/24 2/27 4/2   PROM to right shoulder  RR TB MM MJC RR MJC RR RR MJC RR                                          Neuro Re-Ed             MTP LTP GTB  5\" 2x10 GTB  5\" 2x10 ea  Grn 5\"x20 GTB  5\" x20 ea GTB  5\" x20 ea GTB  5\" x20 GTB  5\" x20 BTB  5\" x20 Blue   5\" x20                                                                                                                                                                                                   Green   5\" x20    TB ER IR  GTB  2x10 ea GTB 2x10 ea Green 5\"x20 GTB  5\" x20 GTB 5''20x GTB  X20 IR;   ER GTB  x10   No resistnace x10 GTB  X20 IR;   ER GTB  x10   No resistnace x10 BTB  X20 IR;   ER BTB  x10   No resistnace x10 Blue TB  IR  X20     ER X10 " "Blue  X10 no resistance    Green  TB  IR  X20     ER X10 Blue  X10 no resistance   B/L TB ER              Supine Cane  5\" x10  5\" x10 Flex 10\"x10 5\" x10 5''10x 5\" x10 5\" x10 5\" x10 5\" x10 5\" x10   Compass             Supine ABC              UBE for postural correction 5/5'  120 rpm   5/5'  120 rpm 6/6 120 6'/6'  120 rpm 6/6 120 6'/6'  120 rpm 6'/6'  120 rpm 6'/6'  120 rpm 6'/6'  120 rpm 6'/6'  120 rpm                             Ther Ex             [place]  5\" x20  Flex/scap 5\" x20  Flex/scap 5''20x flexion scaption  5\" x20 flex/  scap 5\" x20 flex/  scap 5\"x20    Flex/  scap 5\"x20    Flex/  scap 5\"x20    Flex/  scap 5\" x20  Flex/scap 5\" x20  Flex/scap   Finger ladder  5\" x20  Flex/scap 5\" x20  Flex/scap 5''10x flex scap  5\" x10 flex/scap 5\" x10 flex/scap 5\" x10  Flex/ scap 5\" x10  Flex/ scap 5\" x10  Flex/ scap 5\" x10  Flex/ scap 5\" x10  Flex/ scap   Upper trap stretch              Levator stretch                                                                  Ther Activity                                       Gait Training                                       Modalities             CP       X10 min  X10 min   X10 min                                  "

## 2025-04-10 NOTE — TELEPHONE ENCOUNTER
"Left message for pt to call back for results, if pt calls back please give him results below. Thanks         Dear Mr. Mcdaniels,     I am writing to inform you about the pathology report from your recent colonoscopy.     The pathology report showed that majority of the polyps were adenomas. An adenoma is a \"pre-cancerous\" growth, which means with time it could turn into cancer, if not removed. The polyps were completely removed.     Please inform your siblings and children about this so that they may ask their health care providers about appropriate colon cancer screening.     Given the above finding, I recommend a repeat colonoscopy in 6 months with Golytely preparation for surveillance or sooner if clinically indicated.     Given numerous precancerous polyps on your colonoscopy, I recommend seeing a genetic counselor.  You may call [phone] to schedule an appointment with a genetic counselor to discuss the utility of any genetic testing.      Please follow up with your health care providers.     Thank you for allowing us to participate in your health care needs.     Sincerely,  Andrei Foerman MD covering for Dr. Ortiz   Written by Tera Foreman MD on 4/4/2025  2:20 PM EDT  "

## 2025-04-14 ENCOUNTER — OFFICE VISIT (OUTPATIENT)
Dept: PHYSICAL THERAPY | Facility: CLINIC | Age: 55
End: 2025-04-14
Attending: PHYSICIAN ASSISTANT
Payer: COMMERCIAL

## 2025-04-14 DIAGNOSIS — S46.811A TRAUMATIC RUPTURE OF SUPRASPINATUS TENDON OF RIGHT SHOULDER, INITIAL ENCOUNTER: Primary | ICD-10-CM

## 2025-04-14 DIAGNOSIS — G89.29 CHRONIC RIGHT SHOULDER PAIN: ICD-10-CM

## 2025-04-14 DIAGNOSIS — M25.511 CHRONIC RIGHT SHOULDER PAIN: ICD-10-CM

## 2025-04-14 PROCEDURE — 97140 MANUAL THERAPY 1/> REGIONS: CPT

## 2025-04-14 PROCEDURE — 97110 THERAPEUTIC EXERCISES: CPT

## 2025-04-14 PROCEDURE — 97112 NEUROMUSCULAR REEDUCATION: CPT

## 2025-04-14 NOTE — PROGRESS NOTES
"Daily Note     Today's date: 2025  Patient name: Vu Mcdaniels  : 1970  MRN: [id number]  Referring provider: Clive Solis,*  Dx:   Encounter Diagnosis     ICD-10-CM    1. Traumatic rupture of supraspinatus tendon of right shoulder, initial encounter  S46.811A       2. Chronic right shoulder pain  M25.511     G89.29                      Subjective: Pt has no new c/o. Awaiting surgery .       Objective: See treatment diary below      Assessment: Tolerated treatment well. Patient demonstrated fatigue post treatment, exhibited good technique with therapeutic exercises, and would benefit from continued PT      Plan: Continue per plan of care.        POC Expires Reeval for Medicare to be completed Unit LImit Auth Expiration Date PT/OT/STVisit Limit    By visit  10       Completed on                  VISIT TRACKER  DATE 4/7 4/10 4/14 2/10 2/12 2/17 2/20 2/24 2/27 4/2   Visit # 1 2 3 4 5 6 7 8 9 RE   Remaining 9 8 7 6 5 4 3 2 1           Precautions: HTN       Manuals 4/7 4/10 4/14 2/10 2/12 2/17 2/20 2/24 2/27 4/2   PROM to right shoulder  RR TB MJC  MJC RR MJC RR RR MJC RR                                          Neuro Re-Ed             MTP LTP GTB  5\" 2x10 GTB  5\" 2x10 ea  Grn 5\"x30 GTB  5\" x20 ea GTB  5\" x20 ea GTB  5\" x20 GTB  5\" x20 BTB  5\" x20 Blue   5\" x20                                                                                                                                                                                                   Green   5\" x20    TB ER IR  GTB  2x10 ea GTB 2x10 ea Green 5\"x 30 GTB  5\" x20 GTB 5''20x GTB  X20 IR;   ER GTB  x10   No resistnace x10 GTB  X20 IR;   ER GTB  x10   No resistnace x10 BTB  X20 IR;   ER BTB  x10   No resistnace x10 Blue TB  IR  X20     ER X10 Blue  X10 no resistance    Green  TB  IR  X20     ER X10 Blue  X10 no resistance   B/L TB ER              Supine Cane  5\" x10  5\" x10 Flex 10\"x10 5\" x10 5''10x 5\" x10 5\" x10 5\" x10 " "5\" x10 5\" x10   Compass             Supine ABC              UBE for postural correction 5/5'  120 rpm   5/5'  120 rpm 5'/5' 120 6'/6'  120 rpm 6/6 120 6'/6'  120 rpm 6'/6'  120 rpm 6'/6'  120 rpm 6'/6'  120 rpm 6'/6'  120 rpm                             Ther Ex             [place]  5\" x20  Flex/scap 5\" x20  Flex/scap 5''20x flexion scaption  5\" x20 flex/  scap 5\" x20 flex/  scap 5\"x20    Flex/  scap 5\"x20    Flex/  scap 5\"x20    Flex/  scap 5\" x20  Flex/scap 5\" x20  Flex/scap   Finger ladder  5\" x20  Flex/scap 5\" x20  Flex/scap 5''10x flex scap  5\" x10 flex/scap 5\" x10 flex/scap 5\" x10  Flex/ scap 5\" x10  Flex/ scap 5\" x10  Flex/ scap 5\" x10  Flex/ scap 5\" x10  Flex/ scap   Upper trap stretch              Levator stretch                                                                  Ther Activity                                       Gait Training                                       Modalities             CP    10 xmin   X10 min  X10 min   X10 min                                    "

## 2025-04-16 ENCOUNTER — PREP FOR PROCEDURE (OUTPATIENT)
Dept: GASTROENTEROLOGY | Facility: CLINIC | Age: 55
End: 2025-04-16

## 2025-04-16 ENCOUNTER — TELEPHONE (OUTPATIENT)
Dept: GASTROENTEROLOGY | Facility: CLINIC | Age: 55
End: 2025-04-16

## 2025-04-16 DIAGNOSIS — Z86.0100 HISTORY OF COLON POLYPS: Primary | ICD-10-CM

## 2025-04-16 RX ORDER — SODIUM CHLORIDE, SODIUM LACTATE, POTASSIUM CHLORIDE, CALCIUM CHLORIDE 600; 310; 30; 20 MG/100ML; MG/100ML; MG/100ML; MG/100ML
125 INJECTION, SOLUTION INTRAVENOUS CONTINUOUS
OUTPATIENT
Start: 2025-04-16

## 2025-04-16 NOTE — TELEPHONE ENCOUNTER
----- Message from Cielo JARRETT sent at 4/16/2025  2:03 PM EDT -----    ----- Message -----  From: Ethel Ortiz DO  Sent: 4/16/2025   1:51 PM EDT  To: #    Hi! Order for colonoscopy and golytely prep now in. Thanks so much  ----- Message -----  From: Lucila Shah  Sent: 4/15/2025   1:19 PM EDT  To: DO Dr Diana Zarate,    This patient called today and stated that he needed to call to schedule a repeat colonoscopy in 6 months due in September. Can you please place orders and inform of what bowel prep he should use so we can call the patient back to schedule?      Thank you,

## 2025-04-16 NOTE — TELEPHONE ENCOUNTER
Scheduled date of colonoscopy (as of today): 9/29/25  Physician performing colonoscopy: Dr. Ortiz  Location of colonoscopy: [place]  Bowel prep reviewed with patient: Golytely  Instructions reviewed with patient by: Lucila-sent via [facility]  Clearances: none

## 2025-04-17 ENCOUNTER — OFFICE VISIT (OUTPATIENT)
Dept: PHYSICAL THERAPY | Facility: CLINIC | Age: 55
End: 2025-04-17
Payer: COMMERCIAL

## 2025-04-17 ENCOUNTER — APPOINTMENT (OUTPATIENT)
Dept: LAB | Facility: CLINIC | Age: 55
End: 2025-04-17
Payer: COMMERCIAL

## 2025-04-17 DIAGNOSIS — M25.511 CHRONIC RIGHT SHOULDER PAIN: ICD-10-CM

## 2025-04-17 DIAGNOSIS — S46.811A TRAUMATIC RUPTURE OF SUPRASPINATUS TENDON OF RIGHT SHOULDER, INITIAL ENCOUNTER: Primary | ICD-10-CM

## 2025-04-17 DIAGNOSIS — Z01.812 PRE-OPERATIVE LABORATORY EXAMINATION: ICD-10-CM

## 2025-04-17 DIAGNOSIS — G89.29 CHRONIC RIGHT SHOULDER PAIN: ICD-10-CM

## 2025-04-17 LAB
BASOPHILS # BLD AUTO: 0.1 THOUSANDS/ÂΜL (ref 0–0.1)
BASOPHILS NFR BLD AUTO: 1 % (ref 0–1)
EOSINOPHIL # BLD AUTO: 0.16 THOUSAND/ÂΜL (ref 0–0.61)
EOSINOPHIL NFR BLD AUTO: 2 % (ref 0–6)
ERYTHROCYTE [DISTWIDTH] IN BLOOD BY AUTOMATED COUNT: 13 % (ref 11.6–15.1)
HCT VFR BLD AUTO: 46.8 % (ref 36.5–49.3)
HGB BLD-MCNC: 15.1 G/DL (ref 12–17)
IMM GRANULOCYTES # BLD AUTO: 0.05 THOUSAND/UL (ref 0–0.2)
IMM GRANULOCYTES NFR BLD AUTO: 1 % (ref 0–2)
LYMPHOCYTES # BLD AUTO: 1.67 THOUSANDS/ÂΜL (ref 0.6–4.47)
LYMPHOCYTES NFR BLD AUTO: 17 % (ref 14–44)
MCH RBC QN AUTO: 29.3 PG (ref 26.8–34.3)
MCHC RBC AUTO-ENTMCNC: 32.3 G/DL (ref 31.4–37.4)
MCV RBC AUTO: 91 FL (ref 82–98)
MONOCYTES # BLD AUTO: 0.72 THOUSAND/ÂΜL (ref 0.17–1.22)
MONOCYTES NFR BLD AUTO: 8 % (ref 4–12)
NEUTROPHILS # BLD AUTO: 6.93 THOUSANDS/ÂΜL (ref 1.85–7.62)
NEUTS SEG NFR BLD AUTO: 71 % (ref 43–75)
NRBC BLD AUTO-RTO: 0 /100 WBCS
PLATELET # BLD AUTO: 283 THOUSANDS/UL (ref 149–390)
PMV BLD AUTO: 10.1 FL (ref 8.9–12.7)
RBC # BLD AUTO: 5.15 MILLION/UL (ref 3.88–5.62)
WBC # BLD AUTO: 9.63 THOUSAND/UL (ref 4.31–10.16)

## 2025-04-17 PROCEDURE — 97140 MANUAL THERAPY 1/> REGIONS: CPT

## 2025-04-17 PROCEDURE — 36415 COLL VENOUS BLD VENIPUNCTURE: CPT

## 2025-04-17 PROCEDURE — 97112 NEUROMUSCULAR REEDUCATION: CPT

## 2025-04-17 PROCEDURE — 97110 THERAPEUTIC EXERCISES: CPT

## 2025-04-17 PROCEDURE — 85025 COMPLETE CBC W/AUTO DIFF WBC: CPT

## 2025-04-17 NOTE — PROGRESS NOTES
"Daily Note     Today's date: 2025  Patient name: Vu Mcdaniels  : 1970  MRN: [id number]  Referring provider: Clive Solis,*  Dx:   Encounter Diagnosis     ICD-10-CM    1. Traumatic rupture of supraspinatus tendon of right shoulder, initial encounter  S46.811A       2. Chronic right shoulder pain  M25.511     G89.29                      Subjective: Pt has no new c/o.       Objective: See treatment diary below      Assessment: Tolerated treatment well. Pt has good recall with exercise program. Gradual progression with exercise for scapular stabilization. Patient demonstrated fatigue post treatment, exhibited good technique with therapeutic exercises, and would benefit from continued PT. Progressing as tolerated.       Plan: Continue per plan of care.        POC Expires Reeval for Medicare to be completed Unit LImit Auth Expiration Date PT/OT/STVisit Limit    By visit  10       Completed on                  VISIT TRACKER  DATE 4/7 4/10 4/14 4/17 2/12 2/17 2/20 2/24 2/27 4   Visit # 1 2 3 4 5 6 7 8 9 RE   Remaining 9 8 7 6 5 4 3 2 1           Precautions: HTN       Manuals 4/7 4/10 4/14 4/17 2/12 2/17 2/20 2/24 2/27 4/2   PROM to right shoulder  RR TB MJC  MJC RR MJC RR RR MJC RR                                          Neuro Re-Ed             MTP LTP GTB  5\" 2x10 GTB  5\" 2x10 ea  Grn 5\"x30 Blue TB  5\" x20 ea GTB  5\" x20 ea GTB  5\" x20 GTB  5\" x20 BTB  5\" x20 Blue   5\" x20                                                                                                                                                                                                   Green   5\" x20    TB ER IR  GTB  2x10 ea GTB 2x10 ea Green 5\"x 30 Blue  5\" x20 GTB 5''20x GTB  X20 IR;   ER GTB  x10   No resistnace x10 GTB  X20 IR;   ER GTB  x10   No resistnace x10 BTB  X20 IR;   ER BTB  x10   No resistnace x10 Blue TB  IR  X20     ER X10 Blue  X10 no resistance    Green  TB  IR  X20     ER X10 Blue  X10 " "no resistance   B/L TB ER              Supine Cane  5\" x10  5\" x10 Flex 10\"x10 5\" x10 5''10x 5\" x10 5\" x10 5\" x10 5\" x10 5\" x10   Compass             Supine ABC              UBE for postural correction 5/5'  120 rpm   5/5'  120 rpm 5'/5' 120 5'/5'  120 rpm 6/6 120 6'/6'  120 rpm 6'/6'  120 rpm 6'/6'  120 rpm 6'/6'  120 rpm 6'/6'  120 rpm                             Ther Ex             Pulley  5\" x20  Flex/scap 5\" x20  Flex/scap 5''20x flexion scaption  5\" x20 flex/  scap 5\" x20 flex/  scap 5\"x20    Flex/  scap 5\"x20    Flex/  scap 5\"x20    Flex/  scap 5\" x20  Flex/scap 5\" x20  Flex/scap   Finger ladder  5\" x20  Flex/scap 5\" x20  Flex/scap 5''10x flex scap  5\" x10 flex/scap 5\" x10 flex/scap 5\" x10  Flex/ scap 5\" x10  Flex/ scap 5\" x10  Flex/ scap 5\" x10  Flex/ scap 5\" x10  Flex/ scap   Upper trap stretch              Levator stretch                                                                  Ther Activity                                       Gait Training                                       Modalities             CP    10 xmin 10 min  X10 min  X10 min   X10 min                                      "

## 2025-04-21 ENCOUNTER — OFFICE VISIT (OUTPATIENT)
Dept: PHYSICAL THERAPY | Facility: CLINIC | Age: 55
End: 2025-04-21
Payer: COMMERCIAL

## 2025-04-21 DIAGNOSIS — S46.811A TRAUMATIC RUPTURE OF SUPRASPINATUS TENDON OF RIGHT SHOULDER, INITIAL ENCOUNTER: Primary | ICD-10-CM

## 2025-04-21 DIAGNOSIS — G89.29 CHRONIC RIGHT SHOULDER PAIN: ICD-10-CM

## 2025-04-21 DIAGNOSIS — M25.511 CHRONIC RIGHT SHOULDER PAIN: ICD-10-CM

## 2025-04-21 PROCEDURE — 97112 NEUROMUSCULAR REEDUCATION: CPT

## 2025-04-21 PROCEDURE — 97140 MANUAL THERAPY 1/> REGIONS: CPT

## 2025-04-21 PROCEDURE — 97110 THERAPEUTIC EXERCISES: CPT

## 2025-04-21 NOTE — PROGRESS NOTES
"Daily Note     Today's date: 2025  Patient name: Vu Mcdaniels  : 1970  MRN: [id number]  Referring provider: Clive Solis,*  Dx:   Encounter Diagnosis     ICD-10-CM    1. Traumatic rupture of supraspinatus tendon of right shoulder, initial encounter  S46.811A       2. Chronic right shoulder pain  M25.511     G89.29                      Subjective: Pt reports he performs HEP as tolerated. Awaiting RTC repair surgery .       Objective: See treatment diary below      Assessment: Tolerated treatment well. Patient demonstrated fatigue post treatment, exhibited good technique with therapeutic exercises, and would benefit from continued PT      Plan: Continue per plan of care.        POC Expires Reeval for Medicare to be completed Unit LImit Auth Expiration Date PT/OT/STVisit Limit    By visit  10       Completed on                  VISIT TRACKER  DATE 4/7 4/10 4/14 4/17 4/21 2/17 2/20 2/24 2/27 4/2   Visit # 1 2 3 4 5 6 7 8 9 RE   Remaining 9 8 7 6 5 4 3 2 1           Precautions: HTN       Manuals 4/7 4/10 4/14 4/17 4/21 2/17 2/20 2/24 2/27 4/2   PROM to right shoulder  RR TB MJC  MJC MJC MJC RR RR MJC RR                                          Neuro Re-Ed             MTP LTP GTB  5\" 2x10 GTB  5\" 2x10 ea  Grn 5\"x30 Blue TB  5\" x20 ea Blue  5\" x20 ea GTB  5\" x20 GTB  5\" x20 BTB  5\" x20 Blue   5\" x20                                                                                                                                                                                                   Green   5\" x20    TB ER IR  GTB  2x10 ea GTB 2x10 ea Green 5\"x 30 Blue  5\" x20 Blue  5''20x GTB  X20 IR;   ER GTB  x10   No resistnace x10 GTB  X20 IR;   ER GTB  x10   No resistnace x10 BTB  X20 IR;   ER BTB  x10   No resistnace x10 Blue TB  IR  X20     ER X10 Blue  X10 no resistance    Green  TB  IR  X20     ER X10 Blue  X10 no resistance   B/L TB ER              Supine Cane  5\" x10  5\" x10 Flex " "10\"x10 5\" x10 5''10x 5\" x10 5\" x10 5\" x10 5\" x10 5\" x10   Compass             Supine ABC              UBE for postural correction 5/5'  120 rpm   5/5'  120 rpm 5'/5' 120 5'/5'  120 rpm 5'/5' 100  rpm 6'/6'  120 rpm 6'/6'  120 rpm 6'/6'  120 rpm 6'/6'  120 rpm 6'/6'  120 rpm                             Ther Ex             Pulley  5\" x20  Flex/scap 5\" x20  Flex/scap 5''20x flexion scaption  5\" x20 flex/  scap 5\" x20 flex/  scap 5\"x20    Flex/  scap 5\"x20    Flex/  scap 5\"x20    Flex/  scap 5\" x20  Flex/scap 5\" x20  Flex/scap   Finger ladder  5\" x20  Flex/scap 5\" x20  Flex/scap 5''10x flex scap  5\" x10 flex/scap 5\" x10 flex/scap 5\" x10  Flex/ scap 5\" x10  Flex/ scap 5\" x10  Flex/ scap 5\" x10  Flex/ scap 5\" x10  Flex/ scap   Upper trap stretch              Levator stretch                                                                  Ther Activity                                       Gait Training                                       Modalities             CP R shoulder   10 xmin 10 min MHPX10 min X10 min  X10 min   X10 min                                        "

## 2025-04-21 NOTE — PRE-PROCEDURE INSTRUCTIONS
Pre-Surgery Instructions:   Medication Instructions    aspirin 325 mg tablet Stop taking 7 days prior to surgery. Not prescribed, pt takes for wellness, No cardiac stents/hx. Pt ok with holding.     atorvastatin (LIPITOR) 40 mg tablet Take day of surgery.    Dulaglutide (Trulicity) 1.5 MG/0.5ML SOAJ Stop taking 7 days prior to surgery.    losartan-hydrochlorothiazide (HYZAAR) 100-25 MG per tablet Hold day of surgery.    metFORMIN (GLUCOPHAGE-XR) 500 mg 24 hr tablet Take night before surgery   Medication instructions for day of surgery reviewed. Please take all instructed medications with only a sip of water.       You will receive a call one business day prior to surgery with an arrival time and hospital directions. If your surgery is scheduled on a Monday, the hospital will be calling you on the Friday prior to your surgery. If you have not heard from anyone by 8pm, please call the hospital supervisor through the hospital  at [phone]. ([place] [phone] or [place] [phone]).    Do not eat or drink anything after midnight the night before your surgery, including candy, mints, lifesavers, or chewing gum. Do not drink alcohol 24hrs before your surgery. Try not to smoke at least 24hrs before your surgery.       Follow the pre surgery showering instructions as listed in the “My Surgical Experience Booklet” or otherwise provided by your surgeon's office. Do not use a blade to shave the surgical area 1 week before surgery. It is okay to use a clean electric clippers up to 24 hours before surgery. Do not apply any lotions, creams, including makeup, cologne, deodorant, or perfumes after showering on the day of your surgery. Do not use dry shampoo, hair spray, hair gel, or any type of hair products.     No contact lenses, eye make-up, or artificial eyelashes. Remove nail polish, including gel polish, and any artificial, gel, or acrylic nails if possible. Remove all jewelry including rings and body  piercing jewelry.     Wear causal clothing that is easy to take on and off. Consider your type of surgery.    Keep any valuables, jewelry, piercings at home. Please bring any specially ordered equipment (sling, braces) if indicated.    Arrange for a responsible person to drive you to and from the hospital on the day of your surgery. Please confirm the visitor policy for the day of your procedure when you receive your phone call with an arrival time.     Call the surgeon's office with any new illnesses, exposures, or additional questions prior to surgery.    Please reference your “My Surgical Experience Booklet” for additional information to prepare for your upcoming surgery.

## 2025-04-24 ENCOUNTER — OFFICE VISIT (OUTPATIENT)
Dept: PHYSICAL THERAPY | Facility: CLINIC | Age: 55
End: 2025-04-24
Payer: COMMERCIAL

## 2025-04-24 DIAGNOSIS — G89.29 CHRONIC RIGHT SHOULDER PAIN: ICD-10-CM

## 2025-04-24 DIAGNOSIS — S46.811A TRAUMATIC RUPTURE OF SUPRASPINATUS TENDON OF RIGHT SHOULDER, INITIAL ENCOUNTER: Primary | ICD-10-CM

## 2025-04-24 DIAGNOSIS — M25.511 CHRONIC RIGHT SHOULDER PAIN: ICD-10-CM

## 2025-04-24 PROCEDURE — 97110 THERAPEUTIC EXERCISES: CPT

## 2025-04-24 PROCEDURE — 97140 MANUAL THERAPY 1/> REGIONS: CPT

## 2025-04-24 PROCEDURE — 97112 NEUROMUSCULAR REEDUCATION: CPT

## 2025-04-24 NOTE — PROGRESS NOTES
"Daily Note     Today's date: 2025  Patient name: Vu Mcdaniels  : 1970  MRN: [id number]  Referring provider: Clive Solis,*  Dx:   Encounter Diagnosis     ICD-10-CM    1. Traumatic rupture of supraspinatus tendon of right shoulder, initial encounter  S46.811A       2. Chronic right shoulder pain  M25.511     G89.29                      Subjective: Pt has no new c/o.       Objective: See treatment diary below      Assessment: Tolerated treatment well. Patient demonstrated fatigue post treatment, exhibited good technique with therapeutic exercises, and would benefit from continued PT. Pt is schedule for one more PT treatment prior to surgery 25.       Plan: Continue per plan of care.        POC Expires Reeval for Medicare to be completed Unit LImit Auth Expiration Date PT/OT/STVisit Limit    By visit  10       Completed on                  VISIT TRACKER  DATE 4/7 4/10 4/14 4/17 4/21 4/24 2/20 2/24 2/27 4/2   Visit # 1 2 3 4 5 6 7 8 9 RE   Remaining 9 8 7 6 5 4 3 2 1           Precautions: HTN       Manuals 4/7 4/10 4/14 4/17 4/21 4/24 2/20 2/24 2/27 4/2   PROM to right shoulder  RR TB MJC  MJC MJC MJC RR RR MJC RR                                          Neuro Re-Ed             MTP LTP GTB  5\" 2x10 GTB  5\" 2x10 ea  Grn 5\"x30 Blue TB  5\" x20 ea Blue  5\" x20 ea Blue  5\" x20  ea GTB  5\" x20 BTB  5\" x20 Blue   5\" x20                                                                                                                                                                                                   Green   5\" x20    TB ER IR  GTB  2x10 ea GTB 2x10 ea Green 5\"x 30 Blue  5\" x20 Blue  5''20x Blue  X20  GTB  X20 IR;   ER GTB  x10   No resistnace x10 BTB  X20 IR;   ER BTB  x10   No resistnace x10 Blue TB  IR  X20     ER X10 Blue  X10 no resistance    Green  TB  IR  X20     ER X10 Blue  X10 no resistance   B/L TB ER              Supine Cane  5\" x10  5\" x10 Flex 10\"x10 5\" x10 " "5''10x 5\" x10 5\" x10 5\" x10 5\" x10 5\" x10   Compass             Supine ABC              UBE for postural correction 5/5'  120 rpm   5/5'  120 rpm 5'/5' 120 5'/5'  120 rpm 5'/5' 100  rpm 6'/6'  120 rpm 6'/6'  120 rpm 6'/6'  120 rpm 6'/6'  120 rpm 6'/6'  120 rpm                             Ther Ex             Pulley  5\" x20  Flex/scap 5\" x20  Flex/scap 5''20x flexion scaption  5\" x20 flex/  scap 5\" x20 flex/  scap 5\"x20    Flex/  scap 5\"x20    Flex/  scap 5\"x20    Flex/  scap 5\" x20  Flex/scap 5\" x20  Flex/scap   Finger ladder  5\" x20  Flex/scap 5\" x20  Flex/scap 5''10x flex scap  5\" x10 flex/scap 5\" x10 flex/scap 5\" x10  Flex/ scap 5\" x10  Flex/ scap 5\" x10  Flex/ scap 5\" x10  Flex/ scap 5\" x10  Flex/ scap   Upper trap stretch              Levator stretch                                                                  Ther Activity                                       Gait Training                                       Modalities             CP R shoulder   10 xmin 10 min MHPX10 min X10 min  X10 min   X10 min                                          "

## 2025-04-28 ENCOUNTER — OFFICE VISIT (OUTPATIENT)
Dept: PHYSICAL THERAPY | Facility: CLINIC | Age: 55
End: 2025-04-28
Payer: COMMERCIAL

## 2025-04-28 DIAGNOSIS — M25.511 CHRONIC RIGHT SHOULDER PAIN: ICD-10-CM

## 2025-04-28 DIAGNOSIS — G89.29 CHRONIC RIGHT SHOULDER PAIN: ICD-10-CM

## 2025-04-28 DIAGNOSIS — S46.811A TRAUMATIC RUPTURE OF SUPRASPINATUS TENDON OF RIGHT SHOULDER, INITIAL ENCOUNTER: Primary | ICD-10-CM

## 2025-04-28 PROCEDURE — 97110 THERAPEUTIC EXERCISES: CPT | Performed by: PHYSICAL THERAPIST

## 2025-04-28 PROCEDURE — 97112 NEUROMUSCULAR REEDUCATION: CPT | Performed by: PHYSICAL THERAPIST

## 2025-04-28 PROCEDURE — 97140 MANUAL THERAPY 1/> REGIONS: CPT | Performed by: PHYSICAL THERAPIST

## 2025-04-28 NOTE — PROGRESS NOTES
PT Re-Evaluation     Today's date: 2025  Patient name: Vu Mcdaniels  : 1970  MRN: [id number]  Referring provider: Clive Mesa PA-C  Dx:   Encounter Diagnosis     ICD-10-CM    1. Chronic right shoulder pain  M25.511     G89.29            Assessment  Impairments: abnormal coordination, abnormal muscle firing, abnormal or restricted ROM, activity intolerance, impaired physical strength, lacks appropriate home exercise program, pain with function and safety issue  Functional limitations: Difficulty with lifting at and above shoulder height.  Symptom irritability: moderate    Assessment details: Vu Mcdaniels is a 54 y.o. male who presents to outpatient PT with a  Chronic right shoulder pain  (primary encounter diagnosis).  No further referral appears necessary at this time based upon examination results. Exam findings consistent with rotator  cuff/ labral pathology. Pt presents with decreased strength, ROM, balance, functional activity tolerance, and pain with movement in his right shoulder  which is  limiting his ability to perform the aforementioned functional activities.  Etiologic factors include repetitive poor body mechanics. Prognosis is good given HEP compliance and PT 2-3/wk.  Please contact me if you have any questions or recommendations.  Thank you for the opportunity to share in  [facility].     RA 3/3    Vu Mcdaniels has demonstrated decreased pain, increased strength, increased range of motion, and increased activity tolerance since starting physical therapy services. Minimal improvements in the right shoulder since starting skilled PT. Pt is scheduled for MRI on 3/17. Will continue conservative PT until MRI. Pt in agreement with the plan. Weakness and decreased ROM Primarily into right shoulder abduction   They continue to present with pain, decreased strength, decreased range of motion,and decreased activity tolerance and would benefit from additional skilled physical therapy  interventions to address impairments and maximize function.    RA 4/2    Vu Mcdaniels has demonstrated decreased pain, increased strength, increased range of motion, and increased activity tolerance since starting physical therapy services. Minimal improvements in the right shoulder since starting skilled PT. MRI shows right rotator cuff tear. Pt is currently scheduled for right rotator cuff repair DOS 4/30 Dr. Walden Will continue skilled PT until 4/30 to preserve ROM.   Weakness and decreased ROM Primarily into right shoulder abduction  continues   He continue to present with pain, decreased strength, decreased range of motion,and decreased activity tolerance and would benefit from additional skilled physical therapy interventions to address impairments and maximize function.    RA 5/5/25: 1st post-op treatment  The patient presents to PT today s/p R shoulder surgery on 4/30/25 by Dr. Walden.  He had been seen at this clinic for a total of visits pre-op with his last treatment on 4/28/25.  He has complaints of constant pain.  He demonstrates deficits with decreased A/PROM and strength, decreased flexibility, decreased postural awareness and TTP.  He is using sling R UE at all times except .  The patient would benefit from continued PT to address deficits and improve function.  Tx to include ROM, stretching, strengthening, modalities, HEP, pt education, postural ed, lifting/body mechanics, neuro re-ed, balance/proprioception Te, MT and equipment.  Plan to continue with PT and will progress as able in upcoming visits with ROM, stretching, strengthening, flexibility, posture, HEP as per protocol.          Understanding of Dx/Px/POC: good     Prognosis: good    Goals  STG to be achieved in 4 weeks     1. Pt will reduce subjective pain rating by at least 50 percent the help facilitate return to PLOF  Progressing   2. Pt will improve right shoulder PROM/ AROMby at least 10 degrees to help promote improved functional  activity tolerance   Progressing   3. Pt will improve right shoulder MMT scores by at least 1/2 grade to promote improved functional activity tolerance   Progressing          LTG to be achieved in 6-8 weeks  1. Pt will be complaint with HEP  Progressing   2. Pt will improve Right shoulder AROM to WFL, to help facilitate independence with ADL's, IADL's, and functional activities   Progressing   3. Pt will improve right shoulder Strength to WFL to help facilitate independence with ADL's, IADL's, and functional activities   Progressing   4. Pt will have no limitations with ambulation to help facilitate independence at home and in the community.   Progressing   5. Pt will have no limitations with stair negotiation to help facilitate independence at home and in the community   Progressing           Plan  Patient would benefit from: skilled physical therapy    Planned therapy interventions: ADL training, balance, balance/weight bearing training, flexibility, functional ROM exercises, gait training, graded activity, graded exercise, graded motor, home exercise program, joint mobilization, manual therapy, neuromuscular re-education, patient education, postural training, strengthening, stretching, therapeutic activities and therapeutic exercise    Frequency: 2x-3x week  Duration in weeks: 12  Plan of Care beginning date: 5/5/2025  Plan of Care expiration date: 7/28/2025  Treatment plan discussed with: patient, PTA and referring physician        Subjective Evaluation    History of Present Illness  Mechanism of injury: 54 year old male who presents to outpatient PT with CC of right shoulder pain since 12/25. The patient reports he slipped and fell outside on [place] night, He fell on his left side, but reports increased pain in his right shoulder today. He reports localized pain to the lateral aspect of the right shoulder, reports pain in the shoulder feels like a pulling deep pain. Denies radicular sx. Pain is exacerbated  with reaching into Hz adduction. He does report occasional clicking/ popping in the shoulder. Goals: to gain more function in the shoulder.     UPDATE RE 25:  The patient is s/p same day R shoulder surgery on  by Dr. Walden at [facility].  He has complaints of pain.    He will be going back to see the doctor on for his follow up appointment.          Patient Goals  Patient goals for therapy: increased strength, decreased pain and increased motion  Patient goal: to gain more motion in the shoulder  Pain    RA   RA 4/2  RA    Current pain ratin  8  4  At best pain ratin  5  4  At worst pain ratin  8  6  Quality: pressure  Aggravating factors: lifting  Progression: worsening        Objective     Active Range of Motion     Right Shoulder           RA 3/3  RA   RA   Flexion: 140 degrees           145   160   NT  Abduction: 90 degrees          100  105   NT  External rotation BTH: Active external rotation behind the head: occiput.    Occiput  C2  NT  Internal rotation BTB: Active internal rotation behind the back: Sacrum.    L5   L5  NT    Passive Range of Motion  R Shoulder  Flexion:  Abduction:  External Rotation:  Internal Rotation:    Strength/Myotome Testing     Right Shoulder        RA  RA 4/     RA    Planes of Motion   Flexion: 3-     3- 3/5     NT  Abduction: 2     2 2/5     NT  External rotation at 0°: 2   2 2/5     NT  Internal rotation at 0°: 2   2 2/5     NT    Tests     Right Shoulder   Positive active compression ([place]) and empty can.

## 2025-04-28 NOTE — PROGRESS NOTES
"Daily Note     Today's date: 2025  Patient name: Vu Mcdaniels  : 1970  MRN: [id number]  Referring provider: Clive Solis,*  Dx:   Encounter Diagnosis     ICD-10-CM    1. Traumatic rupture of supraspinatus tendon of right shoulder, initial encounter  S46.811A       2. Chronic right shoulder pain  M25.511     G89.29                      Subjective: Pt reports he is doing ok today. He is scheduled for right RTC repair on . Will continue PT once medically cleared post op      Objective: See treatment diary below      Assessment: Tolerated treatment well. Patient exhibited good technique with therapeutic exercises and would benefit from continued PT      Plan: Continue per plan of care.        POC Expires Reeval for Medicare to be completed Unit LImit Auth Expiration Date PT/OT/STVisit Limit    By visit  10       Completed on                  VISIT TRACKER  DATE 4/7 4/10 4/14 4/17 4/21 4/24 4/28 2/24 2/27 4/2   Visit # 1 2 3 4 5 6 5 8 9 RE   Remaining 9 8 7 6 5 4 5 2 1           Precautions: HTN       Manuals 4/7 4/10 4/14 4/17 4/21 4/24 4/28 2/24 2/27 4/2   PROM to right shoulder  RR TB MJC  MJC MJC MJC RR RR MJC RR                                          Neuro Re-Ed             MTP LTP GTB  5\" 2x10 GTB  5\" 2x10 ea  Grn 5\"x30 Blue TB  5\" x20 ea Blue  5\" x20 ea Blue  5\" x20  ea Blue   5\" x20 BTB  5\" x20 Blue   5\" x20                                                                                                                                                                                                   Green   5\" x20    TB ER IR  GTB  2x10 ea GTB 2x10 ea Green 5\"x 30 Blue  5\" x20 Blue  5''20x Blue  X20  Blue  x20 BTB  X20 IR;   ER BTB  x10   No resistnace x10 Blue TB  IR  X20     ER X10 Blue  X10 no resistance    Green  TB  IR  X20     ER X10 Blue  X10 no resistance   B/L TB ER              Supine Cane  5\" x10  5\" x10 Flex 10\"x10 5\" x10 5''10x 5\" x10 5\" x10 5\" x10 5\" x10 5\" " "x10   Compass             Supine ABC              UBE for postural correction 5/5'  120 rpm   5/5'  120 rpm 5'/5' 120 5'/5'  120 rpm 5'/5' 100  rpm 6'/6'  120 rpm 6'/6'  120 rpm 6'/6'  120 rpm 6'/6'  120 rpm 6'/6'  120 rpm                             Ther Ex             Pulley  5\" x20  Flex/scap 5\" x20  Flex/scap 5''20x flexion scaption  5\" x20 flex/  scap 5\" x20 flex/  scap 5\"x20    Flex/  scap 5\"x20    Flex/  scap 5\"x20    Flex/  scap 5\" x20  Flex/scap 5\" x20  Flex/scap   Finger ladder  5\" x20  Flex/scap 5\" x20  Flex/scap 5''10x flex scap  5\" x10 flex/scap 5\" x10 flex/scap 5\" x10  Flex/ scap 5\" x10  Flex/ scap 5\" x10  Flex/ scap 5\" x10  Flex/ scap 5\" x10  Flex/ scap   Upper trap stretch              Levator stretch                                                                  Ther Activity                                       Gait Training                                       Modalities             CP R shoulder   10 xmin 10 min MHPX10 min X10 min  X10 min   X10 min                                            "

## 2025-04-29 PROCEDURE — NC001 PR NO CHARGE: Performed by: ORTHOPAEDIC SURGERY

## 2025-04-29 NOTE — H&P
H&P - Orthopedics   Name: Vu Mcdaniels 54 y.o. male I MRN: [id number]  Unit/Bed#:  I Date of Admission: (Not on file)   Date of Service: 4/29/2025 I Hospital Day: 0     Assessment & Plan  Traumatic rupture of supraspinatus tendon of right shoulder, initial encounter  Elective right shoulder arthroscopy with possible rotator cuff repair    History of Present Illness   HPI: Vu Mcdaniels is a 54 y.o. year old male who presented to our office complaining of right shoulder pain with decreased range of motion and strength which started after he slipped and fell on ice.  An MRI arthrogram was ordered which was consistent with a rotator cuff tear.  The patient stated that his symptoms were continuing to worsen starting to affect his quality of life.  After exhausting conservative treatment, the risks and benefits of surgery were discussed with the patient.  He decided on and a right shoulder arthroscopy with possible rotator cuff repair.    Review of Systems   Constitutional:  Negative for chills, fever and unexpected weight change.   HENT:  Negative for hearing loss, nosebleeds and sore throat.    Eyes:  Negative for pain, redness and visual disturbance.   Respiratory:  Negative for cough, shortness of breath and wheezing.    Cardiovascular:  Negative for chest pain, palpitations and leg swelling.   Gastrointestinal:  Negative for abdominal pain, nausea and vomiting.   Endocrine: Negative for polydipsia and polyuria.   Genitourinary:  Negative for dysuria and hematuria.   Musculoskeletal:  Positive for arthralgias and myalgias.        As noted in HPI   Skin:  Negative for rash and wound.   Neurological:  Negative for dizziness, numbness and headaches.   Psychiatric/Behavioral:  Negative for decreased concentration and suicidal ideas. The patient is not nervous/anxious.     significant for findings described in the HPI.  Historical Information   Past Medical History:   Diagnosis Date    CPAP (continuous positive  "airway pressure) dependence     Hypercholesterolemia     Hypertension     Prediabetes     Sleep apnea      Past Surgical History:   Procedure Laterality Date    COLONOSCOPY      FL INJECTION RIGHT SHOULDER (ARTHROGRAM)  03/17/2025    HERNIA REPAIR Bilateral     REPAIR LABRUM Right      Social History     Tobacco Use    Smoking status: Some Days     Types: Cigars     Passive exposure: Never    Smokeless tobacco: Never    Tobacco comments:     Smokes cigar on occasion   Vaping Use    Vaping status: Never Used   Substance and Sexual Activity    Alcohol use: Yes     Comment: social    Drug use: Never    Sexual activity: Not on file     E-Cigarette/Vaping    E-Cigarette Use Never User      E-Cigarette/Vaping Substances    Nicotine No     THC No     CBD No     Flavoring No     Other No     Unknown No      Family history non-contributory    Objective :     Physical ExamOrtho Exam   Right upper extremity is neurovascular intact  Fingers are pink and mobile  Compartments are soft  Range of motion of the shoulder is from 0 to 110 degrees of forward flexion and abduction  Rotator cuff strength 3 and half out of 5  Brisk cap refill  Sensation intact  Cardiovascular: Normal rate    Pulmonary: Effort normal  Abdomen: Soft, nontender, nondistended      Lab Results: I have reviewed the following results:   No results for input(s): \"WBC\", \"HGB\", \"HCT\", \"PLT\", \"BANDSPCT\", \"BUN\", \"CREATININE\", \"PTT\", \"INR\", \"ESR\", \"CRP\" in the last 72 hours.  Blood Culture: No results found for: \"BLOODCX\"  Wound Culture: No results found for: \"WOUNDCULT\"    Imaging Results Review: I personally reviewed the following image studies in PACS and associated radiology reports: MRI arthrogram right shoulder. My interpretation of the radiology images/reports is: Rotator cuff tear.  Other Study Results Review: No additional pertinent studies reviewed.  "

## 2025-04-30 ENCOUNTER — ANESTHESIA EVENT (OUTPATIENT)
Dept: PERIOP | Facility: HOSPITAL | Age: 55
End: 2025-04-30
Payer: COMMERCIAL

## 2025-04-30 ENCOUNTER — HOSPITAL ENCOUNTER (OUTPATIENT)
Facility: HOSPITAL | Age: 55
Setting detail: OUTPATIENT SURGERY
Discharge: HOME | End: 2025-04-30
Attending: ORTHOPAEDIC SURGERY | Admitting: ORTHOPAEDIC SURGERY
Payer: COMMERCIAL

## 2025-04-30 ENCOUNTER — ANESTHESIA (OUTPATIENT)
Dept: PERIOP | Facility: HOSPITAL | Age: 55
End: 2025-04-30
Payer: COMMERCIAL

## 2025-04-30 VITALS
TEMPERATURE: 98 F | BODY MASS INDEX: 41.5 KG/M2 | SYSTOLIC BLOOD PRESSURE: 134 MMHG | RESPIRATION RATE: 20 BRPM | WEIGHT: 265 LBS | OXYGEN SATURATION: 97 % | DIASTOLIC BLOOD PRESSURE: 70 MMHG | HEART RATE: 90 BPM

## 2025-04-30 DIAGNOSIS — S46.811A TRAUMATIC RUPTURE OF SUPRASPINATUS TENDON OF RIGHT SHOULDER, INITIAL ENCOUNTER: Primary | ICD-10-CM

## 2025-04-30 LAB — GLUCOSE SERPL-MCNC: 141 MG/DL (ref 65–140)

## 2025-04-30 PROCEDURE — 29827 SHO ARTHRS SRG RT8TR CUF RPR: CPT | Performed by: ORTHOPAEDIC SURGERY

## 2025-04-30 PROCEDURE — C1713 ANCHOR/SCREW BN/BN,TIS/BN: HCPCS | Performed by: ORTHOPAEDIC SURGERY

## 2025-04-30 PROCEDURE — 29826 SHO ARTHRS SRG DECOMPRESSION: CPT | Performed by: ORTHOPAEDIC SURGERY

## 2025-04-30 PROCEDURE — 29826 SHO ARTHRS SRG DECOMPRESSION: CPT | Performed by: PHYSICIAN ASSISTANT

## 2025-04-30 PROCEDURE — 29827 SHO ARTHRS SRG RT8TR CUF RPR: CPT | Performed by: PHYSICIAN ASSISTANT

## 2025-04-30 PROCEDURE — 82948 REAGENT STRIP/BLOOD GLUCOSE: CPT

## 2025-04-30 DEVICE — 4.75MM BC KNOTLESS SWIVELOCK
Type: IMPLANTABLE DEVICE | Site: SHOULDER | Status: FUNCTIONAL
Brand: ARTHREX®

## 2025-04-30 RX ORDER — HYDROCODONE BITARTRATE AND ACETAMINOPHEN 5; 325 MG/1; MG/1
1 TABLET ORAL EVERY 6 HOURS PRN
Qty: 21 TABLET | Refills: 0 | Status: SHIPPED | OUTPATIENT
Start: 2025-04-30 | End: 2025-05-10

## 2025-04-30 RX ORDER — SODIUM CHLORIDE, SODIUM LACTATE, POTASSIUM CHLORIDE, CALCIUM CHLORIDE 600; 310; 30; 20 MG/100ML; MG/100ML; MG/100ML; MG/100ML
125 INJECTION, SOLUTION INTRAVENOUS CONTINUOUS
Status: DISCONTINUED | OUTPATIENT
Start: 2025-04-30 | End: 2025-04-30 | Stop reason: HOSPADM

## 2025-04-30 RX ORDER — METOCLOPRAMIDE HYDROCHLORIDE 5 MG/ML
10 INJECTION INTRAMUSCULAR; INTRAVENOUS ONCE AS NEEDED
Status: DISCONTINUED | OUTPATIENT
Start: 2025-04-30 | End: 2025-04-30 | Stop reason: HOSPADM

## 2025-04-30 RX ORDER — ROCURONIUM BROMIDE 10 MG/ML
INJECTION, SOLUTION INTRAVENOUS AS NEEDED
Status: DISCONTINUED | OUTPATIENT
Start: 2025-04-30 | End: 2025-04-30

## 2025-04-30 RX ORDER — ONDANSETRON 2 MG/ML
4 INJECTION INTRAMUSCULAR; INTRAVENOUS EVERY 6 HOURS PRN
Status: DISCONTINUED | OUTPATIENT
Start: 2025-04-30 | End: 2025-04-30 | Stop reason: HOSPADM

## 2025-04-30 RX ORDER — CHLORHEXIDINE GLUCONATE 40 MG/ML
SOLUTION TOPICAL DAILY PRN
Status: DISCONTINUED | OUTPATIENT
Start: 2025-04-30 | End: 2025-04-30 | Stop reason: HOSPADM

## 2025-04-30 RX ORDER — BUPIVACAINE HYDROCHLORIDE 5 MG/ML
INJECTION, SOLUTION EPIDURAL; INTRACAUDAL; PERINEURAL
Status: COMPLETED | OUTPATIENT
Start: 2025-04-30 | End: 2025-04-30

## 2025-04-30 RX ORDER — HYDROCODONE BITARTRATE AND ACETAMINOPHEN 5; 325 MG/1; MG/1
1 TABLET ORAL EVERY 6 HOURS PRN
Status: DISCONTINUED | OUTPATIENT
Start: 2025-04-30 | End: 2025-04-30 | Stop reason: HOSPADM

## 2025-04-30 RX ORDER — HYDROMORPHONE HCL/PF 1 MG/ML
0.5 SYRINGE (ML) INJECTION
Status: DISCONTINUED | OUTPATIENT
Start: 2025-04-30 | End: 2025-04-30 | Stop reason: HOSPADM

## 2025-04-30 RX ORDER — MIDAZOLAM HYDROCHLORIDE 2 MG/2ML
INJECTION, SOLUTION INTRAMUSCULAR; INTRAVENOUS AS NEEDED
Status: DISCONTINUED | OUTPATIENT
Start: 2025-04-30 | End: 2025-04-30

## 2025-04-30 RX ORDER — FENTANYL CITRATE/PF 50 MCG/ML
50 SYRINGE (ML) INJECTION
Status: DISCONTINUED | OUTPATIENT
Start: 2025-04-30 | End: 2025-04-30 | Stop reason: HOSPADM

## 2025-04-30 RX ORDER — PROPOFOL 10 MG/ML
INJECTION, EMULSION INTRAVENOUS AS NEEDED
Status: DISCONTINUED | OUTPATIENT
Start: 2025-04-30 | End: 2025-04-30

## 2025-04-30 RX ORDER — DEXAMETHASONE SODIUM PHOSPHATE 10 MG/ML
INJECTION, SOLUTION INTRAMUSCULAR; INTRAVENOUS AS NEEDED
Status: DISCONTINUED | OUTPATIENT
Start: 2025-04-30 | End: 2025-04-30

## 2025-04-30 RX ORDER — BUPIVACAINE HYDROCHLORIDE AND EPINEPHRINE 5; 5 MG/ML; UG/ML
INJECTION, SOLUTION PERINEURAL AS NEEDED
Status: DISCONTINUED | OUTPATIENT
Start: 2025-04-30 | End: 2025-04-30 | Stop reason: HOSPADM

## 2025-04-30 RX ORDER — MELOXICAM 15 MG/1
15 TABLET ORAL DAILY
Qty: 30 TABLET | Refills: 0 | Status: SHIPPED | OUTPATIENT
Start: 2025-04-30

## 2025-04-30 RX ORDER — POVIDONE-IODINE 10 MG/G
OINTMENT TOPICAL AS NEEDED
Status: DISCONTINUED | OUTPATIENT
Start: 2025-04-30 | End: 2025-04-30 | Stop reason: HOSPADM

## 2025-04-30 RX ORDER — CHLORHEXIDINE GLUCONATE ORAL RINSE 1.2 MG/ML
15 SOLUTION DENTAL ONCE
Status: COMPLETED | OUTPATIENT
Start: 2025-04-30 | End: 2025-04-30

## 2025-04-30 RX ORDER — FENTANYL CITRATE 50 UG/ML
INJECTION, SOLUTION INTRAMUSCULAR; INTRAVENOUS AS NEEDED
Status: DISCONTINUED | OUTPATIENT
Start: 2025-04-30 | End: 2025-04-30

## 2025-04-30 RX ORDER — ONDANSETRON 2 MG/ML
INJECTION INTRAMUSCULAR; INTRAVENOUS AS NEEDED
Status: DISCONTINUED | OUTPATIENT
Start: 2025-04-30 | End: 2025-04-30

## 2025-04-30 RX ADMIN — CHLORHEXIDINE GLUCONATE 15 ML: 1.2 SOLUTION ORAL at 10:13

## 2025-04-30 RX ADMIN — SODIUM CHLORIDE, SODIUM LACTATE, POTASSIUM CHLORIDE, AND CALCIUM CHLORIDE 125 ML/HR: .6; .31; .03; .02 INJECTION, SOLUTION INTRAVENOUS at 10:14

## 2025-04-30 RX ADMIN — CEFAZOLIN 3000 MG: 1 INJECTION, POWDER, FOR SOLUTION INTRAMUSCULAR; INTRAVENOUS at 12:44

## 2025-04-30 RX ADMIN — FENTANYL CITRATE 50 MCG: 50 INJECTION, SOLUTION INTRAMUSCULAR; INTRAVENOUS at 12:46

## 2025-04-30 RX ADMIN — PROPOFOL 300 MG: 10 INJECTION, EMULSION INTRAVENOUS at 12:46

## 2025-04-30 RX ADMIN — BUPIVACAINE HYDROCHLORIDE 10 ML: 5 INJECTION, SOLUTION EPIDURAL; INTRACAUDAL; PERINEURAL at 10:58

## 2025-04-30 RX ADMIN — ONDANSETRON 4 MG: 2 INJECTION INTRAMUSCULAR; INTRAVENOUS at 13:50

## 2025-04-30 RX ADMIN — MIDAZOLAM 2 MG: 1 INJECTION INTRAMUSCULAR; INTRAVENOUS at 10:56

## 2025-04-30 RX ADMIN — SUGAMMADEX 250 MG: 100 INJECTION, SOLUTION INTRAVENOUS at 14:12

## 2025-04-30 RX ADMIN — DEXAMETHASONE SODIUM PHOSPHATE 10 MG: 10 INJECTION, SOLUTION INTRAMUSCULAR; INTRAVENOUS at 12:47

## 2025-04-30 RX ADMIN — BUPIVACAINE 20 ML: 13.3 INJECTION, SUSPENSION, LIPOSOMAL INFILTRATION at 10:58

## 2025-04-30 RX ADMIN — PHENYLEPHRINE HYDROCHLORIDE 60 MCG/MIN: 10 INJECTION INTRAVENOUS at 12:58

## 2025-04-30 RX ADMIN — FENTANYL CITRATE 50 MCG: 50 INJECTION, SOLUTION INTRAMUSCULAR; INTRAVENOUS at 10:56

## 2025-04-30 RX ADMIN — SODIUM CHLORIDE, SODIUM LACTATE, POTASSIUM CHLORIDE, AND CALCIUM CHLORIDE: .6; .31; .03; .02 INJECTION, SOLUTION INTRAVENOUS at 13:52

## 2025-04-30 RX ADMIN — ROCURONIUM BROMIDE 50 MG: 10 INJECTION, SOLUTION INTRAVENOUS at 12:47

## 2025-04-30 RX ADMIN — PROPOFOL 50 MCG/KG/MIN: 10 INJECTION, EMULSION INTRAVENOUS at 12:53

## 2025-04-30 NOTE — ANESTHESIA PROCEDURE NOTES
Peripheral Block    Patient location during procedure: holding area  Start time: 4/30/2025 10:50 AM  Reason for block: at surgeon's request and post-op pain management  Staffing  Performed by: Jeffrey Concepcion MD  Authorized by: Jeffrey Concepcion MD    Preanesthetic Checklist  Completed: patient identified, IV checked, site marked, risks and benefits discussed, surgical consent, monitors and equipment checked, pre-op evaluation and timeout performed  Peripheral Block  Patient position: supine  Prep: ChloraPrep  Patient monitoring: frequent blood pressure checks, continuous pulse oximetry and heart rate  Block type: Interscalene  Laterality: right  Injection technique: single-shot  Procedures: ultrasound guided, Ultrasound guidance required for the procedure to increase accuracy and safety of medication placement and decrease risk of complications.  Ultrasound permanent image saved  bupivacaine (PF) (MARCAINE) 0.5 % injection 20 mL - Perineural   10 mL - 4/30/2025 10:58:00 AM  bupivacaine liposomal (EXPAREL) 1.3 % injection 20 mL - Perineural   20 mL - 4/30/2025 10:58:00 AM  Needle  Needle type: Stimuplex   Needle gauge: 20 G  Needle length: 4 in  Needle localization: anatomical landmarks and ultrasound guidance  Assessment  Injection assessment: incremental injection, frequent aspiration, injected with ease, negative aspiration, negative for heart rate change, no paresthesia on injection, no symptoms of intraneural/intravenous injection and needle tip visualized at all times  Paresthesia pain: none  Post-procedure:  site cleaned  patient tolerated the procedure well with no immediate complications

## 2025-04-30 NOTE — ANESTHESIA PREPROCEDURE EVALUATION
Procedure:  RIGHT SHOULDER ARTHROSCOPY POSSIBLE ROTATOR CUFF REPAIR (Right: Shoulder)    Relevant Problems   CARDIO   (+) Cardiac murmur   (+) Hyperlipemia   (+) Hypertension      ENDO   (+) Type 2 diabetes mellitus with other specified complication (HCC)      PULMONARY   (+) ESTEPHANIA (obstructive sleep apnea)        Physical Exam    Airway    Mallampati score: II  TM Distance: >3 FB  Neck ROM: full     Dental   No notable dental hx     Cardiovascular      Pulmonary      Other Findings        Anesthesia Plan  ASA Score- 3     Anesthesia Type- general with ASA Monitors.         Additional Monitors:     Airway Plan: ETT.           Plan Factors-Exercise tolerance (METS): >4 METS.    Chart reviewed. EKG reviewed.  Existing labs reviewed. Patient summary reviewed.    Patient is a current smoker.  Patient instructed to abstain from smoking on day of procedure. Patient did not smoke on day of surgery.    There is medical exclusion for perioperative obstructive sleep apnea risk education.        Induction- intravenous.    Postoperative Plan- Plan for postoperative opioid use.         Informed Consent- Anesthetic plan and risks discussed with patient.  I personally reviewed this patient with the CRNA. Discussed and agreed on the Anesthesia Plan with the CRNA..      NPO Status:  Vitals Value Taken Time   Date of last liquid 04/29/25 04/30/25 1011   Time of last liquid 2330 04/30/25 1011   Date of last solid 04/29/25 04/30/25 1011   Time of last solid 2000 04/30/25 1011

## 2025-04-30 NOTE — PROGRESS NOTES
PT Re-Evaluation     Today's date: 2025  Patient name: Vu Mcdaniels  : 1970  MRN: [id number]  Referring provider: Clive Mesa PA-C  Dx:   Encounter Diagnosis     ICD-10-CM    1. Chronic right shoulder pain  M25.511     G89.29    2.      Traumatic rupture of supraspinatus          S46.811D           Tendon on right shoulder,            Subsequent encounter        Assessment  Impairments: abnormal coordination, abnormal muscle firing, abnormal or restricted ROM, activity intolerance, impaired physical strength, lacks appropriate home exercise program, pain with function and safety issue  Functional limitations: Difficulty with lifting at and above shoulder height.  Symptom irritability: moderate    Assessment details: Vu Mcdaniels is a 54 y.o. male who presents to outpatient PT with a  Chronic right shoulder pain  (primary encounter diagnosis).  No further referral appears necessary at this time based upon examination results. Exam findings consistent with rotator  cuff/ labral pathology. Pt presents with decreased strength, ROM, balance, functional activity tolerance, and pain with movement in his right shoulder  which is  limiting his ability to perform the aforementioned functional activities.  Etiologic factors include repetitive poor body mechanics. Prognosis is good given HEP compliance and PT 2-3/wk.  Please contact me if you have any questions or recommendations.  Thank you for the opportunity to share in  Uv care.     RA 3/3    Vu Mcdaniels has demonstrated decreased pain, increased strength, increased range of motion, and increased activity tolerance since starting physical therapy services. Minimal improvements in the right shoulder since starting skilled PT. Pt is scheduled for MRI on 3/17. Will continue conservative PT until MRI. Pt in agreement with the plan. Weakness and decreased ROM Primarily into right shoulder abduction   They continue to present with pain, decreased  strength, decreased range of motion,and decreased activity tolerance and would benefit from additional skilled physical therapy interventions to address impairments and maximize function.    RA 4/2    Vu Mcdaniels has demonstrated decreased pain, increased strength, increased range of motion, and increased activity tolerance since starting physical therapy services. Minimal improvements in the right shoulder since starting skilled PT. MRI shows right rotator cuff tear. Pt is currently scheduled for right rotator cuff repair DOS 4/30 Dr. Walden Will continue skilled PT until 4/30 to preserve ROM.   Weakness and decreased ROM Primarily into right shoulder abduction  continues   He continue to present with pain, decreased strength, decreased range of motion,and decreased activity tolerance and would benefit from additional skilled physical therapy interventions to address impairments and maximize function.    RA 5/6/25: 1st post-op treatment  The patient presents to PT today s/p R shoulder surgery on 4/30/25 by Dr. Walden.  He had been seen at this clinic for a total of visits pre-op with his last treatment on 4/28/25.  He has complaints of constant pain.  He demonstrates deficits with decreased A/PROM and strength, decreased flexibility, decreased postural awareness and TTP.  He is using sling R UE at all times except .  The patient would benefit from continued PT to address deficits and improve function.  Tx to include ROM, stretching, strengthening, modalities, HEP, pt education, postural ed, lifting/body mechanics, neuro re-ed, balance/proprioception Te, MT and equipment.  Plan to continue with PT and will progress as able in upcoming visits with ROM, stretching, strengthening, flexibility, posture, HEP as per protocol.          Understanding of Dx/Px/POC: good     Prognosis: good    Goals  STG to be achieved in 4 weeks     1. Pt will reduce subjective pain rating by at least 50 percent the help facilitate return  to PLOF  Progressing   2. Pt will improve right shoulder PROM/ AROMby at least 10 degrees to help promote improved functional activity tolerance   Progressing   3. Pt will improve right shoulder MMT scores by at least 1/2 grade to promote improved functional activity tolerance   Progressing          LTG to be achieved in 6-8 weeks  1. Pt will be complaint with HEP  Progressing   2. Pt will improve Right shoulder AROM to WFL, to help facilitate independence with ADL's, IADL's, and functional activities   Progressing   3. Pt will improve right shoulder Strength to WFL to help facilitate independence with ADL's, IADL's, and functional activities   Progressing   4. Pt will have no limitations with ambulation to help facilitate independence at home and in the community.   Progressing   5. Pt will have no limitations with stair negotiation to help facilitate independence at home and in the community   Progressing           Plan  Patient would benefit from: skilled physical therapy    Planned therapy interventions: ADL training, balance, balance/weight bearing training, flexibility, functional ROM exercises, gait training, graded activity, graded exercise, graded motor, home exercise program, joint mobilization, manual therapy, neuromuscular re-education, patient education, postural training, strengthening, stretching, therapeutic activities and therapeutic exercise    Frequency: 2x-3x week  Duration in weeks: 12  Plan of Care beginning date: 5/6/2025  Plan of Care expiration date: 7/29/2025  Treatment plan discussed with: patient, PTA and referring physician        Subjective Evaluation    History of Present Illness  Mechanism of injury: 54 year old male who presents to outpatient PT with CC of right shoulder pain since 12/25. The patient reports he slipped and fell outside on soraya night, He fell on his left side, but reports increased pain in his right shoulder today. He reports localized pain to the lateral aspect  of the right shoulder, reports pain in the shoulder feels like a pulling deep pain. Denies radicular sx. Pain is exacerbated with reaching into Hz adduction. He does report occasional clicking/ popping in the shoulder. Goals: to gain more function in the shoulder.     UPDATE RE 25:  The patient is s/p same day R shoulder surgery on  by Dr. Walden at [facility].  He has complaints of pain.    He will be going back to see the doctor on for his follow up appointment.          Patient Goals  Patient goals for therapy: increased strength, decreased pain and increased motion  Patient goal: to gain more motion in the shoulder  Pain    RA   RA /2  RA    Current pain ratin  8  4  At best pain ratin  5  4  At worst pain ratin  8  6  Quality: pressure  Aggravating factors: lifting  Progression: worsening        Objective     Active Range of Motion     Right Shoulder           RA 3/3  RA /  RA   Flexion: 140 degrees           145   160   NT  Abduction: 90 degrees          100  105   NT  External rotation BTH: Active external rotation behind the head: occiput.    Occiput  C2  NT  Internal rotation BTB: Active internal rotation behind the back: Sacrum.    L5   L5  NT    Passive Range of Motion  R Shoulder  Flexion:  Abduction:  External Rotation:  Internal Rotation:    Strength/Myotome Testing     Right Shoulder        RA  RA /     RA    Planes of Motion   Flexion: 3-     3- 3/5     NT  Abduction: 2     2 2/5     NT  External rotation at 0°: 2   2 2/5     NT  Internal rotation at 0°: 2   2 2/5     NT    Tests     Right Shoulder   Positive active compression ([place]) and empty can.                 POC Expires Reeval for Medicare to be completed Unit LImit Auth Expiration Date PT/OT/STVisit Limit    By visit  10           Completed on                            VISIT TRACKER  DATE 4/7 4/10 4/14 4/17 4/21 4/24 4/28 5/6 2/27 4/2   Visit # 1 2 3 4 5 6 5 4 RE 9 RE   Remaining 9 8 7 6 5 4 3  "2 1              Precautions: HTN         Manuals 4/7 4/10 4/14 4/17 4/21 4/24 4/28 5/6 RE 2/27 4/2   PROM to right shoulder  RR TB MJC  MJC MJC MJC RR  MJC RR                                                                        Neuro Re-Ed                      MTP LTP GTB  5\" 2x10 GTB  5\" 2x10 ea  Grn 5\"x30 Blue TB  5\" x20 ea Blue  5\" x20 ea Blue  5\" x20  ea Blue   5\" x20  Blue   5\" x20                                                                                                                                                                                                   Green   5\" x20    TB ER IR  GTB  2x10 ea GTB 2x10 ea Green 5\"x 30 Blue  5\" x20 Blue  5''20x Blue  X20  Blue  x20  Blue TB  IR  X20     ER X10 Blue  X10 no resistance    Green  TB  IR  X20     ER X10 Blue  X10 no resistance   B/L TB ER                       Supine Cane  5\" x10  5\" x10 Flex 10\"x10 5\" x10 5''10x 5\" x10 5\" x10  5\" x10 5\" x10   Compass                      Supine ABC                       UBE for postural correction 5/5'  120 rpm    5/5'  120 rpm 5'/5' 120 5'/5'  120 rpm 5'/5' 100  rpm 6'/6'  120 rpm 6'/6'  120 rpm  6'/6'  120 rpm 6'/6'  120 rpm                                                   Ther Ex                       Pulley  5\" x20  Flex/scap 5\" x20  Flex/scap 5''20x flexion scaption  5\" x20 flex/  scap 5\" x20 flex/  scap 5\"x20     Flex/  scap 5\"x20     Flex/  scap  5\" x20  Flex/scap 5\" x20  Flex/scap   Finger ladder  5\" x20  Flex/scap 5\" x20  Flex/scap 5''10x flex scap  5\" x10 flex/scap 5\" x10 flex/scap 5\" x10  Flex/ scap 5\" x10  Flex/ scap  5\" x10  Flex/ scap 5\" x10  Flex/ scap   Upper trap stretch                        Levator stretch                                                                                                                        Ther Activity                                                                       Gait Training                                                                     "   Modalities                       CP R shoulder     10 xmin 10 min MHPX10 min X10 min      X10 min

## 2025-04-30 NOTE — INTERVAL H&P NOTE
H&P reviewed. After examining the patient I find no changes in the patients condition since the H&P had been written.    Vitals:    04/30/25 1010   BP: (!) 171/89   Pulse: 91   Resp: 18   Temp: 97.6 °F (36.4 °C)   SpO2: 99%

## 2025-04-30 NOTE — OP NOTE
OPERATIVE REPORT  PATIENT NAME: Vu Mcdaniels    :  1970  MRN: [id number]  Pt Location: [facility] OR ROOM 02    SURGERY DATE: 2025    Surgeons and Role:     * Leonid Walden, DO - Primary       * Clive Solis PA-C - Assisting    Preop Diagnosis:  Traumatic rupture of supraspinatus tendon of right shoulder, initial encounter [S46.231A]    Post-Op Diagnosis Codes:  Full-thickness rotator cuff tear right shoulder  Synovitis  Tear of glenoid labrum  Impingement    Procedure(s):  Right - RIGHT SHOULDER ARTHROSCOPY  Synovectomy  Debridement  Acromioplasty  Arthroscopic rotator cuff repair  Post arthroscopic injection of intra-articular joint space and peripheral portals    Specimen(s):  Shavings    Estimated Blood Loss:   Minimal    Drains:  None    Anesthesia Type:   General w/ Interscalene Block    Operative Indications:  Traumatic rupture of supraspinatus tendon of right shoulder, initial encounter [S46.757A]      Operative Findings:  TT: 0      Complications:   None    Procedure and Technique:    The patient was properly identified and brought into the operating room suite.  After successful induction of the general anesthetic, the patient was placed in the “beach chair” position.  All areas of possible skin pressurization were well padded to avoid the above. The right upper extremity neck and torso region were then prepped and draped in the usual sterile fashion.      It was medically necessary that the physician assistant be in the room to aid in positioning and placing the appropriate amount of retraction on the patient.  A qualified resident was not available.  The physician assistant's role was very integral to success of this case.  He assisted in positioning, draping, retraction soft tissue, assisted with dynamically maneuvering the arthroscope, assisted with repair of the rotator cuff, closure of the wound, and placement of the immobilizer     The surgical landmarks were outlined with a  skin marker.  All the portals were infiltrated with 0.5% Marcaine with epinephrine.  Using a  #11.  Scalpel blade a posterior portal was made approximately 1-1/2 cm medial and inferior to the posterior lateral corner of the acromion. The  arthroscope was then introduced into the glenohumeral joint space. There was a tremendous amount of synovial tissue present.  Using a “outside in” technique an accessory anterior inferior portal was made and a synovectomy was performed. There was a degenerative tear along the entire labrum which was debrided with a shaver.  There was some grade 2 arthritic changes along the humeral head.  The biceps tendon was probed and found to be intact.  The subscapularis tendon was found to be intact.  There was a full-thickness tear of the supraspinatus  tendon. This would be further evaluated from the superior viewing portal.     At this point, the arthroscope was then withdrawn from the glenohumeral joint space and introduced into the subacromial joint space.  An accessory lateral portal was made. A bursectomy was performed. Once this occurred, there is a full-thickness rotator cuff tear present.  It was confirmed with a grasper that there was adequate tissue quality.  Using the Arthrex suture anchor system,  1 suture anchors was placed in a horizontal mattress fashion anatomic reducing the rotator cuff.  At this point there was good tension on the cuff which was visualized using multiple arthroscopic portals.  The soft tissue on the undersurface of the acromion was then removed.  The coracoacromial ligament was then divided. At this point there was a large subacromial spur present.  Using a motorized bur, an acromioplasty 1st occur with the bur in the lateral portal and then switched to posterior portal to move any spur that was remaining.  At this point, there was adequate decompression of the subacromial joint space.     The shoulder was then copiously irrigated with sterile arthroscopic  solution. All the excess fluid was removed.  The portals were then closed with 3-0 nylon.  The subacromial joint space was injected with 0.5% Marcaine with epinephrine and dressed with ointment, Xeroform, 4x4s, ABDs, and tape.  A shoulder immobilizer with an abductor pillow was placed.  There was no complications during procedure. He was successfully woken, transferred to [facility], and went to recovery room in stable condition.        I was present for the entire procedure., A qualified resident physician was not available., and A physician assistant was required during the procedure for retraction, tissue handling, dissection and suturing.    Patient Disposition:  PACU              SIGNATURE: Leonid Walden,   DATE: April 30, 2025  TIME: 12:50 PM

## 2025-04-30 NOTE — ANESTHESIA POSTPROCEDURE EVALUATION
Post-Op Assessment Note    CV Status:  Stable    Pain management: satisfactory to patient       Mental Status:  Awake and sleepy   Hydration Status:  Euvolemic and stable   PONV Controlled:  None   Airway Patency:  Patent     Post Op Vitals Reviewed: Yes    No anethesia notable event occurred.    Staff: Anesthesiologist, CRNA           Last Filed PACU Vitals:  Vitals Value Taken Time   Temp 98    Pulse 81 04/30/25 1422   /68    Resp 21 04/30/25 1422   SpO2 98 % 04/30/25 1422   Vitals shown include unfiled device data.

## 2025-05-01 NOTE — ANESTHESIA POSTPROCEDURE EVALUATION
Post-Op Assessment Note    CV Status:  Stable  Pain Score: 0    Pain management: adequate       Mental Status:  Alert and awake   Hydration Status:  Euvolemic   PONV Controlled:  Controlled   Airway Patency:  Patent     Post Op Vitals Reviewed: Yes    No anethesia notable event occurred.    Staff: Anesthesiologist, with CRNAs           Last Filed PACU Vitals:  Vitals Value Taken Time   Temp 98 °F (36.7 °C) 04/30/25 1420   Pulse 84 04/30/25 1510   /72 04/30/25 1506   Resp 22 04/30/25 1510   SpO2 95 % 04/30/25 1510   Vitals shown include unfiled device data.    Modified Reginaldo:     Vitals Value Taken Time   Activity 2 04/30/25 1505   Respiration 2 04/30/25 1505   Circulation 2 04/30/25 1505   Consciousness 2 04/30/25 1505   Oxygen Saturation 2 04/30/25 1505     Modified Reginaldo Score: 10

## 2025-05-03 ENCOUNTER — HOSPITAL ENCOUNTER (EMERGENCY)
Facility: HOSPITAL | Age: 55
Discharge: HOME | End: 2025-05-03
Attending: INTERNAL MEDICINE
Payer: COMMERCIAL

## 2025-05-03 VITALS
SYSTOLIC BLOOD PRESSURE: 120 MMHG | OXYGEN SATURATION: 96 % | HEART RATE: 77 BPM | DIASTOLIC BLOOD PRESSURE: 69 MMHG | RESPIRATION RATE: 20 BRPM | TEMPERATURE: 98 F

## 2025-05-03 DIAGNOSIS — R55 SYNCOPE AND COLLAPSE: Primary | ICD-10-CM

## 2025-05-03 LAB
ALBUMIN SERPL BCG-MCNC: 4 G/DL (ref 3.5–5)
ALP SERPL-CCNC: 69 U/L (ref 34–104)
ALT SERPL W P-5'-P-CCNC: 24 U/L (ref 7–52)
ANION GAP SERPL CALCULATED.3IONS-SCNC: 10 MMOL/L (ref 4–13)
AST SERPL W P-5'-P-CCNC: 15 U/L (ref 13–39)
ATRIAL RATE: 78 BPM
BASOPHILS # BLD AUTO: 0.09 THOUSANDS/ÂΜL (ref 0–0.1)
BASOPHILS NFR BLD AUTO: 1 % (ref 0–1)
BILIRUB SERPL-MCNC: 0.63 MG/DL (ref 0.2–1)
BUN SERPL-MCNC: 19 MG/DL (ref 5–25)
CALCIUM SERPL-MCNC: 9.2 MG/DL (ref 8.4–10.2)
CARDIAC TROPONIN I PNL SERPL HS: <2 NG/L (ref ?–50)
CHLORIDE SERPL-SCNC: 100 MMOL/L (ref 96–108)
CO2 SERPL-SCNC: 24 MMOL/L (ref 21–32)
CREAT SERPL-MCNC: 1.26 MG/DL (ref 0.6–1.3)
EOSINOPHIL # BLD AUTO: 0.14 THOUSAND/ÂΜL (ref 0–0.61)
EOSINOPHIL NFR BLD AUTO: 2 % (ref 0–6)
ERYTHROCYTE [DISTWIDTH] IN BLOOD BY AUTOMATED COUNT: 13.3 % (ref 11.6–15.1)
GFR SERPL CREATININE-BSD FRML MDRD: 63 ML/MIN/1.73SQ M
GLUCOSE SERPL-MCNC: 172 MG/DL (ref 65–140)
HCT VFR BLD AUTO: 45 % (ref 36.5–49.3)
HGB BLD-MCNC: 15 G/DL (ref 12–17)
IMM GRANULOCYTES # BLD AUTO: 0.05 THOUSAND/UL (ref 0–0.2)
IMM GRANULOCYTES NFR BLD AUTO: 1 % (ref 0–2)
LYMPHOCYTES # BLD AUTO: 1.31 THOUSANDS/ÂΜL (ref 0.6–4.47)
LYMPHOCYTES NFR BLD AUTO: 15 % (ref 14–44)
MCH RBC QN AUTO: 29.5 PG (ref 26.8–34.3)
MCHC RBC AUTO-ENTMCNC: 33.3 G/DL (ref 31.4–37.4)
MCV RBC AUTO: 89 FL (ref 82–98)
MONOCYTES # BLD AUTO: 0.75 THOUSAND/ÂΜL (ref 0.17–1.22)
MONOCYTES NFR BLD AUTO: 8 % (ref 4–12)
NEUTROPHILS # BLD AUTO: 6.67 THOUSANDS/ÂΜL (ref 1.85–7.62)
NEUTS SEG NFR BLD AUTO: 73 % (ref 43–75)
NRBC BLD AUTO-RTO: 0 /100 WBCS
P AXIS: 42 DEGREES
PLATELET # BLD AUTO: 260 THOUSANDS/UL (ref 149–390)
PMV BLD AUTO: 9.3 FL (ref 8.9–12.7)
POTASSIUM SERPL-SCNC: 3.3 MMOL/L (ref 3.5–5.3)
PR INTERVAL: 162 MS
PROT SERPL-MCNC: 7 G/DL (ref 6.4–8.4)
QRS AXIS: -24 DEGREES
QRSD INTERVAL: 96 MS
QT INTERVAL: 394 MS
QTC INTERVAL: 449 MS
RBC # BLD AUTO: 5.08 MILLION/UL (ref 3.88–5.62)
SODIUM SERPL-SCNC: 134 MMOL/L (ref 135–147)
T WAVE AXIS: 38 DEGREES
VENTRICULAR RATE: 78 BPM
WBC # BLD AUTO: 9.01 THOUSAND/UL (ref 4.31–10.16)

## 2025-05-03 PROCEDURE — 36415 COLL VENOUS BLD VENIPUNCTURE: CPT | Performed by: INTERNAL MEDICINE

## 2025-05-03 PROCEDURE — 85025 COMPLETE CBC W/AUTO DIFF WBC: CPT | Performed by: INTERNAL MEDICINE

## 2025-05-03 PROCEDURE — 99284 EMERGENCY DEPT VISIT MOD MDM: CPT | Performed by: INTERNAL MEDICINE

## 2025-05-03 PROCEDURE — 84484 ASSAY OF TROPONIN QUANT: CPT | Performed by: INTERNAL MEDICINE

## 2025-05-03 PROCEDURE — 93005 ELECTROCARDIOGRAM TRACING: CPT

## 2025-05-03 PROCEDURE — 80053 COMPREHEN METABOLIC PANEL: CPT | Performed by: INTERNAL MEDICINE

## 2025-05-03 PROCEDURE — 99284 EMERGENCY DEPT VISIT MOD MDM: CPT

## 2025-05-03 RX ORDER — POTASSIUM CHLORIDE 1500 MG/1
20 TABLET, EXTENDED RELEASE ORAL ONCE
Status: COMPLETED | OUTPATIENT
Start: 2025-05-03 | End: 2025-05-03

## 2025-05-03 RX ADMIN — POTASSIUM CHLORIDE 20 MEQ: 1500 TABLET, EXTENDED RELEASE ORAL at 18:50

## 2025-05-03 NOTE — DISCHARGE INSTRUCTIONS
Recommend trying to avoid opioids for pain if pain is severe may be just taking a half a tablet.  Also recommended eating regular meals at this time.

## 2025-05-03 NOTE — ED PROVIDER NOTES
ED Disposition       None          Assessment & Plan       Medical Decision Making  54-year-old male accompanied by his wife presents status post syncopal episode less than 72 hours after shoulder surgery.  Patient just feels kind of foggy and weak at this time, however is no specific complaints physical examination is unremarkable lab studies were unremarkable potassium was slightly low.  Reviewed diet and medication therapy with the patient.  Told to follow-up with his PCP and Ortho.    Amount and/or Complexity of Data Reviewed  Labs: ordered.    Risk  Prescription drug management.             Medications - No data to display    ED Risk Strat Scores                    No data recorded        SBIRT 22yo+      Flowsheet Row Most Recent Value   Initial Alcohol Screen: US AUDIT-C     1. How often do you have a drink containing alcohol? 0 Filed at: 05/03/2025 1702   2. How many drinks containing alcohol do you have on a typical day you are drinking?  0 Filed at: 05/03/2025 1702   3a. Male UNDER 65: How often do you have five or more drinks on one occasion? 0 Filed at: 05/03/2025 1702   Audit-C Score 0 Filed at: 05/03/2025 1702   JANINE: How many times in the past year have you...    Used an illegal drug or used a prescription medication for non-medical reasons? Never Filed at: 05/03/2025 1702                            History of Present Illness       Chief Complaint   Patient presents with    Syncope     Patient had rotator cuff surgery on Wed and has been taking hydrocodone for pain which has been making him lightheaded. Patient had a syncopal episode this afternoon at approx 1530 today.        Past Medical History:   Diagnosis Date    Colon polyp     CPAP (continuous positive airway pressure) dependence     compliant    Hypercholesterolemia     Hypertension     Prediabetes     Sleep apnea       Past Surgical History:   Procedure Laterality Date    COLONOSCOPY      FL INJECTION RIGHT SHOULDER (ARTHROGRAM)  03/17/2025     HERNIA REPAIR Bilateral     [facility] SURGICAL ARTHROSCOPY SHOULDER W/ROTATOR CUFF RPR Right 4/30/2025    Procedure: Right - RIGHT SHOULDER ARTHROSCOPY Synovectomy Debridement Acromioplasty Arthroscopic rotator cuff repair Post arthroscopic injection of intra-articular joint space and peripheral portals;  Surgeon: Leonid Walden DO;  Location: [facility];  Service: Orthopedics    REPAIR LABRUM Right       Family History   Problem Relation Age of Onset    Graves' disease Mother       Social History     Tobacco Use    Smoking status: Some Days     Types: Cigars     Passive exposure: Never    Smokeless tobacco: Never    Tobacco comments:     Smokes cigar on occasion   Vaping Use    Vaping status: Never Used   Substance Use Topics    Alcohol use: Yes     Alcohol/week: 1.0 standard drink of alcohol     Types: 1 Standard drinks or equivalent per week     Comment: social    Drug use: Never      E-Cigarette/Vaping    E-Cigarette Use Never User       E-Cigarette/Vaping Substances    Nicotine No     THC No     CBD No     Flavoring No     Other No     Unknown No       I have reviewed and agree with the history as documented.     54-year-old male accompanied by his wife presents status post a described syncopal episode approximately 3:34 this afternoon.  The patient states that he had rotator cuff repair surgery done this past Thursday.  Patient states last evening he was given hydrocodone at about 8/8/1930 went to bed and slept through the night till about 6:00 this morning, he states he awakened wanted to get his bandage off his wife was helping him in the bathroom they said he was very warm and there had not eaten anything the patient is diabetic with hypertension, and felt very dizzy lightheaded this was around 3:00 4:00 this afternoon and felt as if he was get a pass out sat on the toilet his wife stated his eyes rolled back and he fainted or passed out for approximately 3 to 5 seconds.  He reported no chest pain chest  pressure shortness of breath no blurred vision loss of vision slurred speech no hemiparesis.  Patient states he had been given Percocet years ago and after taking it he passed out.  However he took the hydrocodone at approximately 8:00 this morning and then passed out till approximately 3 or 4:00 this afternoon, he also had not eaten much today.  However on the ambulance ride in his glucometer reading was approximately 170, when he awakened this morning it was approximately 140.  He states the only time he is ever been given any kind of opioid he has not tolerated well and has passed out.  This appears to happen to previous times.  Patient states he did tolerate Dilaudid for some reason in the hospital.        Review of Systems   Constitutional: Negative.    HENT: Negative.     Eyes: Negative.    Respiratory: Negative.     Cardiovascular: Negative.    Gastrointestinal: Negative.    Musculoskeletal: Negative.    Neurological:  Positive for syncope and light-headedness. Negative for dizziness, tremors, seizures, facial asymmetry, speech difficulty, weakness, numbness and headaches.   Hematological: Negative.    Psychiatric/Behavioral: Negative.             Objective       ED Triage Vitals   Temperature Pulse Blood Pressure Respirations SpO2 Patient Position - Orthostatic VS   05/03/25 1703 05/03/25 1701 05/03/25 1701 05/03/25 1701 05/03/25 1701 05/03/25 1701   97.8 °F (36.6 °C) 80 134/72 16 99 % Sitting      Temp Source Heart Rate Source BP Location FiO2 (%) Pain Score    05/03/25 1703 05/03/25 1701 05/03/25 1701 -- 05/03/25 1701    Temporal Monitor Left arm  No Pain      Vitals      Date and Time Temp Pulse SpO2 Resp BP Pain Score FACES Pain Rating User   05/03/25 1703 97.8 °F (36.6 °C) -- -- -- -- -- -- SG   05/03/25 1701 -- 80 99 % 16 134/72 No Pain -- SG            Physical Exam  Vitals and nursing note reviewed. Exam conducted with a chaperone present (Spouse).   Constitutional:       General: He is not in acute  distress.     Appearance: Normal appearance. He is normal weight. He is not ill-appearing.   HENT:      Head: Normocephalic and atraumatic.   Eyes:      Extraocular Movements: Extraocular movements intact.      Conjunctiva/sclera: Conjunctivae normal.      Pupils: Pupils are equal, round, and reactive to light.   Cardiovascular:      Rate and Rhythm: Normal rate and regular rhythm.      Pulses: Normal pulses.   Pulmonary:      Effort: Pulmonary effort is normal.      Breath sounds: Normal breath sounds.   Abdominal:      General: Abdomen is flat. Bowel sounds are normal.      Palpations: Abdomen is soft.   Musculoskeletal:         General: Deformity present. No swelling, tenderness or signs of injury. Normal range of motion.      Cervical back: Normal range of motion and neck supple.      Comments: Patient is in a arm sling due to right shoulder surgery he sensation is normal capillary refill is normal left hand his grasp is 5/5 his pulses are equal bilaterally.  He is unable to elevate the shoulder due to instructions not to however the rest of his neuro examination is completely unremarkable lower extremities normal mass tone sensation DTRs are equal and symmetric capillary refill pulses are intact Babinski's are downgoing, 5/5 strength in both lower extremities, and left upper extremity full range of motion normal examination.   Neurological:      General: No focal deficit present.      Mental Status: He is alert and oriented to person, place, and time.      Cranial Nerves: No cranial nerve deficit.      Sensory: No sensory deficit.      Motor: No weakness.      Coordination: Coordination normal.   Psychiatric:         Mood and Affect: Mood normal.         Behavior: Behavior normal.         Thought Content: Thought content normal.         Judgment: Judgment normal.         Results Reviewed       None            No orders to display       ECG 12 Lead Documentation Only    Date/Time: 5/3/2025 6:44 PM    Performed  by: Buzz Butler MD  Authorized by: Buzz Butler MD    Indications / Diagnosis:  Syncope  ECG reviewed by me, the ED Provider: yes    Patient location:  ED  Previous ECG:     Previous ECG:  Compared to current    Similarity:  No change  Interpretation:     Interpretation: normal    Rate:     ECG rate:  75-80    ECG rate assessment: normal    Rhythm:     Rhythm: sinus rhythm    Ectopy:     Ectopy: none    QRS:     QRS axis:  Normal  Conduction:     Conduction: normal    ST segments:     ST segments:  Normal  T waves:     T waves: normal        ED Medication and Procedure Management   Prior to Admission Medications   Prescriptions Last Dose Informant Patient Reported? Taking?   Alcohol Swabs (Alcohol Prep Pads) 70 % PADS  Self No No   Sig: CHECK BLOOD SUGARS TWICE DAILY.   Blood Glucose Monitoring Suppl (OneTouch Verio Reflect) w/Device KIT  Self No No   Sig: Check blood sugars twice daily. Please substitute with appropriate alternative as covered by patient's insurance. Dx: E11.65   Dulaglutide (Trulicity) 1.5 MG/0.5ML SOAJ   No No   Sig: Inject 1.5 mg under the skin once a week   HYDROcodone-acetaminophen (NORCO) 5-325 mg per tablet   No No   Sig: Take 1 tablet by mouth every 6 (six) hours as needed for pain for up to 10 days Max Daily Amount: 4 tablets   OneTouch Delica Lancets 33G MISC  Self No No   Sig: Check blood sugars twice daily. Please substitute with appropriate alternative as covered by patient's insurance. Dx: E11.65   aspirin 325 mg tablet   Yes No   Sig: Take 325 mg by mouth daily   atorvastatin (LIPITOR) 40 mg tablet   No No   Sig: TAKE 1 TABLET (40 MG TOTAL) BY MOUTH DAILY   glucose blood (OneTouch Verio) test strip  Self No No   Sig: Check blood sugars twice daily. Please substitute with appropriate alternative as covered by patient's insurance. Dx: E11.65   losartan-hydrochlorothiazide (HYZAAR) 100-25 MG per tablet   No No   Sig: Take 1 tablet by mouth daily   meloxicam (MOBIC) 15 mg tablet    No No   Sig: Take 1 tablet (15 mg total) by mouth daily   metFORMIN (GLUCOPHAGE-XR) 500 mg 24 hr tablet  Self No No   Sig: Take 2 tablets (1,000 mg total) by mouth daily with dinner   polyethylene glycol (GOLYTELY) 4000 mL solution   No No   Sig: Take 4,000 mL by mouth once for 1 dose Follow GI office instructions      Facility-Administered Medications: None     Patient's Medications   Discharge Prescriptions    No medications on file     No discharge procedures on file.  ED SEPSIS DOCUMENTATION            Buzz Butler MD  05/03/25 0091

## 2025-05-05 ENCOUNTER — APPOINTMENT (OUTPATIENT)
Dept: PHYSICAL THERAPY | Facility: CLINIC | Age: 55
End: 2025-05-05
Payer: COMMERCIAL

## 2025-05-05 ENCOUNTER — TELEPHONE (OUTPATIENT)
Age: 55
End: 2025-05-05

## 2025-05-05 LAB
ATRIAL RATE: 78 BPM
P AXIS: 42 DEGREES
PR INTERVAL: 162 MS
QRS AXIS: -24 DEGREES
QRSD INTERVAL: 96 MS
QT INTERVAL: 394 MS
QTC INTERVAL: 449 MS
T WAVE AXIS: 38 DEGREES
VENTRICULAR RATE: 78 BPM

## 2025-05-05 PROCEDURE — 93010 ELECTROCARDIOGRAM REPORT: CPT | Performed by: INTERNAL MEDICINE

## 2025-05-05 NOTE — TELEPHONE ENCOUNTER
Caller: Patient    Doctor/Office: Dr Walden    Call regarding :  Patient called had surgery 4/30 stated took meloxicam and hydrocodone medication 5/03 in the morning and passed out in the afternoon, patient was seen in ED 5/03. Patient stated today he felt like he was going to pass out again.     Call was transferred to: Ortho Pod Nurse

## 2025-05-05 NOTE — TELEPHONE ENCOUNTER
Received transfer call from pt. He passed out on 5/3/25 and was in the ER. They did not find anything wrong and sent him home. Today he went to get cleaned up again and his wife stated he started to look very pale again and made him stop. He said when he got up he felt a little dizzy/lightheaded. His blood sugar was 141 his BP was 134/87 and his HR was 89. He hasn't taken his Norco or Meloxicam since yesterday morning. He is eating and drinking, having no pain. He is not sure what is bringing on these episodes every time he goes to get cleaned up. Please review and advise.

## 2025-05-05 NOTE — TELEPHONE ENCOUNTER
Called and spoke with pt and relayed Dr Walden's message, he stated understanding, no further questions.

## 2025-05-06 ENCOUNTER — APPOINTMENT (OUTPATIENT)
Dept: PHYSICAL THERAPY | Facility: CLINIC | Age: 55
End: 2025-05-06
Attending: PHYSICIAN ASSISTANT
Payer: COMMERCIAL

## 2025-05-08 ENCOUNTER — APPOINTMENT (OUTPATIENT)
Dept: PHYSICAL THERAPY | Facility: CLINIC | Age: 55
End: 2025-05-08
Payer: COMMERCIAL

## 2025-05-08 ENCOUNTER — OFFICE VISIT (OUTPATIENT)
Dept: FAMILY MEDICINE CLINIC | Facility: CLINIC | Age: 55
End: 2025-05-08
Payer: COMMERCIAL

## 2025-05-08 VITALS
SYSTOLIC BLOOD PRESSURE: 122 MMHG | HEIGHT: 67 IN | TEMPERATURE: 96.6 F | OXYGEN SATURATION: 98 % | WEIGHT: 264 LBS | HEART RATE: 88 BPM | BODY MASS INDEX: 41.44 KG/M2 | DIASTOLIC BLOOD PRESSURE: 84 MMHG

## 2025-05-08 DIAGNOSIS — I10 HYPERTENSION, UNSPECIFIED TYPE: ICD-10-CM

## 2025-05-08 DIAGNOSIS — R55 SYNCOPE, UNSPECIFIED SYNCOPE TYPE: Primary | ICD-10-CM

## 2025-05-08 DIAGNOSIS — E11.69 TYPE 2 DIABETES MELLITUS WITH OTHER SPECIFIED COMPLICATION, UNSPECIFIED WHETHER LONG TERM INSULIN USE (HCC): ICD-10-CM

## 2025-05-08 DIAGNOSIS — E11.69 TYPE 2 DIABETES MELLITUS WITH OTHER SPECIFIED COMPLICATION, WITHOUT LONG-TERM CURRENT USE OF INSULIN (HCC): ICD-10-CM

## 2025-05-08 PROCEDURE — 99213 OFFICE O/P EST LOW 20 MIN: CPT | Performed by: STUDENT IN AN ORGANIZED HEALTH CARE EDUCATION/TRAINING PROGRAM

## 2025-05-08 RX ORDER — LOSARTAN POTASSIUM AND HYDROCHLOROTHIAZIDE 25; 100 MG/1; MG/1
1 TABLET ORAL DAILY
Qty: 30 TABLET | Refills: 5 | Status: SHIPPED | OUTPATIENT
Start: 2025-05-08

## 2025-05-08 RX ORDER — METFORMIN HYDROCHLORIDE 500 MG/1
1000 TABLET, EXTENDED RELEASE ORAL
Qty: 60 TABLET | Refills: 2 | Status: SHIPPED | OUTPATIENT
Start: 2025-05-08 | End: 2025-08-06

## 2025-05-08 RX ORDER — ATORVASTATIN CALCIUM 40 MG/1
40 TABLET, FILM COATED ORAL DAILY
Qty: 100 TABLET | Refills: 1 | Status: SHIPPED | OUTPATIENT
Start: 2025-05-08

## 2025-05-08 RX ORDER — DULAGLUTIDE 1.5 MG/.5ML
1.5 INJECTION, SOLUTION SUBCUTANEOUS WEEKLY
Qty: 4 ML | Refills: 0 | Status: SHIPPED | OUTPATIENT
Start: 2025-05-08

## 2025-05-08 NOTE — ASSESSMENT & PLAN NOTE
Lab Results   Component Value Date    HGBA1C 7.1 (A) 03/25/2025       Orders:    metFORMIN (GLUCOPHAGE-XR) 500 mg 24 hr tablet; Take 2 tablets (1,000 mg total) by mouth daily with dinner    atorvastatin (LIPITOR) 40 mg tablet; Take 1 tablet (40 mg total) by mouth daily

## 2025-05-08 NOTE — ASSESSMENT & PLAN NOTE
Lab Results   Component Value Date    HGBA1C 7.1 (A) 03/25/2025       Orders:    Dulaglutide (Trulicity) 1.5 MG/0.5ML SOAJ; Inject 1.5 mg under the skin once a week

## 2025-05-08 NOTE — PROGRESS NOTES
Name: Vu Mcdaniels      : 1970      MRN: [id number]  Encounter Provider: Markos Yousif MD  Encounter Date: 2025   Encounter department: [facility]  :  Assessment & Plan  Syncope, unspecified syncope type  Notes reviewed  Unclear etiology possibly polypharmacy?  Medication side effect?  The ER looks like his sugar slightly elevated but there is no evidence of WALTER although his GFR is slightly decreased  Will repeat baseline labs to ensure his sugar and kidney function is not contributing to these possible episodes.    However I still feel this is likely either vasovagal in nature or secondary to complications from his pain management/recent anesthesia  Orders:    CBC and differential; Future    Comprehensive metabolic panel; Future    Hypertension, unspecified type    Orders:    losartan-hydrochlorothiazide (HYZAAR) 100-25 MG per tablet; Take 1 tablet by mouth daily    Type 2 diabetes mellitus with other specified complication, unspecified whether long term insulin use ([facility])    Lab Results   Component Value Date    HGBA1C 7.1 (A) 2025       Orders:    metFORMIN (GLUCOPHAGE-XR) 500 mg 24 hr tablet; Take 2 tablets (1,000 mg total) by mouth daily with dinner    atorvastatin (LIPITOR) 40 mg tablet; Take 1 tablet (40 mg total) by mouth daily    Type 2 diabetes mellitus with other specified complication, without long-term current use of insulin ([facility])    Lab Results   Component Value Date    HGBA1C 7.1 (A) 2025       Orders:    Dulaglutide (Trulicity) 1.5 MG/0.5ML SOAJ; Inject 1.5 mg under the skin once a week           History of Present Illness   HPI    This is a 54-year-old male who presents to the office after undergoing a syncopal episode on 5/3/2025.  Patient did recently have surgery secondary to a rotator cuff repair on 2025, he awoke on 5/3/20/2025 and had a witnessed syncopal episode.  In the emergency department his workup was unremarkable and was  "thought to be secondary to side effects from his Valentine.  Unfortunately patient states he continues to feel weak and his wife reports he is pale since this episode.  Patient is he spoke to his orthopedist who advised him \"follow-up with your PCP \".  Patient states he is eating and drinking at baseline still continuing to have normal bowel movements.  States he is no longer taking his prescription meloxicam and Norco which was prescribed by his surgeon.    Review of Systems   Constitutional:  Negative for activity change, appetite change, chills, fatigue and fever.   HENT:  Negative for congestion, dental problem, drooling, ear discharge, ear pain, facial swelling, postnasal drip, rhinorrhea and sinus pain.    Eyes:  Negative for photophobia, pain, discharge and itching.   Respiratory:  Negative for apnea, cough, chest tightness and shortness of breath.    Cardiovascular:  Negative for chest pain and leg swelling.   Gastrointestinal:  Negative for abdominal distention, abdominal pain, anal bleeding, constipation, diarrhea and nausea.   Endocrine: Negative for cold intolerance, heat intolerance and polydipsia.   Genitourinary:  Negative for difficulty urinating.   Musculoskeletal:  Negative for arthralgias, gait problem, joint swelling and myalgias.   Skin:  Negative for color change and pallor.   Allergic/Immunologic: Negative for immunocompromised state.   Neurological:  Positive for weakness. Negative for dizziness, seizures, facial asymmetry, light-headedness, numbness and headaches.   Psychiatric/Behavioral:  Negative for agitation, behavioral problems, confusion, decreased concentration and dysphoric mood.    All other systems reviewed and are negative.      Objective   /84 (BP Location: Left arm, Patient Position: Sitting, Cuff Size: Large)   Pulse 88   Temp (!) 96.6 °F (35.9 °C) (Tympanic)   Ht 5' 7\" (1.702 m)   Wt 120 kg (264 lb)   SpO2 98%   BMI 41.35 kg/m²      Physical Exam  Constitutional:      "  Appearance: He is well-developed.   HENT:      Head: Normocephalic.   Eyes:      Pupils: Pupils are equal, round, and reactive to light.   Cardiovascular:      Rate and Rhythm: Normal rate and regular rhythm.   Pulmonary:      Effort: Pulmonary effort is normal.      Breath sounds: Normal breath sounds.   Abdominal:      General: Bowel sounds are normal.      Palpations: Abdomen is soft.   Musculoskeletal:         General: Normal range of motion.      Cervical back: Normal range of motion and neck supple.      Comments: Right shoulder not examined as it is in sling however does not appear grossly abnormal.  Sensation motor function to the right hand.   Skin:     General: Skin is warm.

## 2025-05-12 ENCOUNTER — APPOINTMENT (OUTPATIENT)
Dept: PHYSICAL THERAPY | Facility: CLINIC | Age: 55
End: 2025-05-12
Attending: PHYSICIAN ASSISTANT
Payer: COMMERCIAL

## 2025-05-13 ENCOUNTER — OFFICE VISIT (OUTPATIENT)
Dept: OBGYN CLINIC | Facility: CLINIC | Age: 55
End: 2025-05-13

## 2025-05-13 ENCOUNTER — APPOINTMENT (OUTPATIENT)
Dept: RADIOLOGY | Facility: CLINIC | Age: 55
End: 2025-05-13
Attending: ORTHOPAEDIC SURGERY
Payer: COMMERCIAL

## 2025-05-13 VITALS — WEIGHT: 264 LBS | HEIGHT: 67 IN | BODY MASS INDEX: 41.44 KG/M2

## 2025-05-13 DIAGNOSIS — Z98.890 S/P ARTHROSCOPY OF RIGHT SHOULDER: Primary | ICD-10-CM

## 2025-05-13 DIAGNOSIS — Z98.890 S/P ARTHROSCOPY OF RIGHT SHOULDER: ICD-10-CM

## 2025-05-13 PROCEDURE — 99024 POSTOP FOLLOW-UP VISIT: CPT | Performed by: ORTHOPAEDIC SURGERY

## 2025-05-13 PROCEDURE — 73030 X-RAY EXAM OF SHOULDER: CPT

## 2025-05-13 NOTE — PROGRESS NOTES
Assessment & Plan  S/P arthroscopy of right shoulder  X-rays of the patient's right shoulder show adequate decompression of the subacromial joint space.  The patient continue to use the shoulder immobilizer.  She also continue to participate in physical therapy.  He will follow-up with our office in 1 month for strength and motion check.  He is acceptable to this plan.  Orders:    XR shoulder 2+ vw right; Future          The patient is doing quite well from his right shoulder arthroscopy and rotator cuff repair.  Strength and motion not tested.  X-rays show anatomic decompression of subacromial joint space.  Continue passive range of motion program.  Continue immobilizer.  Return back 1 month for strength and motion check    Return in about 1 month (around 6/13/2025).      _____________________________________________________  CHIEF COMPLAINT:  Chief Complaint   Patient presents with    Right Shoulder - Post-op         SUBJECTIVE:  Vu Mcdaniels is a 54 y.o. male who presents to our office for postop visit.  The patient is status post right shoulder arthroscopy with rotator cuff repair.  The patient has been using the shoulder immobilizer.  He states he has not been performing active range of motion.  He has been participating in physical therapy.  Denies any numbness or tingling.  He denies any fever or chills.  He states that his pain has been well-controlled.    The following portions of the patient's history were reviewed and updated as appropriate: allergies, current medications, past family history, past medical history, past social history, past surgical history and problem list.    PAST MEDICAL HISTORY:  Past Medical History:   Diagnosis Date    Colon polyp     CPAP (continuous positive airway pressure) dependence     compliant    Hypercholesterolemia     Hypertension     Prediabetes     Sleep apnea        PAST SURGICAL HISTORY:  Past Surgical History:   Procedure Laterality Date    COLONOSCOPY      FL  INJECTION RIGHT SHOULDER (ARTHROGRAM)  03/17/2025    HERNIA REPAIR Bilateral     [place] SURGICAL ARTHROSCOPY SHOULDER W/ROTATOR CUFF RPR Right 4/30/2025    Procedure: Right - RIGHT SHOULDER ARTHROSCOPY Synovectomy Debridement Acromioplasty Arthroscopic rotator cuff repair Post arthroscopic injection of intra-articular joint space and peripheral portals;  Surgeon: Leonid Walden DO;  Location: [facility];  Service: Orthopedics    REPAIR LABRUM Right        FAMILY HISTORY:  Family History   Problem Relation Age of Onset    Graves' disease Mother        SOCIAL HISTORY:  Social History     Tobacco Use    Smoking status: Some Days     Types: Cigars     Passive exposure: Never    Smokeless tobacco: Never    Tobacco comments:     Smokes cigar on occasion   Vaping Use    Vaping status: Never Used   Substance Use Topics    Alcohol use: Yes     Alcohol/week: 1.0 standard drink of alcohol     Types: 1 Standard drinks or equivalent per week     Comment: social    Drug use: Never       MEDICATIONS:    Current Outpatient Medications:     Alcohol Swabs (Alcohol Prep Pads) 70 % PADS, CHECK BLOOD SUGARS TWICE DAILY., Disp: 200 each, Rfl: 0    atorvastatin (LIPITOR) 40 mg tablet, Take 1 tablet (40 mg total) by mouth daily, Disp: 100 tablet, Rfl: 1    Blood Glucose Monitoring Suppl (OneTouch Verio Reflect) w/Device KIT, Check blood sugars twice daily. Please substitute with appropriate alternative as covered by patient's insurance. Dx: E11.65, Disp: 1 kit, Rfl: 0    Dulaglutide (Trulicity) 1.5 MG/0.5ML SOAJ, Inject 1.5 mg under the skin once a week, Disp: 4 mL, Rfl: 0    glucose blood (OneTouch Verio) test strip, Check blood sugars twice daily. Please substitute with appropriate alternative as covered by patient's insurance. Dx: E11.65, Disp: 200 each, Rfl: 3    losartan-hydrochlorothiazide (HYZAAR) 100-25 MG per tablet, Take 1 tablet by mouth daily, Disp: 30 tablet, Rfl: 5    metFORMIN (GLUCOPHAGE-XR) 500 mg 24 hr tablet, Take 2  "tablets (1,000 mg total) by mouth daily with dinner, Disp: 60 tablet, Rfl: 2    OneTouch Delica Lancets 33G MISC, Check blood sugars twice daily. Please substitute with appropriate alternative as covered by patient's insurance. Dx: E11.65, Disp: 200 each, Rfl: 3    aspirin 325 mg tablet, Take 325 mg by mouth daily (Patient not taking: Reported on 5/13/2025), Disp: , Rfl:     meloxicam (MOBIC) 15 mg tablet, Take 1 tablet (15 mg total) by mouth daily (Patient not taking: Reported on 5/13/2025), Disp: 30 tablet, Rfl: 0    polyethylene glycol (GOLYTELY) 4000 mL solution, Take 4,000 mL by mouth once for 1 dose Follow GI office instructions, Disp: 4000 mL, Rfl: 0    ALLERGIES:  Allergies   Allergen Reactions    Oxycodone-Acetaminophen Syncope     feint  Pt passes out         ROS:  Review of Systems     Constitutional: Negative for fatigue, fever or loss of appetite.   HENT: Negative.    Respiratory: Negative for shortness of breath, dyspnea.    Cardiovascular: Negative for chest pain/tightness.   Gastrointestinal: Negative for abdominal pain, N/V.   Endocrine: Negative for cold/heat intolerance, unexplained weight loss/gain.   Genitourinary: Negative for flank pain, dysuria, hematuria.   Musculoskeletal: Negative for arthralgia   Skin: Negative for rash.    Neurological: Negative for numbness or tingling  Psychiatric/Behavioral: Negative for agitation.  _____________________________________________________  PHYSICAL EXAMINATION:    Height 5' 7\" (1.702 m), weight 120 kg (264 lb).    Constitutional: Oriented to person, place, and time. Appears well-developed and well-nourished. No distress.   HENT:   Head: Normocephalic.   Eyes: Conjunctivae are normal. Right eye exhibits no discharge. Left eye exhibits no discharge. No scleral icterus.   Cardiovascular: Normal rate.    Pulmonary/Chest: Effort normal.   Neurological: Alert and oriented to person, place, and time.   Skin: Skin is warm and dry. No rash noted. Not diaphoretic. " "No erythema. No pallor.   Psychiatric: Normal mood and affect. Behavior is normal. Judgment and thought content normal.      MUSCULOSKELETAL EXAMINATION:   Physical Exam  Ortho Exam    Right upper extremity is neurovascularly intact  Fingers are pink and mobile  Compartments are soft  Incisions are clean, dry and intact  No warmth or erythema  Brisk cap refill  Sensation intact  Objective:  BP Readings from Last 1 Encounters:   05/08/25 122/84      Wt Readings from Last 1 Encounters:   05/13/25 120 kg (264 lb)        BMI:   Estimated body mass index is 41.35 kg/m² as calculated from the following:    Height as of this encounter: 5' 7\" (1.702 m).    Weight as of this encounter: 120 kg (264 lb).        Scribe Attestation      I,:  Clive Solis PA-C am acting as a scribe while in the presence of the attending physician.:       I,:  Leonid Walden, DO personally performed the services described in this documentation    as scribed in my presence.:            "

## 2025-05-14 ENCOUNTER — OFFICE VISIT (OUTPATIENT)
Dept: PHYSICAL THERAPY | Facility: CLINIC | Age: 55
End: 2025-05-14
Attending: PHYSICIAN ASSISTANT
Payer: COMMERCIAL

## 2025-05-14 VITALS — DIASTOLIC BLOOD PRESSURE: 76 MMHG | SYSTOLIC BLOOD PRESSURE: 117 MMHG | HEART RATE: 88 BPM

## 2025-05-14 DIAGNOSIS — Z98.890 S/P RIGHT ROTATOR CUFF REPAIR: Primary | ICD-10-CM

## 2025-05-14 PROCEDURE — 97112 NEUROMUSCULAR REEDUCATION: CPT | Performed by: PHYSICAL THERAPIST

## 2025-05-14 PROCEDURE — 97140 MANUAL THERAPY 1/> REGIONS: CPT | Performed by: PHYSICAL THERAPIST

## 2025-05-14 PROCEDURE — 97164 PT RE-EVAL EST PLAN CARE: CPT | Performed by: PHYSICAL THERAPIST

## 2025-05-14 NOTE — PROGRESS NOTES
PT Evaluation     Today's date: 2025  Patient name: Vu Mcdaniels  : 1970  MRN: [id number]  Referring provider: Clive Solis,*  Dx:   Encounter Diagnosis     ICD-10-CM    1. S/P right rotator cuff repair  Z98.890                      Assessment  Impairments: abnormal coordination, abnormal gait, abnormal muscle firing, abnormal muscle tone, abnormal or restricted ROM, activity intolerance, impaired physical strength, lacks appropriate home exercise program, pain with function, safety issue, weight-bearing intolerance and poor posture   Functional limitations: Difficulty with reaching at and above shoulder height  Symptom irritability: moderate    Assessment details: Vu Mcdaniels is a 54 y.o. male who presents to outpatient PT with a  S/P right rotator cuff repair DOS  (primary encounter diagnosis).  No further referral appears necessary at this time based upon examination results. Pt presents with decreased strength, ROM, balance, functional activity tolerance, and pain with movement in 2-3 which is  limiting his ability to perform the aforementioned functional activities.  Etiologic factors include repetitive poor body mechanics. Prognosis is good given HEP compliance and PT 2-3/wk.  Please contact me if you have any questions or recommendations.  Thank you for the opportunity to share in  [facility].     Understanding of Dx/Px/POC: good     Prognosis: good    Goals  STG to be achieved in 4 weeks     1. Pt will reduce subjective pain rating by at least 50 percent the help facilitate return to PLOF  2. Pt will improve right shoulder AROM by at least 10 degrees to help promote improved functional activity tolerance   3. Pt will improve Right shoulder MMT scores by at least 1/2 grade to promote improved functional activity tolerance        LTG to be achieved in 6-8 weeks   1. Pt will be complaint with HEP   2. Pt will improve right shoulder ROM to WFL, to help facilitate  independence with ADL's, IADL's, and functional activities   3. Pt will improve right shoulder Strength to WFL to help facilitate independence with ADL's, IADL's, and functional activities   4. Pt will have no limitations with reaching at and above shoulder height to help promote safe return to PLOF.         Plan  Patient would benefit from: skilled physical therapy    Planned therapy interventions: ADL training, balance, balance/weight bearing training, flexibility, functional ROM exercises, gait training, graded activity, graded exercise, graded motor, home exercise program, joint mobilization, manual therapy, neuromuscular re-education, patient education, postural training, strengthening, stretching, therapeutic activities and therapeutic exercise    Frequency: 2x week  Duration in weeks: 12  Plan of Care beginning date: 2025  Plan of Care expiration date: 2025  Treatment plan discussed with: patient, PTA and referring physician        Subjective Evaluation    History of Present Illness  Mechanism of injury: 54 year old male S/P right RTC repair Dr. Walden DOS      S/p right shoulder arthroscopy with rotator cuff repair   NO active motion x 6 weeks   Active assist   Passive assist    Doing well post operative. He does note a syncopal event which was contributed to anaesthesia. He as been cleared to resume PT By PCP and surgeon.         Patient Goals  Patient goals for therapy: increased strength, decreased pain and increased motion  Patient goal: to be  able to conquer worlds again.  Pain  Current pain ratin  At best pain ratin  At worst pain ratin  Progression: improved          Objective     Passive Range of Motion     Right Shoulder   Flexion: 120 degrees   Abduction: 90 degrees   External rotation 0°: 20 degrees   Internal rotation 0°: 20 degrees     Additional Passive Range of Motion Details  Strength NT this date     Surgical incision(s) inspected. Incision(s) clean, dry and intact  with no signs/symptoms of infection. Patient was educated on signs/symptoms of infection and was advised to contact MD immediately if suspect infection.                  Precautions: S/P right RTC repair       Manuals 5/14            PROM to right shoulder  120 flex 90 abd ER IR                                                    Neuro Re-Ed             Scap squeeze 5''20x HEP            Pendulum 30x HEP 4 dir            Elbow AROM              Digiflex              TB wrist bar              Isometric                          Ther Ex             UT Levator stretch              Alyx              Finger ladder                                                                               Ther Activity                                       Gait Training                                       Modalities

## 2025-05-15 ENCOUNTER — OFFICE VISIT (OUTPATIENT)
Dept: PHYSICAL THERAPY | Facility: CLINIC | Age: 55
End: 2025-05-15
Payer: COMMERCIAL

## 2025-05-15 DIAGNOSIS — S46.811A TRAUMATIC RUPTURE OF SUPRASPINATUS TENDON OF RIGHT SHOULDER, INITIAL ENCOUNTER: ICD-10-CM

## 2025-05-15 DIAGNOSIS — Z98.890 S/P RIGHT ROTATOR CUFF REPAIR: Primary | ICD-10-CM

## 2025-05-15 DIAGNOSIS — G89.29 CHRONIC RIGHT SHOULDER PAIN: ICD-10-CM

## 2025-05-15 DIAGNOSIS — M25.511 CHRONIC RIGHT SHOULDER PAIN: ICD-10-CM

## 2025-05-15 PROCEDURE — 97112 NEUROMUSCULAR REEDUCATION: CPT

## 2025-05-15 PROCEDURE — 97140 MANUAL THERAPY 1/> REGIONS: CPT

## 2025-05-15 PROCEDURE — 97110 THERAPEUTIC EXERCISES: CPT

## 2025-05-15 NOTE — PROGRESS NOTES
"Daily Note     Today's date: 5/15/2025  Patient name: Vu Mcdaniels  : 1970  MRN: [id number]  Referring provider: Clive Solis,*  Dx:   Encounter Diagnosis     ICD-10-CM    1. S/P right rotator cuff repair  Z98.890       2. Traumatic rupture of supraspinatus tendon of right shoulder, initial encounter  S46.811A       3. Chronic right shoulder pain  M25.511     G89.29                      Subjective: Pt denies significant pain R shoulder, does have difficulty sleeping.       Objective: See treatment diary below      Assessment: Tolerated treatment well. Initiated exercises as outlined in POC. Patient demonstrated fatigue post treatment, exhibited good technique with therapeutic exercises, and would benefit from continued PT as per MD RTC repair protocol.       Plan: Continue per plan of care.      Precautions: S/P right RTC repair       Manuals 5/14 5/15           PROM to right shoulder  120 flex 90 abd ER IR  120 flex 90 abd ER IR                                                   Neuro Re-Ed             Scap squeeze 5''20x HEP 5x20           Pendulum 30x HEP 4 dir X30 ea           Elbow AROM   2x10           Digiflex   Red   5\" x20           TB wrist bar   Red x20           Isometric                          Ther Ex             UT Levator stretch   15\" x4 ea           Pulley              Finger ladder                                                                               Ther Activity                                       Gait Training                                       Modalities                                                   "

## 2025-05-19 ENCOUNTER — OFFICE VISIT (OUTPATIENT)
Dept: PHYSICAL THERAPY | Facility: CLINIC | Age: 55
End: 2025-05-19
Payer: COMMERCIAL

## 2025-05-19 DIAGNOSIS — Z98.890 S/P RIGHT ROTATOR CUFF REPAIR: Primary | ICD-10-CM

## 2025-05-19 DIAGNOSIS — M25.511 CHRONIC RIGHT SHOULDER PAIN: ICD-10-CM

## 2025-05-19 DIAGNOSIS — S46.811A TRAUMATIC RUPTURE OF SUPRASPINATUS TENDON OF RIGHT SHOULDER, INITIAL ENCOUNTER: ICD-10-CM

## 2025-05-19 DIAGNOSIS — G89.29 CHRONIC RIGHT SHOULDER PAIN: ICD-10-CM

## 2025-05-19 PROCEDURE — 97140 MANUAL THERAPY 1/> REGIONS: CPT | Performed by: PHYSICAL THERAPIST

## 2025-05-19 PROCEDURE — 97112 NEUROMUSCULAR REEDUCATION: CPT | Performed by: PHYSICAL THERAPIST

## 2025-05-19 NOTE — PROGRESS NOTES
"Daily Note     Today's date: 2025  Patient name: Vu Mcdaniels  : 1970  MRN: [id number]  Referring provider: Clive Solis,*  Dx:   Encounter Diagnosis     ICD-10-CM    1. S/P right rotator cuff repair  Z98.890       2. Traumatic rupture of supraspinatus tendon of right shoulder, initial encounter  S46.811A       3. Chronic right shoulder pain  M25.511     G89.29                      Subjective: Pt states his right shoulder is a little sore today. Pain is localized to the posterior aspect of the right shoulder       Objective: See treatment diary below      Assessment: Tolerated treatment well. Patient exhibited good technique with therapeutic exercises and would benefit from continued PT Progressing well per protocol. Consider adding pulley and finger ladder next session.       Plan: Continue per plan of care.      Precautions: S/P right RTC repair       Manuals 5/14 5/15 5/19          PROM to right shoulder  120 flex 90 abd ER IR  120 flex 90 abd ER IR  120 flex 90 abd ER IR                                                  Neuro Re-Ed             Scap squeeze 5''20x HEP 5x20 5x20          Pendulum 30x HEP 4 dir X30 ea X30 ea          Elbow AROM   2x10 2x10          Digiflex   Red   5\" x20 Green  5\" x20          TB wrist bar   Red x20 [id number]          Isometric                          Ther Ex             UT Levator stretch   15\" x4 ea 15\" x4 ea          Pulley              Finger ladder                                                                               Ther Activity                                       Gait Training                                       Modalities             CP x 10 min    X 10 min no adverse effects                                      "

## 2025-05-22 ENCOUNTER — OFFICE VISIT (OUTPATIENT)
Dept: PHYSICAL THERAPY | Facility: CLINIC | Age: 55
End: 2025-05-22
Attending: PHYSICIAN ASSISTANT
Payer: COMMERCIAL

## 2025-05-22 DIAGNOSIS — S46.811A TRAUMATIC RUPTURE OF SUPRASPINATUS TENDON OF RIGHT SHOULDER, INITIAL ENCOUNTER: ICD-10-CM

## 2025-05-22 DIAGNOSIS — G89.29 CHRONIC RIGHT SHOULDER PAIN: ICD-10-CM

## 2025-05-22 DIAGNOSIS — Z98.890 S/P RIGHT ROTATOR CUFF REPAIR: Primary | ICD-10-CM

## 2025-05-22 DIAGNOSIS — M25.511 CHRONIC RIGHT SHOULDER PAIN: ICD-10-CM

## 2025-05-22 PROCEDURE — 97112 NEUROMUSCULAR REEDUCATION: CPT | Performed by: PHYSICAL THERAPIST

## 2025-05-22 PROCEDURE — 97140 MANUAL THERAPY 1/> REGIONS: CPT | Performed by: PHYSICAL THERAPIST

## 2025-05-22 NOTE — PROGRESS NOTES
"Daily Note     Today's date: 2025  Patient name: Vu Mcdaniels  : 1970  MRN: [id number]  Referring provider: Clive Solis,*  Dx:   Encounter Diagnosis     ICD-10-CM    1. S/P right rotator cuff repair  Z98.890       2. Traumatic rupture of supraspinatus tendon of right shoulder, initial encounter  S46.811A       3. Chronic right shoulder pain  M25.511     G89.29                      Subjective: Pt denies pain in the right shoulder prior to the start of the session       Objective: See treatment diary below      Assessment: Tolerated treatment well. Patient exhibited good technique with therapeutic exercises and would benefit from continued PT added pulley and finger ladder this date to help increase AAROM of the right shoulder. Good tolerance to progressions.       Plan: Continue per plan of care.      Precautions: S/P right RTC repair       Manuals 5/14 5/15 5/19 5/22         PROM to right shoulder  120 flex 90 abd ER IR  120 flex 90 abd ER IR  120 flex 90 abd ER IR  120 flex 90 abd ER IR                                                 Neuro Re-Ed             Scap squeeze 5''20x HEP 5x20 5x20 5''20x         Pendulum 30x HEP 4 dir X30 ea X30 ea 30x each          Elbow AROM   2x10 2x10 2x10 each          Digiflex   Red   5\" x20 Green  5\" x20 Green 5''20x         TB wrist bar   Red x20 [id number] Green 20x         Isometric                          Ther Ex             UT Levator stretch   15\" x4 ea 15\" x4 ea 15''x4 each          Pulley     5'' 20x flexion          Finger ladder     5''20x flex scap                                                                          Ther Activity                                       Gait Training                                       Modalities             CP x 10 min    X 10 min no adverse effects  X10 min no adverse effects                                       "

## 2025-05-23 DIAGNOSIS — E11.69 TYPE 2 DIABETES MELLITUS WITH OTHER SPECIFIED COMPLICATION, WITHOUT LONG-TERM CURRENT USE OF INSULIN (HCC): ICD-10-CM

## 2025-05-27 ENCOUNTER — APPOINTMENT (OUTPATIENT)
Dept: PHYSICAL THERAPY | Facility: CLINIC | Age: 55
End: 2025-05-27
Attending: PHYSICIAN ASSISTANT
Payer: COMMERCIAL

## 2025-05-27 DIAGNOSIS — S46.811A TRAUMATIC RUPTURE OF SUPRASPINATUS TENDON OF RIGHT SHOULDER, INITIAL ENCOUNTER: ICD-10-CM

## 2025-05-27 RX ORDER — DULAGLUTIDE 1.5 MG/.5ML
1.5 INJECTION, SOLUTION SUBCUTANEOUS WEEKLY
Qty: 4 ML | Refills: 0 | Status: SHIPPED | OUTPATIENT
Start: 2025-05-27

## 2025-05-27 NOTE — PROGRESS NOTES
"Daily Note     Today's date: 2025  Patient name: Vu Mcdaniels  : 1970  MRN: [id number]  Referring provider: Clive Solis,*  Dx: No diagnosis found.               Subjective: ***      Objective: See treatment diary below      Assessment: Tolerated treatment {Tolerated treatment :8092071120}. Patient {assessment:9340820344}      Plan: {PLAN:[id number]}     Precautions: S/P right RTC repair 25, NO AROM for 6 wks.      Manuals 5/14 5/15 5/19 5/22         PROM to right shoulder  120 flex 90 abd ER IR  120 flex 90 abd ER IR  120 flex 90 abd ER IR  120 flex 90 abd ER IR                                                 Neuro Re-Ed             Scap squeeze 5''20x HEP 5x20 5x20 5''20x         Pendulum 30x HEP 4 dir X30 ea X30 ea 30x each          Elbow AROM   2x10 2x10 2x10 each          Digiflex   Red   5\" x20 Green  5\" x20 Green 5''20x         TB wrist bar   Red x20 [id number] Green 20x         Isometric                          Ther Ex             UT Levator stretch   15\" x4 ea 15\" x4 ea 15''x4 each          Pulley     5'' 20x flexion          Finger ladder     5''20x flex scap                                                                          Ther Activity                                       Gait Training                                       Modalities             CP x 10 min    X 10 min no adverse effects  X10 min no adverse effects                                         "

## 2025-05-28 RX ORDER — MELOXICAM 15 MG/1
15 TABLET ORAL DAILY
Qty: 30 TABLET | Refills: 0 | Status: SHIPPED | OUTPATIENT
Start: 2025-05-28

## 2025-05-29 ENCOUNTER — APPOINTMENT (OUTPATIENT)
Dept: PHYSICAL THERAPY | Facility: CLINIC | Age: 55
End: 2025-05-29
Payer: COMMERCIAL

## 2025-05-30 ENCOUNTER — OFFICE VISIT (OUTPATIENT)
Dept: PHYSICAL THERAPY | Facility: CLINIC | Age: 55
End: 2025-05-30
Attending: PHYSICIAN ASSISTANT
Payer: COMMERCIAL

## 2025-05-30 DIAGNOSIS — M25.511 CHRONIC RIGHT SHOULDER PAIN: ICD-10-CM

## 2025-05-30 DIAGNOSIS — S46.811A TRAUMATIC RUPTURE OF SUPRASPINATUS TENDON OF RIGHT SHOULDER, INITIAL ENCOUNTER: ICD-10-CM

## 2025-05-30 DIAGNOSIS — G89.29 CHRONIC RIGHT SHOULDER PAIN: ICD-10-CM

## 2025-05-30 DIAGNOSIS — Z98.890 S/P RIGHT ROTATOR CUFF REPAIR: Primary | ICD-10-CM

## 2025-05-30 PROCEDURE — 97140 MANUAL THERAPY 1/> REGIONS: CPT | Performed by: PHYSICAL THERAPIST

## 2025-05-30 PROCEDURE — 97110 THERAPEUTIC EXERCISES: CPT | Performed by: PHYSICAL THERAPIST

## 2025-05-30 NOTE — PROGRESS NOTES
"Daily Note     Today's date: 2025  Patient name: Vu Mcdaniels  : 1970  MRN: [id number]  Referring provider: Clive Solis,*  Dx:   Encounter Diagnosis     ICD-10-CM    1. S/P right rotator cuff repair  Z98.890       2. Traumatic rupture of supraspinatus tendon of right shoulder, initial encounter  S46.811A       3. Chronic right shoulder pain  M25.511     G89.29                      Subjective: Patient reports his shoulder is really tight and stiff today.       Objective: See treatment diary below      Assessment: Tolerated treatment well. Good recall with exercises, little cueing required. Patient exhibited good technique with therapeutic exercises and would benefit from continued PT      Plan: Continue per plan of care.  Progress treatment as tolerated.       Precautions: S/P right RTC repair       Manuals 5/14 5/15 5/19 5/22 5/30        PROM to right shoulder  120 flex 90 abd ER IR  120 flex 90 abd ER IR  120 flex 90 abd ER IR  120 flex 90 abd ER IR  120 flex    90 abd    Gentle IR/ER    KG                                               Neuro Re-Ed             Scap squeeze 5''20x HEP 5x20 5x20 5''20x 5\" x20        Pendulum 30x HEP 4 dir X30 ea X30 ea 30x each  30x ea        Elbow AROM   2x10 2x10 2x10 each  2x10 ea        Digiflex   Red   5\" x20 Green  5\" x20 Green 5''20x Green 5\" x20        TB wrist bar   Red x20 [id number] Green 20x Green  x20        Isometric                          Ther Ex             UT Levator stretch   15\" x4 ea 15\" x4 ea 15''x4 each  15\" x4 ea        Pulley     5'' 20x flexion  5\" 20x ea flex/scap        Finger ladder     5''20x flex scap 5\" x20 ea flex/scap                                                                         Ther Activity                                       Gait Training                                       Modalities             CP x 10 min    X 10 min no adverse effects  X10 min no adverse effects  X10 mins                      "

## 2025-06-02 ENCOUNTER — OFFICE VISIT (OUTPATIENT)
Dept: PHYSICAL THERAPY | Facility: CLINIC | Age: 55
End: 2025-06-02
Attending: PHYSICIAN ASSISTANT
Payer: COMMERCIAL

## 2025-06-02 DIAGNOSIS — Z98.890 S/P RIGHT ROTATOR CUFF REPAIR: Primary | ICD-10-CM

## 2025-06-02 DIAGNOSIS — M25.511 CHRONIC RIGHT SHOULDER PAIN: ICD-10-CM

## 2025-06-02 DIAGNOSIS — G89.29 CHRONIC RIGHT SHOULDER PAIN: ICD-10-CM

## 2025-06-02 DIAGNOSIS — S46.811A TRAUMATIC RUPTURE OF SUPRASPINATUS TENDON OF RIGHT SHOULDER, INITIAL ENCOUNTER: ICD-10-CM

## 2025-06-02 PROCEDURE — 97110 THERAPEUTIC EXERCISES: CPT | Performed by: PHYSICAL THERAPIST

## 2025-06-02 PROCEDURE — 97140 MANUAL THERAPY 1/> REGIONS: CPT | Performed by: PHYSICAL THERAPIST

## 2025-06-02 PROCEDURE — 97112 NEUROMUSCULAR REEDUCATION: CPT | Performed by: PHYSICAL THERAPIST

## 2025-06-02 NOTE — PROGRESS NOTES
"Daily Note     Today's date: 2025  Patient name: Vu Mcdaniels  : 1970  MRN: [id number]  Referring provider: Clive Solis,*  Dx:   Encounter Diagnosis     ICD-10-CM    1. S/P right rotator cuff repair  Z98.890       2. Traumatic rupture of supraspinatus tendon of right shoulder, initial encounter  S46.811A       3. Chronic right shoulder pain  M25.511     G89.29                      Subjective: Pt reports the shoulder is functional      Objective: See treatment diary below      Assessment: Tolerated treatment well. Patient exhibited good technique with therapeutic exercises and would benefit from continued PT  Progressed RTC protocol this date with good tolerance. PROM is within limits of the protocol.     Plan: Continue per plan of care.      Precautions: S/P right RTC repair       Manuals 5/14 5/15 5/19 5/22 5/30 6/2       PROM to right shoulder  120 flex 90 abd ER IR  120 flex 90 abd ER IR  120 flex 90 abd ER IR  120 flex 90 abd ER IR  120 flex    90 abd    Gentle IR/ER     flex    90 abd    Gentle IR/ER                                              Neuro Re-Ed             Scap squeeze 5''20x HEP 5x20 5x20 5''20x 5\" x20 5\" x20       Pendulum 30x HEP 4 dir X30 ea X30 ea 30x each  30x ea 30x ea       Elbow AROM   2x10 2x10 2x10 each  2x10 ea 2x10 ea       Digiflex   Red   5\" x20 Green  5\" x20 Green 5''20x Green 5\" x20 Green 5\" x20       TB wrist bar   Red x20 [id number] Green 20x Green  x20 Green  x20       Isometric             MTP LTP       Red 5''20x       UBE for postural correction        120        Ther Ex             UT Levator stretch   15\" x4 ea 15\" x4 ea 15''x4 each  15\" x4 ea 15\" x4 ea       Pulley     5'' 20x flexion  5\" 20x ea flex/scap 5\" 20x ea flex/scap       Finger ladder     5''20x flex scap 5\" x20 ea flex/scap 5\" x20 ea flex/scap                                                                        Ther Activity                                       Gait Training  "                                      Modalities             CP x 10 min    X 10 min no adverse effects  X10 min no adverse effects  X10 mins X10 min

## 2025-06-05 ENCOUNTER — OFFICE VISIT (OUTPATIENT)
Dept: PHYSICAL THERAPY | Facility: CLINIC | Age: 55
End: 2025-06-05
Attending: PHYSICIAN ASSISTANT
Payer: COMMERCIAL

## 2025-06-05 DIAGNOSIS — S46.811A TRAUMATIC RUPTURE OF SUPRASPINATUS TENDON OF RIGHT SHOULDER, INITIAL ENCOUNTER: ICD-10-CM

## 2025-06-05 DIAGNOSIS — Z98.890 S/P RIGHT ROTATOR CUFF REPAIR: Primary | ICD-10-CM

## 2025-06-05 DIAGNOSIS — M25.511 CHRONIC RIGHT SHOULDER PAIN: ICD-10-CM

## 2025-06-05 DIAGNOSIS — G89.29 CHRONIC RIGHT SHOULDER PAIN: ICD-10-CM

## 2025-06-05 PROCEDURE — 97110 THERAPEUTIC EXERCISES: CPT | Performed by: PHYSICAL THERAPIST

## 2025-06-05 PROCEDURE — 97112 NEUROMUSCULAR REEDUCATION: CPT | Performed by: PHYSICAL THERAPIST

## 2025-06-05 PROCEDURE — 97140 MANUAL THERAPY 1/> REGIONS: CPT | Performed by: PHYSICAL THERAPIST

## 2025-06-05 NOTE — PROGRESS NOTES
"Daily Note     Today's date: 2025  Patient name: Vu Mcdaniels  : 1970  MRN: [id number]  Referring provider: Clive Solis,*  Dx:   Encounter Diagnosis     ICD-10-CM    1. S/P right rotator cuff repair  Z98.890       2. Traumatic rupture of supraspinatus tendon of right shoulder, initial encounter  S46.811A       3. Chronic right shoulder pain  M25.511     G89.29                      Subjective: Pt states his right shoulder was a little sore from the progressions last visit. He is doing ok today.       Objective: See treatment diary below      Assessment: Tolerated treatment well. Patient exhibited good technique with therapeutic exercises and would benefit from continued PT. Progressing well per MD protocol. Will continue to progress within limits of protocol.       Plan: Continue per plan of care.      Precautions: S/P right RTC repair       Manuals 5/14 5/15 5/19 5/22 5/30 6/2 6/5      PROM to right shoulder  120 flex 90 abd ER IR  120 flex 90 abd ER IR  120 flex 90 abd ER IR  120 flex 90 abd ER IR  120 flex    90 abd    Gentle IR/ER     flex    90 abd    Gentle IR/ER To don, RR                                             Neuro Re-Ed             Scap squeeze 5''20x HEP 5x20 5x20 5''20x 5\" x20 5\" x20 5'20x      Pendulum 30x HEP 4 dir X30 ea X30 ea 30x each  30x ea 30x ea 30x each       Elbow AROM   2x10 2x10 2x10 each  2x10 ea 2x10 ea --      Digiflex   Red   5\" x20 Green  5\" x20 Green 5''20x Green 5\" x20 Green 5\" x20 --      TB wrist bar   Red x20 [id number] Green 20x Green  x20 Green  x20 --      Isometric             MTP LTP       Red 5''20x Green 5''20x      UBE for postural correction        120  5/ 120       Ther Ex             UT Levator stretch   15\" x4 ea 15\" x4 ea 15''x4 each  15\" x4 ea 15\" x4 ea 15\" x4 ea      Pulley     5'' 20x flexion  5\" 20x ea flex/scap 5\" 20x ea flex/scap 5\" 20x ea flex/scap      Finger ladder     5''20x flex scap 5\" x20 ea flex/scap 5\" x20 ea " "flex/scap 5\" x20 ea flex/scap                                                                       Ther Activity                                       Gait Training                                       Modalities             CP x 10 min    X 10 min no adverse effects  X10 min no adverse effects  X10 mins X10 min                                           "

## 2025-06-09 ENCOUNTER — OFFICE VISIT (OUTPATIENT)
Dept: PHYSICAL THERAPY | Facility: CLINIC | Age: 55
End: 2025-06-09
Attending: PHYSICIAN ASSISTANT
Payer: COMMERCIAL

## 2025-06-09 DIAGNOSIS — S46.811A TRAUMATIC RUPTURE OF SUPRASPINATUS TENDON OF RIGHT SHOULDER, INITIAL ENCOUNTER: ICD-10-CM

## 2025-06-09 DIAGNOSIS — Z98.890 S/P RIGHT ROTATOR CUFF REPAIR: Primary | ICD-10-CM

## 2025-06-09 DIAGNOSIS — M25.511 CHRONIC RIGHT SHOULDER PAIN: ICD-10-CM

## 2025-06-09 DIAGNOSIS — G89.29 CHRONIC RIGHT SHOULDER PAIN: ICD-10-CM

## 2025-06-09 PROCEDURE — 97140 MANUAL THERAPY 1/> REGIONS: CPT

## 2025-06-09 PROCEDURE — 97110 THERAPEUTIC EXERCISES: CPT

## 2025-06-09 PROCEDURE — 97112 NEUROMUSCULAR REEDUCATION: CPT

## 2025-06-09 NOTE — PROGRESS NOTES
"Daily Note     Today's date: 2025  Patient name: Vu Mcdaniels  : 1970  MRN: [id number]  Referring provider: Clive Solis,*  Dx:   Encounter Diagnosis     ICD-10-CM    1. S/P right rotator cuff repair  Z98.890       2. Traumatic rupture of supraspinatus tendon of right shoulder, initial encounter  S46.811A       3. Chronic right shoulder pain  M25.511     G89.29                      Subjective: Pt is to have a f/u with his ortho tomorrow. He is anxious to begin driving and DC his sling. He denied pain at rest.       Objective: See treatment diary below      Assessment: Tolerated treatment well. Patient demonstrated fatigue post treatment, exhibited good technique with therapeutic exercises, and would benefit from continued PT      Plan: Continue per plan of care.  Progress treament per protocol.      Precautions: S/P right RTC repair       Manuals 5/14 5/15 5/19 5/22 5/30 6/2 6/5 6/9     PROM to right shoulder  120 flex 90 abd ER IR  120 flex 90 abd ER IR  120 flex 90 abd ER IR  120 flex 90 abd ER IR  120 flex    90 abd    Gentle IR/ER     flex    90 abd    Gentle IR/ER To don, RR To don, TM                                            Neuro Re-Ed             Scap squeeze 5''20x HEP 5x20 5x20 5''20x 5\" x20 5\" x20 5'20x      Pendulum 30x HEP 4 dir X30 ea X30 ea 30x each  30x ea 30x ea 30x each  X30 ea     Elbow AROM   2x10 2x10 2x10 each  2x10 ea 2x10 ea --      Digiflex   Red   5\" x20 Green  5\" x20 Green 5''20x Green 5\" x20 Green 5\" x20 --      TB wrist bar   Red x20 [id number] Green 20x Green  x20 Green  x20 --      Isometric             MTP LTP       Red 5''20x Green 5''20x Green 5''20x     UBE for postural correction       5 120  5/ 120  120 rpm 5'/5'     Ther Ex             UT Levator stretch   15\" x4 ea 15\" x4 ea 15''x4 each  15\" x4 ea 15\" x4 ea 15\" x4 ea      Pulley     5'' 20x flexion  5\" 20x ea flex/scap 5\" 20x ea flex/scap 5\" 20x ea flex/scap 5\" x20 ea flex/scap     Finger " "ladder     5''20x flex scap 5\" x20 ea flex/scap 5\" x20 ea flex/scap 5\" x20 ea flex/scap 5\" x20 ea flex/scap                                                                      Ther Activity                                       Gait Training                                       Modalities             CP x 10 min    X 10 min no adverse effects  X10 min no adverse effects  X10 mins X10 min                                             "

## 2025-06-10 ENCOUNTER — OFFICE VISIT (OUTPATIENT)
Dept: OBGYN CLINIC | Facility: CLINIC | Age: 55
End: 2025-06-10

## 2025-06-10 VITALS — BODY MASS INDEX: 41.44 KG/M2 | HEIGHT: 67 IN | WEIGHT: 264 LBS

## 2025-06-10 DIAGNOSIS — Z98.890 S/P ARTHROSCOPY OF RIGHT SHOULDER: Primary | ICD-10-CM

## 2025-06-10 PROCEDURE — 99024 POSTOP FOLLOW-UP VISIT: CPT | Performed by: ORTHOPAEDIC SURGERY

## 2025-06-10 NOTE — PROGRESS NOTES
Assessment & Plan  S/P arthroscopy of right shoulder  The patient was seen and examined.  He no longer needs to use the shoulder immobilizer.  He should continue physical therapy to work on strengthening, stretching and range of motion.  He may perform active range of motion of his shoulder.  Will follow-up with our office in 6 weeks for strength and motion check.  He is acceptable to this plan.             The patient is doing quite well in regards to his right shoulder rotator cuff repair.  He can nearly actively flex and abduct through 90 degrees.  Stability is present.  Incisions clean dry.  May start an active range of motion program.  Follow-up 6 weeks for strength and motion check    Return in about 6 weeks (around 7/22/2025).      _____________________________________________________  CHIEF COMPLAINT:  Chief Complaint   Patient presents with    Right Shoulder - Post-op, Follow-up         SUBJECTIVE:  Vu Mcdaniels is a 54 y.o. male who presents to our office for a postop visit.  The patient is status post right shoulder arthroscopy with rotator cuff repair from 4/30/2025.  He has been using the shoulder immobilizer and has been participating in physical therapy.  He states he has not performed active range of motion.  He denies any numbness or tingling.  He denies any fever or chills.    The following portions of the patient's history were reviewed and updated as appropriate: allergies, current medications, past family history, past medical history, past social history, past surgical history and problem list.    PAST MEDICAL HISTORY:  Past Medical History[1]    PAST SURGICAL HISTORY:  Past Surgical History[2]    FAMILY HISTORY:  Family History[3]    SOCIAL HISTORY:  Social History[4]    MEDICATIONS:  Current Medications[5]    ALLERGIES:  Allergies[6]    ROS:  Review of Systems     Constitutional: Negative for fatigue, fever or loss of appetite.   HENT: Negative.    Respiratory: Negative for shortness of  "breath, dyspnea.    Cardiovascular: Negative for chest pain/tightness.   Gastrointestinal: Negative for abdominal pain, N/V.   Endocrine: Negative for cold/heat intolerance, unexplained weight loss/gain.   Genitourinary: Negative for flank pain, dysuria, hematuria.   Musculoskeletal: Positive for arthralgia   Skin: Negative for rash.    Neurological: Negative for numbness or tingling  Psychiatric/Behavioral: Negative for agitation.  _____________________________________________________  PHYSICAL EXAMINATION:    Height 5' 7\" (1.702 m), weight 120 kg (264 lb).    Constitutional: Oriented to person, place, and time. Appears well-developed and well-nourished. No distress.   HENT:   Head: Normocephalic.   Eyes: Conjunctivae are normal. Right eye exhibits no discharge. Left eye exhibits no discharge. No scleral icterus.   Cardiovascular: Normal rate.    Pulmonary/Chest: Effort normal.   Neurological: Alert and oriented to person, place, and time.   Skin: Skin is warm and dry. No rash noted. Not diaphoretic. No erythema. No pallor.   Psychiatric: Normal mood and affect. Behavior is normal. Judgment and thought content normal.      MUSCULOSKELETAL EXAMINATION:   Physical Exam  Ortho Exam    Right upper extremity is neurovascularly intact  Fingers are pink and mobile  Compartments are soft  Incisions are healing well  No warmth or erythema  Active range of motion of the shoulder is from 0 to 70 degrees of forward flexion and abduction  Brisk cap refill  Sensation intact  Objective:  BP Readings from Last 1 Encounters:   05/14/25 117/76      Wt Readings from Last 1 Encounters:   06/10/25 120 kg (264 lb)        BMI:   Estimated body mass index is 41.35 kg/m² as calculated from the following:    Height as of this encounter: 5' 7\" (1.702 m).    Weight as of this encounter: 120 kg (264 lb).      Scribe Attestation      I,:  Clive Solis PA-C am acting as a scribe while in the presence of the attending physician.:     "   I,:  Leonid Walden DO personally performed the services described in this documentation    as scribed in my presence.:                   [1]   Past Medical History:  Diagnosis Date    Colon polyp     CPAP (continuous positive airway pressure) dependence     compliant    Hypercholesterolemia     Hypertension     Prediabetes     Sleep apnea    [2]   Past Surgical History:  Procedure Laterality Date    COLONOSCOPY      FL INJECTION RIGHT SHOULDER (ARTHROGRAM)  03/17/2025    HERNIA REPAIR Bilateral     [facility] SURGICAL ARTHROSCOPY SHOULDER W/ROTATOR CUFF RPR Right 4/30/2025    Procedure: Right - RIGHT SHOULDER ARTHROSCOPY Synovectomy Debridement Acromioplasty Arthroscopic rotator cuff repair Post arthroscopic injection of intra-articular joint space and peripheral portals;  Surgeon: Leonid Walden DO;  Location: [facility];  Service: Orthopedics    REPAIR LABRUM Right    [3]   Family History  Problem Relation Name Age of Onset    Graves' disease Mother     [4]   Social History  Tobacco Use    Smoking status: Some Days     Types: Cigars     Passive exposure: Never    Smokeless tobacco: Never    Tobacco comments:     Smokes cigar on occasion   Vaping Use    Vaping status: Never Used   Substance Use Topics    Alcohol use: Yes     Alcohol/week: 1.0 standard drink of alcohol     Types: 1 Standard drinks or equivalent per week     Comment: social    Drug use: Never   [5]   Current Outpatient Medications:     Alcohol Swabs (Alcohol Prep Pads) 70 % PADS, CHECK BLOOD SUGARS TWICE DAILY., Disp: 200 each, Rfl: 0    atorvastatin (LIPITOR) 40 mg tablet, Take 1 tablet (40 mg total) by mouth daily, Disp: 100 tablet, Rfl: 1    Blood Glucose Monitoring Suppl (OneTouch Verio Reflect) w/Device KIT, Check blood sugars twice daily. Please substitute with appropriate alternative as covered by patient's insurance. Dx: E11.65, Disp: 1 kit, Rfl: 0    glucose blood (OneTouch Verio) test strip, Check blood sugars twice daily. Please  substitute with appropriate alternative as covered by patient's insurance. Dx: E11.65, Disp: 200 each, Rfl: 3    losartan-hydrochlorothiazide (HYZAAR) 100-25 MG per tablet, Take 1 tablet by mouth daily, Disp: 30 tablet, Rfl: 5    meloxicam (MOBIC) 15 mg tablet, TAKE 1 TABLET (15 MG TOTAL) BY MOUTH DAILY., Disp: 30 tablet, Rfl: 0    metFORMIN (GLUCOPHAGE-XR) 500 mg 24 hr tablet, Take 2 tablets (1,000 mg total) by mouth daily with dinner, Disp: 60 tablet, Rfl: 2    OneTouch Delica Lancets 33G MISC, Check blood sugars twice daily. Please substitute with appropriate alternative as covered by patient's insurance. Dx: E11.65, Disp: 200 each, Rfl: 3    Trulicity 1.5 MG/0.5ML SOAJ, INJECT 1.5 MG UNDER THE SKIN ONCE A WEEK, Disp: 4 mL, Rfl: 0    aspirin 325 mg tablet, Take 325 mg by mouth daily (Patient not taking: Reported on 5/8/2025), Disp: , Rfl:     polyethylene glycol (GOLYTELY) 4000 mL solution, Take 4,000 mL by mouth once for 1 dose Follow GI office instructions, Disp: 4000 mL, Rfl: 0  [6]   Allergies  Allergen Reactions    Oxycodone-Acetaminophen Syncope     feint  Pt passes out

## 2025-06-12 ENCOUNTER — OFFICE VISIT (OUTPATIENT)
Dept: PHYSICAL THERAPY | Facility: CLINIC | Age: 55
End: 2025-06-12
Attending: PHYSICIAN ASSISTANT
Payer: COMMERCIAL

## 2025-06-12 DIAGNOSIS — E11.69 TYPE 2 DIABETES MELLITUS WITH OTHER SPECIFIED COMPLICATION, WITHOUT LONG-TERM CURRENT USE OF INSULIN (HCC): ICD-10-CM

## 2025-06-12 DIAGNOSIS — M25.511 CHRONIC RIGHT SHOULDER PAIN: ICD-10-CM

## 2025-06-12 DIAGNOSIS — G89.29 CHRONIC RIGHT SHOULDER PAIN: ICD-10-CM

## 2025-06-12 DIAGNOSIS — Z98.890 S/P RIGHT ROTATOR CUFF REPAIR: Primary | ICD-10-CM

## 2025-06-12 DIAGNOSIS — S46.811A TRAUMATIC RUPTURE OF SUPRASPINATUS TENDON OF RIGHT SHOULDER, INITIAL ENCOUNTER: ICD-10-CM

## 2025-06-12 PROCEDURE — 97112 NEUROMUSCULAR REEDUCATION: CPT | Performed by: PHYSICAL THERAPIST

## 2025-06-12 PROCEDURE — 97140 MANUAL THERAPY 1/> REGIONS: CPT | Performed by: PHYSICAL THERAPIST

## 2025-06-12 PROCEDURE — 97110 THERAPEUTIC EXERCISES: CPT | Performed by: PHYSICAL THERAPIST

## 2025-06-12 RX ORDER — DULAGLUTIDE 1.5 MG/.5ML
1.5 INJECTION, SOLUTION SUBCUTANEOUS WEEKLY
Qty: 6 ML | Refills: 1 | Status: SHIPPED | OUTPATIENT
Start: 2025-06-12

## 2025-06-12 NOTE — PROGRESS NOTES
"Daily Note     Today's date: 2025  Patient name: Vu Mcdaniels  : 1970  MRN: [id number]  Referring provider: Clive Solis,*  Dx:   Encounter Diagnosis     ICD-10-CM    1. S/P right rotator cuff repair  Z98.890       2. Chronic right shoulder pain  M25.511     G89.29       3. Traumatic rupture of supraspinatus tendon of right shoulder, initial encounter  S46.861A                      Subjective: Pt reports he Saw MD   Progressing well.   Sling is DC       Objective: See treatment diary below      Assessment: Tolerated treatment well. Patient exhibited good technique with therapeutic exercises and would benefit from continued PT. Added multiple exercises this date to help increase strength in the right shoulder, Good tolerance to progression. Continue to progress Per protocol.       Plan: Continue per plan of care.      Precautions: S/P right RTC repair       Manuals 5/14 5/15 5/19 5/22 5/30 6/2 6/5 6/9 6/12    PROM to right shoulder  120 flex 90 abd ER IR  120 flex 90 abd ER IR  120 flex 90 abd ER IR  120 flex 90 abd ER IR  120 flex    90 abd    Gentle IR/ER     flex    90 abd    Gentle IR/ER To don, RR To don, TM To don RR                                            Neuro Re-Ed             Scap squeeze 5''20x HEP 5x20 5x20 5''20x 5\" x20 5\" x20 5'20x      Pendulum 30x HEP 4 dir X30 ea X30 ea 30x each  30x ea 30x ea 30x each  X30 ea     Elbow AROM   2x10 2x10 2x10 each  2x10 ea 2x10 ea --      Digiflex   Red   5\" x20 Green  5\" x20 Green 5''20x Green 5\" x20 Green 5\" x20 --      TB wrist bar   Red x20 [id number] Green 20x Green  x20 Green  x20 --      Isometric             MTP LTP       Red 5''20x Green 5''20x Green 5''20x Green 5''20x    UBE for postural correction       5/ 120  5/5 120  120 rpm 5'/5' 120 rpm 5'/5'    Compass         30x each     Sidelying ER          3x10                               Ther Ex             UT Levator stretch   15\" x4 ea 15\" x4 ea 15''x4 each  15\" x4 ea 15\" " "x4 ea 15\" x4 ea      Pulley     5'' 20x flexion  5\" 20x ea flex/scap 5\" 20x ea flex/scap 5\" 20x ea flex/scap 5\" x20 ea flex/scap 5\" x20 ea flex/scap    Finger ladder     5''20x flex scap 5\" x20 ea flex/scap 5\" x20 ea flex/scap 5\" x20 ea flex/scap 5\" x20 ea flex/scap 5\" x20 ea flex/scap                                                                     Ther Activity                                       Gait Training                                       Modalities             CP x 10 min    X 10 min no adverse effects  X10 min no adverse effects  X10 mins X10 min                                               "

## 2025-06-16 ENCOUNTER — OFFICE VISIT (OUTPATIENT)
Dept: PHYSICAL THERAPY | Facility: CLINIC | Age: 55
End: 2025-06-16
Attending: PHYSICIAN ASSISTANT
Payer: COMMERCIAL

## 2025-06-16 DIAGNOSIS — Z98.890 S/P RIGHT ROTATOR CUFF REPAIR: Primary | ICD-10-CM

## 2025-06-16 DIAGNOSIS — S46.811A TRAUMATIC RUPTURE OF SUPRASPINATUS TENDON OF RIGHT SHOULDER, INITIAL ENCOUNTER: ICD-10-CM

## 2025-06-16 DIAGNOSIS — G89.29 CHRONIC RIGHT SHOULDER PAIN: ICD-10-CM

## 2025-06-16 DIAGNOSIS — M25.511 CHRONIC RIGHT SHOULDER PAIN: ICD-10-CM

## 2025-06-16 PROCEDURE — 97112 NEUROMUSCULAR REEDUCATION: CPT

## 2025-06-16 PROCEDURE — 97110 THERAPEUTIC EXERCISES: CPT

## 2025-06-16 PROCEDURE — 97140 MANUAL THERAPY 1/> REGIONS: CPT

## 2025-06-16 NOTE — PROGRESS NOTES
"Daily Note     Today's date: 2025  Patient name: Vu Mcdaniels  : 1970  MRN: [id number]  Referring provider: Clive Solis,*  Dx:   Encounter Diagnosis     ICD-10-CM    1. S/P right rotator cuff repair  Z98.890       2. Chronic right shoulder pain  M25.511     G89.29       3. Traumatic rupture of supraspinatus tendon of right shoulder, initial encounter  S46.711A                      Subjective: Pt reported minimal soreness prior to PT. He has been active at home.      Objective: See treatment diary below      Assessment: Pt is approaching 7 weeks post op. Initiated multiple PREs to facilitate RUE strength. Tolerated treatment well. Patient demonstrated fatigue post treatment, exhibited good technique with therapeutic exercises, and would benefit from continued PT      Plan: Continue per plan of care.  Progress treament per protocol.      Precautions: S/P right RTC repair       Manuals 5/14 5/15 5/19 5/22 5/30 6/2 6/5 6/9 6/12 6/16   PROM to right shoulder  120 flex 90 abd ER IR  120 flex 90 abd ER IR  120 flex 90 abd ER IR  120 flex 90 abd ER IR  120 flex    90 abd    Gentle IR/ER     flex    90 abd    Gentle IR/ER To don, RR To dno, TM To don RR  To don  TM                                          Neuro Re-Ed             Scap squeeze 5''20x HEP 5x20 5x20 5''20x 5\" x20 5\" x20 5'20x      Pendulum 30x HEP 4 dir X30 ea X30 ea 30x each  30x ea 30x ea 30x each  X30 ea  X30 ea   Elbow AROM   2x10 2x10 2x10 each  2x10 ea 2x10 ea --      Digiflex   Red   5\" x20 Green  5\" x20 Green 5''20x Green 5\" x20 Green 5\" x20 --      TB wrist bar   Red x20 [id number] Green 20x Green  x20 Green  x20 --      IE/ER w/ TB          Red 2x10 ea   MTP/LTP w/ TB      Red 5''20x Green 5''20x Green 5''20x Green 5''20x  Green 5''20x ea   UBE for postural correction        120  5/ 120  120 rpm 5'/5' 120 rpm 5'/5' 120 rpm 5'/5'   Compass         30x each  X30 ea    Sidelying ER          3x10  3x10   Prone " "flex/ext/hrz abd          2x10 ea                Ther Ex             UT Levator stretch   15\" x4 ea 15\" x4 ea 15''x4 each  15\" x4 ea 15\" x4 ea 15\" x4 ea      Pulley     5'' 20x flexion  5\" 20x ea flex/scap 5\" 20x ea flex/scap 5\" 20x ea flex/scap 5\" x20 ea flex/scap 5\" x20 ea flex/scap 5\" x20 ea flex/scap   Finger ladder     5''20x flex scap 5\" x20 ea flex/scap 5\" x20 ea flex/scap 5\" x20 ea flex/scap 5\" x20 ea flex/scap 5\" x20 ea flex/scap 5\" x20 ea flex/scap                                                                    Ther Activity                                       Gait Training                                       Modalities             CP x 10 min    X 10 min no adverse effects  X10 min no adverse effects  X10 mins X10 min                                                 "

## 2025-06-19 ENCOUNTER — OFFICE VISIT (OUTPATIENT)
Dept: PHYSICAL THERAPY | Facility: CLINIC | Age: 55
End: 2025-06-19
Attending: PHYSICIAN ASSISTANT
Payer: COMMERCIAL

## 2025-06-19 DIAGNOSIS — Z98.890 S/P RIGHT ROTATOR CUFF REPAIR: Primary | ICD-10-CM

## 2025-06-19 DIAGNOSIS — G89.29 CHRONIC RIGHT SHOULDER PAIN: ICD-10-CM

## 2025-06-19 DIAGNOSIS — S46.811A TRAUMATIC RUPTURE OF SUPRASPINATUS TENDON OF RIGHT SHOULDER, INITIAL ENCOUNTER: ICD-10-CM

## 2025-06-19 DIAGNOSIS — M25.511 CHRONIC RIGHT SHOULDER PAIN: ICD-10-CM

## 2025-06-19 PROCEDURE — 97112 NEUROMUSCULAR REEDUCATION: CPT | Performed by: PHYSICAL THERAPIST

## 2025-06-19 PROCEDURE — 97110 THERAPEUTIC EXERCISES: CPT | Performed by: PHYSICAL THERAPIST

## 2025-06-19 PROCEDURE — 97140 MANUAL THERAPY 1/> REGIONS: CPT | Performed by: PHYSICAL THERAPIST

## 2025-06-19 NOTE — PROGRESS NOTES
"Daily Note     Today's date: 2025  Patient name: Vu Mcdaniels  : 1970  MRN: [id number]  Referring provider: Clive Solis,*  Dx:   Encounter Diagnosis     ICD-10-CM    1. S/P right rotator cuff repair  Z98.890       2. Chronic right shoulder pain  M25.511     G89.29       3. Traumatic rupture of supraspinatus tendon of right shoulder, initial encounter  S46.811A                      Subjective: Pt states his right shoulder was sore from adding new exercises last visit       Objective: See treatment diary below      Assessment: Tolerated treatment well. Patient exhibited good technique with therapeutic exercises and would benefit from continued PT      Plan: Continue per plan of care.      Precautions: S/P right RTC repair       Manuals 6/19 5/15 5/19 5/22 5/30 6/2 6/5 6/9 6/12 6/16   PROM to right shoulder  To don   flex 90 abd ER IR  120 flex 90 abd ER IR  120 flex 90 abd ER IR  120 flex    90 abd    Gentle IR/ER     flex    90 abd    Gentle IR/ER To don, RR To don, TM To don RR  To don  TM                                          Neuro Re-Ed             Scap squeeze  5x20 5x20 5''20x 5\" x20 5\" x20 5'20x      Pendulum  X30 ea X30 ea 30x each  30x ea 30x ea 30x each  X30 ea  X30 ea   Elbow AROM   2x10 2x10 2x10 each  2x10 ea 2x10 ea --      Digiflex   Red   5\" x20 Green  5\" x20 Green 5''20x Green 5\" x20 Green 5\" x20 --      TB wrist bar   Red x20 [id number] Green 20x Green  x20 Green  x20 --      IE/ER w/ TB          Red 2x10 ea   MTP/LTP w/ TB Green 5''20x ea     Red 5''20x Green 5''20x Green 5''20x Green 5''20x  Green 5''20x ea   UBE for postural correction  120 rpm 5'/5'     5/5 120  5/5 120  120 rpm 5'/5' 120 rpm 5'/5' 120 rpm 5'/5'   Compass X30 ea         30x each  X30 ea    Sidelying ER  3x10        3x10  3x10   Prone flex/ext/hrz abd 2x10 ea         2x10 ea                Ther Ex             UT Levator stretch   15\" x4 ea 15\" x4 ea 15''x4 each  15\" x4 ea 15\" x4 ea 15\" x4 " "ea      Pulley  5\" x20 ea flex/scap   5'' 20x flexion  5\" 20x ea flex/scap 5\" 20x ea flex/scap 5\" 20x ea flex/scap 5\" x20 ea flex/scap 5\" x20 ea flex/scap 5\" x20 ea flex/scap   Finger ladder  5\" x20 ea flex/scap   5''20x flex scap 5\" x20 ea flex/scap 5\" x20 ea flex/scap 5\" x20 ea flex/scap 5\" x20 ea flex/scap 5\" x20 ea flex/scap 5\" x20 ea flex/scap                                                                    Ther Activity                                       Gait Training                                       Modalities             CP x 10 min    X 10 min no adverse effects  X10 min no adverse effects  X10 mins X10 min                                                   "

## 2025-06-20 DIAGNOSIS — E11.69 TYPE 2 DIABETES MELLITUS WITH OTHER SPECIFIED COMPLICATION, UNSPECIFIED WHETHER LONG TERM INSULIN USE (HCC): ICD-10-CM

## 2025-06-21 RX ORDER — METFORMIN HYDROCHLORIDE 500 MG/1
1000 TABLET, EXTENDED RELEASE ORAL
Qty: 60 TABLET | Refills: 2 | Status: SHIPPED | OUTPATIENT
Start: 2025-06-21 | End: 2025-09-19

## 2025-06-23 ENCOUNTER — OFFICE VISIT (OUTPATIENT)
Dept: PHYSICAL THERAPY | Facility: CLINIC | Age: 55
End: 2025-06-23
Attending: PHYSICIAN ASSISTANT
Payer: COMMERCIAL

## 2025-06-23 DIAGNOSIS — G89.29 CHRONIC RIGHT SHOULDER PAIN: ICD-10-CM

## 2025-06-23 DIAGNOSIS — S46.811A TRAUMATIC RUPTURE OF SUPRASPINATUS TENDON OF RIGHT SHOULDER, INITIAL ENCOUNTER: ICD-10-CM

## 2025-06-23 DIAGNOSIS — Z98.890 S/P RIGHT ROTATOR CUFF REPAIR: Primary | ICD-10-CM

## 2025-06-23 DIAGNOSIS — M25.511 CHRONIC RIGHT SHOULDER PAIN: ICD-10-CM

## 2025-06-23 PROCEDURE — 97112 NEUROMUSCULAR REEDUCATION: CPT

## 2025-06-23 PROCEDURE — 97110 THERAPEUTIC EXERCISES: CPT

## 2025-06-23 NOTE — PROGRESS NOTES
"Daily Note     Today's date: 2025  Patient name: Vu Mcdaniels  : 1970  MRN: [id number]  Referring provider: Clive Solis,*  Dx:   Encounter Diagnosis     ICD-10-CM    1. S/P right rotator cuff repair  Z98.890       2. Chronic right shoulder pain  M25.511     G89.29       3. Traumatic rupture of supraspinatus tendon of right shoulder, initial encounter  S46.811A                      Subjective: Pt reports his R shoulder is doing well.      Objective: See treatment diary below      Assessment: Tolerated treatment well. Good tolerance with R shoulder PROM. Verbal cues for proper exercise performance with compass exercise.  Patient demonstrated fatigue post treatment, exhibited good technique with therapeutic exercises, and would benefit from continued PT      Plan: Continue per plan of care.      Precautions: S/P right RTC repair       Manuals    PROM to right shoulder  To don  TM To don   flex 90 abd ER IR  120 flex 90 abd ER IR  120 flex    90 abd    Gentle IR/ER     flex    90 abd    Gentle IR/ER To don, RR To don, TM To don RR  To don  TM                                          Neuro Re-Ed             Scap squeeze   5x20 5''20x 5\" x20 5\" x20 5'20x      Pendulum   X30 ea 30x each  30x ea 30x ea 30x each  X30 ea  X30 ea   Elbow AROM    2x10 2x10 each  2x10 ea 2x10 ea --      Digiflex    Green  5\" x20 Green 5''20x Green 5\" x20 Green 5\" x20 --      TB wrist bar    [id number] Green 20x Green  x20 Green  x20 --      IE/ER w/ TB  RTB  2x10 ea        Red 2x10 ea   MTP/LTP w/ TB Green 5''20x ea GTB  5\" X20       Red 5''20x Green 5''20x Green 5''20x Green 5''20x  Green 5''20x ea   UBE for postural correction  120 rpm 5'/5' 120rpm  5'/5'    5/5 120  5/5 120  120 rpm 5'/5' 120 rpm 5'/5' 120 rpm 5'/5'   Compass X30 ea  x30       30x each  X30 ea    Sidelying ER  3x10 3x10       3x10  3x10   Prone flex/ext/hrz abd 2x10 ea 2x10 ea        2x10 ea " "               Ther Ex             UT Levator stretch    15\" x4 ea 15''x4 each  15\" x4 ea 15\" x4 ea 15\" x4 ea      Pulley  5\" x20 ea flex/scap 5\" x20 ea flex/scap  5'' 20x flexion  5\" 20x ea flex/scap 5\" 20x ea flex/scap 5\" 20x ea flex/scap 5\" x20 ea flex/scap 5\" x20 ea flex/scap 5\" x20 ea flex/scap   Finger ladder  5\" x20 ea flex/scap 5\" x20 ea flex/scap  5''20x flex scap 5\" x20 ea flex/scap 5\" x20 ea flex/scap 5\" x20 ea flex/scap 5\" x20 ea flex/scap 5\" x20 ea flex/scap 5\" x20 ea flex/scap                                                                    Ther Activity                                       Gait Training                                       Modalities             CP x 10 min    X 10 min no adverse effects  X10 min no adverse effects  X10 mins X10 min                                                     "

## 2025-06-26 ENCOUNTER — OFFICE VISIT (OUTPATIENT)
Dept: PHYSICAL THERAPY | Facility: CLINIC | Age: 55
End: 2025-06-26
Attending: PHYSICIAN ASSISTANT
Payer: COMMERCIAL

## 2025-06-26 DIAGNOSIS — S46.811A TRAUMATIC RUPTURE OF SUPRASPINATUS TENDON OF RIGHT SHOULDER, INITIAL ENCOUNTER: ICD-10-CM

## 2025-06-26 DIAGNOSIS — M25.511 CHRONIC RIGHT SHOULDER PAIN: ICD-10-CM

## 2025-06-26 DIAGNOSIS — G89.29 CHRONIC RIGHT SHOULDER PAIN: ICD-10-CM

## 2025-06-26 DIAGNOSIS — Z98.890 S/P RIGHT ROTATOR CUFF REPAIR: Primary | ICD-10-CM

## 2025-06-26 PROCEDURE — 97110 THERAPEUTIC EXERCISES: CPT | Performed by: PHYSICAL THERAPIST

## 2025-06-26 PROCEDURE — 97140 MANUAL THERAPY 1/> REGIONS: CPT | Performed by: PHYSICAL THERAPIST

## 2025-06-26 PROCEDURE — 97112 NEUROMUSCULAR REEDUCATION: CPT | Performed by: PHYSICAL THERAPIST

## 2025-06-26 NOTE — PROGRESS NOTES
"Daily Note     Today's date: 2025  Patient name: Vu Mcdaniels  : 1970  MRN: [id number]  Referring provider: Clive Solis,*  Dx:   Encounter Diagnosis     ICD-10-CM    1. S/P right rotator cuff repair  Z98.890       2. Chronic right shoulder pain  M25.511     G89.29       3. Traumatic rupture of supraspinatus tendon of right shoulder, initial encounter  S46.811A                      Subjective: Pt states right shoulder was mildly after last session.       Objective: See treatment diary below      Assessment: Tolerated treatment well. Patient exhibited good technique with therapeutic exercises and would benefit from continued PT      Plan: Continue per plan of care.      Precautions: S/P right RTC repair       Manuals    PROM to right shoulder  To don  TM To don  MJC RR to don  120 flex 90 abd ER IR  120 flex    90 abd    Gentle IR/ER     flex    90 abd    Gentle IR/ER To don, RR To don, TM To don RR  To don  TM                                          Neuro Re-Ed             Scap squeeze    5''20x 5\" x20 5\" x20 5'20x      Pendulum    30x each  30x ea 30x ea 30x each  X30 ea  X30 ea   Elbow AROM     2x10 each  2x10 ea 2x10 ea --      Digiflex     Green 5''20x Green 5\" x20 Green 5\" x20 --      TB wrist bar     Green 20x Green  x20 Green  x20 --      IE/ER w/ TB  RTB  2x10 ea RTB  2x10 ea       Red 2x10 ea   MTP/LTP w/ TB Green 5''20x ea GTB  5\" X20    GTB  5\" X20    Red 5''20x Green 5''20x Green 5''20x Green 5''20x  Green 5''20x ea   UBE for postural correction  120 rpm 5'/5' 120rpm  5'/5' 120rpm  5'/5'   5/5 120  5/5 120  120 rpm 5'/5' 120 rpm 5'/5' 120 rpm 5'/5'   Compass X30 ea  x30 x30      30x each  X30 ea    Sidelying ER  3x10 3x10 3x10       3x10  3x10   Prone flex/ext/hrz abd 2x10 ea 2x10 ea 2x10 ea       2x10 ea                Ther Ex             UT Levator stretch    15\" x4 ea 15''x4 each  15\" x4 ea 15\" x4 ea 15\" x4 ea      Pulley " " 5\" x20 ea flex/scap 5\" x20 ea flex/scap 5\" x20 ea flex/scap 5'' 20x flexion  5\" 20x ea flex/scap 5\" 20x ea flex/scap 5\" 20x ea flex/scap 5\" x20 ea flex/scap 5\" x20 ea flex/scap 5\" x20 ea flex/scap   Finger ladder  5\" x20 ea flex/scap 5\" x20 ea flex/scap 5\" x20 ea flex/scap 5''20x flex scap 5\" x20 ea flex/scap 5\" x20 ea flex/scap 5\" x20 ea flex/scap 5\" x20 ea flex/scap 5\" x20 ea flex/scap 5\" x20 ea flex/scap                                                                    Ther Activity                                       Gait Training                                       Modalities             CP x 10 min      X10 min no adverse effects  X10 mins X10 min                                                       "

## 2025-06-27 ENCOUNTER — RA CDI HCC (OUTPATIENT)
Dept: OTHER | Facility: HOSPITAL | Age: 55
End: 2025-06-27

## 2025-06-27 NOTE — PROGRESS NOTES
HCC coding opportunities          Chart Reviewed number of suggestions sent to Provider: 3  E66.01  E11.22  E11.65     Patients Insurance        Commercial Insurance: [employer]

## 2025-06-30 ENCOUNTER — APPOINTMENT (OUTPATIENT)
Dept: PHYSICAL THERAPY | Facility: CLINIC | Age: 55
End: 2025-06-30
Attending: PHYSICIAN ASSISTANT
Payer: COMMERCIAL

## 2025-07-01 ENCOUNTER — OFFICE VISIT (OUTPATIENT)
Dept: PHYSICAL THERAPY | Facility: CLINIC | Age: 55
End: 2025-07-01
Attending: PHYSICIAN ASSISTANT
Payer: COMMERCIAL

## 2025-07-01 DIAGNOSIS — Z98.890 S/P RIGHT ROTATOR CUFF REPAIR: Primary | ICD-10-CM

## 2025-07-01 DIAGNOSIS — M25.511 CHRONIC RIGHT SHOULDER PAIN: ICD-10-CM

## 2025-07-01 DIAGNOSIS — G89.29 CHRONIC RIGHT SHOULDER PAIN: ICD-10-CM

## 2025-07-01 PROCEDURE — 97110 THERAPEUTIC EXERCISES: CPT | Performed by: PHYSICAL THERAPIST

## 2025-07-01 PROCEDURE — 97140 MANUAL THERAPY 1/> REGIONS: CPT | Performed by: PHYSICAL THERAPIST

## 2025-07-01 PROCEDURE — 97112 NEUROMUSCULAR REEDUCATION: CPT | Performed by: PHYSICAL THERAPIST

## 2025-07-01 NOTE — PROGRESS NOTES
"Daily Note     Today's date: 2025  Patient name: Vu Mcdaniels  : 1970  MRN: [id number]  Referring provider: Clive Solis,*  Dx:   Encounter Diagnosis     ICD-10-CM    1. S/P right rotator cuff repair  Z98.890       2. Chronic right shoulder pain  M25.511     G89.29                      Subjective: Pt States right shoulder ROM is improving       Objective: See treatment diary below      Assessment: Tolerated treatment well. Patient exhibited good technique with therapeutic exercises and would benefit from continued PT      Plan: Continue per plan of care.      Precautions: S/P right RTC repair       Manuals    PROM to right shoulder  To don  TM To don  MJC RR to don  RR to don  120 flex    90 abd    Gentle IR/ER     flex    90 abd    Gentle IR/ER To don, RR To don, TM To don RR  To don  TM                                          Neuro Re-Ed             Scap squeeze     5\" x20 5\" x20 5'20x      Pendulum     30x ea 30x ea 30x each  X30 ea  X30 ea   Elbow AROM      2x10 ea 2x10 ea --      Digiflex      Green 5\" x20 Green 5\" x20 --      TB wrist bar      Green  x20 Green  x20 --      IE/ER w/ TB  RTB  2x10 ea RTB  2x10 ea GTB 2x10 each       Red 2x10 ea   MTP/LTP w/ TB Green 5''20x ea GTB  5\" X20    GTB  5\" X20  GTB  5\" X20   Red 5''20x Green 5''20x Green 5''20x Green 5''20x  Green 5''20x ea   UBE for postural correction  120 rpm 5'/5' 120rpm  5'/5' 120rpm  5'/5' 120rpm  5'/5'  5/5 120  5/5 120  120 rpm 5'/5' 120 rpm 5'/5' 120 rpm 5'/5'   Compass X30 ea  x30 x30 X30 1#      30x each  X30 ea    Sidelying ER  3x10 3x10 3x10  3x10 1#      3x10  3x10   Prone flex/ext/hrz abd 2x10 ea 2x10 ea 2x10 ea 2x10 ea 1#      2x10 ea                Ther Ex             UT Levator stretch    15\" x4 ea 15''x4 each  15\" x4 ea 15\" x4 ea 15\" x4 ea      Pulley  5\" x20 ea flex/scap 5\" x20 ea flex/scap 5\" x20 ea flex/scap 5'' 20x flexion  5\" 20x ea flex/scap 5\" 20x ea " "flex/scap 5\" 20x ea flex/scap 5\" x20 ea flex/scap 5\" x20 ea flex/scap 5\" x20 ea flex/scap   Finger ladder  5\" x20 ea flex/scap 5\" x20 ea flex/scap 5\" x20 ea flex/scap 5''20x flex scap 5\" x20 ea flex/scap 5\" x20 ea flex/scap 5\" x20 ea flex/scap 5\" x20 ea flex/scap 5\" x20 ea flex/scap 5\" x20 ea flex/scap                                                                    Ther Activity                                       Gait Training                                       Modalities             CP x 10 min      X10 min no adverse effects  X10 mins X10 min                                                         "

## 2025-07-03 ENCOUNTER — OFFICE VISIT (OUTPATIENT)
Dept: PHYSICAL THERAPY | Facility: CLINIC | Age: 55
End: 2025-07-03
Attending: PHYSICIAN ASSISTANT
Payer: COMMERCIAL

## 2025-07-03 DIAGNOSIS — S46.811A TRAUMATIC RUPTURE OF SUPRASPINATUS TENDON OF RIGHT SHOULDER, INITIAL ENCOUNTER: ICD-10-CM

## 2025-07-03 DIAGNOSIS — M25.511 CHRONIC RIGHT SHOULDER PAIN: ICD-10-CM

## 2025-07-03 DIAGNOSIS — Z98.890 S/P RIGHT ROTATOR CUFF REPAIR: Primary | ICD-10-CM

## 2025-07-03 DIAGNOSIS — G89.29 CHRONIC RIGHT SHOULDER PAIN: ICD-10-CM

## 2025-07-03 PROCEDURE — 97140 MANUAL THERAPY 1/> REGIONS: CPT

## 2025-07-03 PROCEDURE — 97110 THERAPEUTIC EXERCISES: CPT

## 2025-07-03 PROCEDURE — 97112 NEUROMUSCULAR REEDUCATION: CPT

## 2025-07-03 NOTE — PROGRESS NOTES
"Daily Note     Today's date: 7/3/2025  Patient name: Vu Mcdaniels  : 1970  MRN: [id number]  Referring provider: Clive Solis,*  Dx:   Encounter Diagnosis     ICD-10-CM    1. S/P right rotator cuff repair  Z98.890       2. Chronic right shoulder pain  M25.511     G89.29       3. Traumatic rupture of supraspinatus tendon of right shoulder, initial encounter  S46.811A                      Subjective: Pt denied pain prior to PT. He feels that his ROM is WNL, but continues to feel weak.       Objective: See treatment diary below      Assessment: Initiated multiple AROM PREs to facilitate strength of the RUE. Tolerated treatment well. Patient demonstrated fatigue post treatment, exhibited good technique with therapeutic exercises, and would benefit from continued PT      Plan: Continue per plan of care.  Progress treament per protocol.      Precautions: S/P right RTC repair       Manuals 6/19 6/23 6/26 7/1 7/3  6/5 6/9 6/12 6/16   PROM to right shoulder  To don  TM To don  MJC RR to don  RR to don  To don  TM  To don, RR To don, TM To don RR  To don  TM                                          Neuro Re-Ed             Scap squeeze       5'20x      Pendulum       30x each  X30 ea  X30 ea   Elbow AROM        --      Digiflex        --      TB wrist bar        --      IE/ER w/ TB  RTB  2x10 ea RTB  2x10 ea GTB 2x10 each  IR w/ GTB x20    ER w/ RTB x20     Red 2x10 ea   MTP/LTP w/ TB Green 5''20x ea GTB  5\" X20    GTB  5\" X20  GTB  5\" X20  Blue TB 5\" x30  Green 5''20x Green 5''20x Green 5''20x  Green 5''20x ea   UBE for postural correction  120 rpm 5'/5' 120rpm  5'/5' 120rpm  5'/5' 120rpm  5'/5' 120rpm  5'/5'  5/5 120  120 rpm 5'/5' 120 rpm 5'/5' 120 rpm 5'/5'   Compass X30 ea  x30 x30 X30 1#  X30 ea 1#     30x each  X30 ea    Sidelying ER  3x10 3x10 3x10  3x10 1#  3x10 1#    3x10  3x10   Prone flex/ext/hrz abd 2x10 ea 2x10 ea 2x10 ea 2x10 ea 1# 2x10 ea 1#     2x10 ea                Ther Ex           " "  UT Levator stretch    15\" x4 ea 15''x4 each  DC  15\" x4 ea      Pulley  5\" x20 ea flex/scap 5\" x20 ea flex/scap 5\" x20 ea flex/scap 5'' 20x flexion  DC  5\" 20x ea flex/scap 5\" x20 ea flex/scap 5\" x20 ea flex/scap 5\" x20 ea flex/scap   Finger ladder  5\" x20 ea flex/scap 5\" x20 ea flex/scap 5\" x20 ea flex/scap 5''20x flex scap 5''20x flex scap  5\" x20 ea flex/scap 5\" x20 ea flex/scap 5\" x20 ea flex/scap 5\" x20 ea flex/scap   AROM flex to 90*     1# 2\" 2X10        AROM scap to 90*     1# 2\" 2X10                                               Ther Activity                                       Gait Training                                       Modalities             CP x 10 min      X10 min no adverse effects                                                             "

## 2025-07-07 ENCOUNTER — OFFICE VISIT (OUTPATIENT)
Dept: FAMILY MEDICINE CLINIC | Facility: CLINIC | Age: 55
End: 2025-07-07
Payer: COMMERCIAL

## 2025-07-07 ENCOUNTER — OFFICE VISIT (OUTPATIENT)
Dept: PHYSICAL THERAPY | Facility: CLINIC | Age: 55
End: 2025-07-07
Attending: PHYSICIAN ASSISTANT
Payer: COMMERCIAL

## 2025-07-07 VITALS
OXYGEN SATURATION: 98 % | TEMPERATURE: 97.9 F | BODY MASS INDEX: 39.02 KG/M2 | WEIGHT: 248.6 LBS | SYSTOLIC BLOOD PRESSURE: 130 MMHG | DIASTOLIC BLOOD PRESSURE: 78 MMHG | HEIGHT: 67 IN | HEART RATE: 89 BPM

## 2025-07-07 DIAGNOSIS — E11.69 TYPE 2 DIABETES MELLITUS WITH OTHER SPECIFIED COMPLICATION, UNSPECIFIED WHETHER LONG TERM INSULIN USE (HCC): ICD-10-CM

## 2025-07-07 DIAGNOSIS — M25.511 CHRONIC RIGHT SHOULDER PAIN: ICD-10-CM

## 2025-07-07 DIAGNOSIS — Z11.59 NEED FOR HEPATITIS C SCREENING TEST: ICD-10-CM

## 2025-07-07 DIAGNOSIS — Z11.4 SCREENING FOR HIV (HUMAN IMMUNODEFICIENCY VIRUS): ICD-10-CM

## 2025-07-07 DIAGNOSIS — F51.01 PRIMARY INSOMNIA: ICD-10-CM

## 2025-07-07 DIAGNOSIS — E11.69 TYPE 2 DIABETES MELLITUS WITH OTHER SPECIFIED COMPLICATION, WITHOUT LONG-TERM CURRENT USE OF INSULIN (HCC): ICD-10-CM

## 2025-07-07 DIAGNOSIS — G89.29 CHRONIC RIGHT SHOULDER PAIN: ICD-10-CM

## 2025-07-07 DIAGNOSIS — Z98.890 S/P RIGHT ROTATOR CUFF REPAIR: Primary | ICD-10-CM

## 2025-07-07 DIAGNOSIS — Z00.00 ANNUAL PHYSICAL EXAM: Primary | ICD-10-CM

## 2025-07-07 DIAGNOSIS — I15.9 SECONDARY HYPERTENSION: ICD-10-CM

## 2025-07-07 LAB — SL AMB POCT HEMOGLOBIN AIC: 6.2 (ref ?–6.5)

## 2025-07-07 PROCEDURE — 97140 MANUAL THERAPY 1/> REGIONS: CPT | Performed by: PHYSICAL THERAPIST

## 2025-07-07 PROCEDURE — 99396 PREV VISIT EST AGE 40-64: CPT | Performed by: STUDENT IN AN ORGANIZED HEALTH CARE EDUCATION/TRAINING PROGRAM

## 2025-07-07 PROCEDURE — 97112 NEUROMUSCULAR REEDUCATION: CPT | Performed by: PHYSICAL THERAPIST

## 2025-07-07 PROCEDURE — 83036 HEMOGLOBIN GLYCOSYLATED A1C: CPT | Performed by: STUDENT IN AN ORGANIZED HEALTH CARE EDUCATION/TRAINING PROGRAM

## 2025-07-07 PROCEDURE — 99214 OFFICE O/P EST MOD 30 MIN: CPT | Performed by: STUDENT IN AN ORGANIZED HEALTH CARE EDUCATION/TRAINING PROGRAM

## 2025-07-07 PROCEDURE — 97110 THERAPEUTIC EXERCISES: CPT | Performed by: PHYSICAL THERAPIST

## 2025-07-07 RX ORDER — AMLODIPINE BESYLATE 5 MG/1
5 TABLET ORAL DAILY
Qty: 60 TABLET | Refills: 0 | Status: SHIPPED | OUTPATIENT
Start: 2025-07-07

## 2025-07-07 RX ORDER — METFORMIN HYDROCHLORIDE 500 MG/1
500 TABLET, EXTENDED RELEASE ORAL
Qty: 60 TABLET | Refills: 2 | Status: SHIPPED | OUTPATIENT
Start: 2025-07-07 | End: 2025-10-05

## 2025-07-07 RX ORDER — METFORMIN HYDROCHLORIDE 500 MG/1
1000 TABLET, EXTENDED RELEASE ORAL
Qty: 60 TABLET | Refills: 2 | Status: SHIPPED | OUTPATIENT
Start: 2025-07-07 | End: 2025-07-07 | Stop reason: SDUPTHER

## 2025-07-07 RX ORDER — ATORVASTATIN CALCIUM 40 MG/1
40 TABLET, FILM COATED ORAL DAILY
Qty: 100 TABLET | Refills: 1 | Status: SHIPPED | OUTPATIENT
Start: 2025-07-07

## 2025-07-07 RX ORDER — LANCETS 33 GAUGE
1 EACH MISCELLANEOUS 2 TIMES DAILY
Qty: 100 EACH | Refills: 0 | Status: SHIPPED | OUTPATIENT
Start: 2025-07-07

## 2025-07-07 RX ORDER — BLOOD SUGAR DIAGNOSTIC
STRIP MISCELLANEOUS
Qty: 200 EACH | Refills: 3 | Status: SHIPPED | OUTPATIENT
Start: 2025-07-07

## 2025-07-07 NOTE — PROGRESS NOTES
"Daily Note     Today's date: 2025  Patient name: Vu Mcdaniels  : 1970  MRN: [id number]  Referring provider: Clive Solis,*  Dx:   Encounter Diagnosis     ICD-10-CM    1. S/P right rotator cuff repair  Z98.890       2. Chronic right shoulder pain  M25.511     G89.29                      Subjective: Pt states right shoulder is doing well. He will be leaving for vacation this weekend       Objective: See treatment diary below      Assessment: Tolerated treatment well. Patient exhibited good technique with therapeutic exercises and would benefit from continued PT      Plan: Continue per plan of care.      Precautions: S/P right RTC repair       Manuals 6/19 6/23 6/26 7/1 7/3 7/7 6/5 6/9 6/12 6/16   PROM to right shoulder  To don  TM To don  MJC RR to don  RR to don  To don  TM To don RR To don, RR To don, TM To don RR  To don  TM                                          Neuro Re-Ed             Scap squeeze       5'20x      Pendulum       30x each  X30 ea  X30 ea   Elbow AROM        --      Digiflex        --      TB wrist bar        --      IE/ER w/ TB  RTB  2x10 ea RTB  2x10 ea GTB 2x10 each  IR w/ GTB x20    ER w/ RTB x20 IR w/ BTB x20    ER w/ RTB x20    Red 2x10 ea   MTP/LTP w/ TB Green 5''20x ea GTB  5\" X20    GTB  5\" X20  GTB  5\" X20  Blue TB 5\" x30 Blue TB 5\" x30 Green 5''20x Green 5''20x Green 5''20x  Green 5''20x ea   UBE for postural correction  120 rpm 5'/5' 120rpm  5'/5' 120rpm  5'/5' 120rpm  5'/5' 120rpm  5'/5' 120rpm  5'/5' 5/5 120  120 rpm 5'/5' 120 rpm 5'/5' 120 rpm 5'/5'   Compass X30 ea  x30 x30 X30 1#  X30 ea 1#  X30 ea 1#    30x each  X30 ea    Sidelying ER  3x10 3x10 3x10  3x10 1#  3x10 1# 3x10 1#   3x10  3x10   Prone flex/ext/hrz abd 2x10 ea 2x10 ea 2x10 ea 2x10 ea 1# 2x10 ea 1# 2x10 ea 1#    2x10 ea                Ther Ex             UT Levator stretch    15\" x4 ea 15''x4 each  DC  15\" x4 ea      Pulley  5\" x20 ea flex/scap 5\" x20 ea flex/scap 5\" x20 ea flex/scap 5'' 20x " "flexion  DC  5\" 20x ea flex/scap 5\" x20 ea flex/scap 5\" x20 ea flex/scap 5\" x20 ea flex/scap   Finger ladder  5\" x20 ea flex/scap 5\" x20 ea flex/scap 5\" x20 ea flex/scap 5''20x flex scap 5''20x flex scap 5''20x flex scap 5\" x20 ea flex/scap 5\" x20 ea flex/scap 5\" x20 ea flex/scap 5\" x20 ea flex/scap   AROM flex to 90*     1# 2\" 2X10 1# 2\" 2X10       AROM scap to 90*     1# 2\" 2X10 1# 2\" 2X10                                              Ther Activity                                       Gait Training                                       Modalities             CP x 10 min      X10 min no adverse effects                                                               "

## 2025-07-07 NOTE — ASSESSMENT & PLAN NOTE
Lab Results   Component Value Date    HGBA1C 6.2 07/07/2025     Recent glucose and Hba1c labs reviewed  Patient reports no side effects from current medications  Discussed lifestyle modifications that could be beneficial including increasing exercise, weight lose, and dietary changes  Discussed the importance of foot care, interval eye exams as well as short and long term sequela of Diabetes           Orders:    Albumin / creatinine urine ratio; Future    POCT hemoglobin A1c    Lancets 33G MISC; Inject 1 Lancet under the skin in the morning and 1 Lancet before bedtime. Check blood sugars twice daily. Please substitute with appropriate alternative as covered by patient's insurance. Dx: E11.65.    glucose blood (OneTouch Verio) test strip; Check blood sugars twice daily. Please substitute with appropriate alternative as covered by patient's insurance. Dx: E11.65

## 2025-07-07 NOTE — ASSESSMENT & PLAN NOTE
Lab Results   Component Value Date    HGBA1C 6.2 07/07/2025       Orders:    atorvastatin (LIPITOR) 40 mg tablet; Take 1 tablet (40 mg total) by mouth daily    metFORMIN (GLUCOPHAGE-XR) 500 mg 24 hr tablet; Take 1 tablet (500 mg total) by mouth daily with dinner

## 2025-07-07 NOTE — PROGRESS NOTES
Adult Annual Physical  Name: Vu Mcdaniels      : 1970      MRN: [id number]  Encounter Provider: Markos Yousif MD  Encounter Date: 2025   Encounter department: [facility]    :  Assessment & Plan  Annual physical exam         Type 2 diabetes mellitus with other specified complication, without long-term current use of insulin (HCC)    Lab Results   Component Value Date    HGBA1C 6.2 2025     Recent glucose and Hba1c labs reviewed  Patient reports no side effects from current medications  Discussed lifestyle modifications that could be beneficial including increasing exercise, weight lose, and dietary changes  Discussed the importance of foot care, interval eye exams as well as short and long term sequela of Diabetes           Orders:    Albumin / creatinine urine ratio; Future    POCT hemoglobin A1c    Lancets 33G MISC; Inject 1 Lancet under the skin in the morning and 1 Lancet before bedtime. Check blood sugars twice daily. Please substitute with appropriate alternative as covered by patient's insurance. Dx: E11.65.    glucose blood (OneTouch Verio) test strip; Check blood sugars twice daily. Please substitute with appropriate alternative as covered by patient's insurance. Dx: E11.65    Need for hepatitis C screening test    Orders:    Hepatitis C Antibody; Future    Screening for HIV (human immunodeficiency virus)    Orders:    HIV 1/2 AG/AB w Reflex [facility] for 2 yr old and above; Future    Type 2 diabetes mellitus with other specified complication, unspecified whether long term insulin use (HCC)    Lab Results   Component Value Date    HGBA1C 6.2 2025       Orders:    atorvastatin (LIPITOR) 40 mg tablet; Take 1 tablet (40 mg total) by mouth daily    metFORMIN (GLUCOPHAGE-XR) 500 mg 24 hr tablet; Take 1 tablet (500 mg total) by mouth daily with dinner    Secondary hypertension    Orders:    amLODIPine (NORVASC) 5 mg tablet; Take 1 tablet (5 mg total) by mouth  daily    Primary insomnia  Reports trouble falling and staying asleep   Uses melatonin 12mg/benadryl   Unsure if snoring, has not been using cpap due to recent surgery                  Immunizations:  - Immunizations due: Prevnar 20, Tdap and Zoster (Shingrix)         History of Present Illness     Adult Annual Physical:  Patient presents for annual physical.     Diet and Physical Activity:  - Diet/Nutrition: no special diet.  - Exercise: no formal exercise.    Depression Screening:  - PHQ-2 Score: 0    General Health:  - Sleep: sleeps poorly.  - Hearing: normal hearing right ear.  - Vision: wears glasses and most recent eye exam < 1 year ago.     Health:  - History of STDs: no.   - Urinary symptoms: none.     Advanced Care Planning:  - Has an advanced directive?: no    - Has a durable medical POA?: no    - ACP document given to patient?: no      Review of Systems   Constitutional:  Negative for activity change, appetite change, chills, fatigue and fever.   HENT:  Negative for congestion, dental problem, drooling, ear discharge, ear pain, facial swelling, postnasal drip, rhinorrhea and sinus pain.    Eyes:  Negative for photophobia, pain, discharge and itching.   Respiratory:  Negative for apnea, cough, chest tightness and shortness of breath.    Cardiovascular:  Negative for chest pain and leg swelling.   Gastrointestinal:  Negative for abdominal distention, abdominal pain, anal bleeding, constipation, diarrhea and nausea.   Endocrine: Negative for cold intolerance, heat intolerance and polydipsia.   Genitourinary:  Negative for difficulty urinating.   Musculoskeletal:  Negative for arthralgias, gait problem, joint swelling and myalgias.   Skin:  Negative for color change and pallor.   Allergic/Immunologic: Negative for immunocompromised state.   Neurological:  Negative for dizziness, seizures, facial asymmetry, weakness, light-headedness, numbness and headaches.   Psychiatric/Behavioral:  Negative for agitation,  "behavioral problems, confusion, decreased concentration and dysphoric mood.    All other systems reviewed and are negative.        Objective   /78 (BP Location: Left arm, Patient Position: Sitting, Cuff Size: Adult)   Pulse 89   Temp 97.9 °F (36.6 °C) (Tympanic)   Ht 5' 7\" (1.702 m)   Wt 113 kg (248 lb 9.6 oz)   SpO2 98%   BMI 38.94 kg/m²     Physical Exam  Constitutional:       Appearance: He is well-developed.   HENT:      Head: Normocephalic.     Eyes:      Pupils: Pupils are equal, round, and reactive to light.       Cardiovascular:      Rate and Rhythm: Normal rate and regular rhythm.   Pulmonary:      Effort: Pulmonary effort is normal.      Breath sounds: Normal breath sounds.   Abdominal:      General: Bowel sounds are normal.      Palpations: Abdomen is soft.     Musculoskeletal:         General: Normal range of motion.      Cervical back: Normal range of motion and neck supple.     Skin:     General: Skin is warm.         "

## 2025-07-09 ENCOUNTER — APPOINTMENT (OUTPATIENT)
Dept: PHYSICAL THERAPY | Facility: CLINIC | Age: 55
End: 2025-07-09
Attending: PHYSICIAN ASSISTANT
Payer: COMMERCIAL

## 2025-08-05 VITALS — WEIGHT: 248 LBS | BODY MASS INDEX: 38.92 KG/M2 | HEIGHT: 67 IN

## 2025-08-05 DIAGNOSIS — R22.31 MASS OF RIGHT HAND: ICD-10-CM

## 2025-08-05 DIAGNOSIS — Z98.890 S/P ARTHROSCOPY OF RIGHT SHOULDER: Primary | ICD-10-CM

## 2025-08-05 PROCEDURE — 99213 OFFICE O/P EST LOW 20 MIN: CPT | Performed by: ORTHOPAEDIC SURGERY

## 2025-08-05 RX ORDER — SODIUM CHLORIDE, SODIUM LACTATE, POTASSIUM CHLORIDE, CALCIUM CHLORIDE 600; 310; 30; 20 MG/100ML; MG/100ML; MG/100ML; MG/100ML
20 INJECTION, SOLUTION INTRAVENOUS CONTINUOUS
OUTPATIENT
Start: 2025-08-05

## 2025-08-05 RX ORDER — CHLORHEXIDINE GLUCONATE ORAL RINSE 1.2 MG/ML
15 SOLUTION DENTAL ONCE
OUTPATIENT
Start: 2025-08-05 | End: 2025-08-05

## 2025-08-05 RX ORDER — CHLORHEXIDINE GLUCONATE 40 MG/ML
SOLUTION TOPICAL DAILY PRN
OUTPATIENT
Start: 2025-08-05

## (undated) DEVICE — NEEDLE 18 G X 1 1/2 SAFETY

## (undated) DEVICE — AMBIENT COVAC 50 WITH INTEGRATED FINGER SWITCHES IFS: Brand: COBLATION

## (undated) DEVICE — PACK PBDS SHOULDER ARTHROSCOPY RF

## (undated) DEVICE — NEEDLE SPINAL 18G X 3IN DISP

## (undated) DEVICE — SYRINGE 50ML LL

## (undated) DEVICE — 4-PORT MANIFOLD: Brand: NEPTUNE 2

## (undated) DEVICE — GLOVE SRG BIOGEL 7.5

## (undated) DEVICE — DISPOSABLE OR TOWEL: Brand: CARDINAL HEALTH

## (undated) DEVICE — LIGHT GLOVE GREEN

## (undated) DEVICE — GLOVE SRG BIOGEL 8

## (undated) DEVICE — INTENDED FOR TISSUE SEPARATION, AND OTHER PROCEDURES THAT REQUIRE A SHARP SURGICAL BLADE TO PUNCTURE OR CUT.: Brand: BARD-PARKER ® CARBON RIB-BACK BLADES

## (undated) DEVICE — DISPOSABLE CANNULA WITH OBTURATOR, SMOOTH, 6.0 MM X 75 MM: Brand: CONMED

## (undated) DEVICE — ASTOUND FABRIC REINFORCED SURGICAL GOWN: Brand: CONVERTORS

## (undated) DEVICE — 3M™ DURAPORE™ SURGICAL TAPE 1538-3, 3 INCH X 10 YARD (7,5CM X 9,1M), 4 ROLLS/BOX: Brand: 3M™ DURAPORE™

## (undated) DEVICE — READY WET SKIN SCRUB TRAY-LF: Brand: MEDLINE INDUSTRIES, INC.

## (undated) DEVICE — NEEDLE 21 G X 1 1/2 SAFETY

## (undated) DEVICE — PREP SURGICAL PURPREP 26ML

## (undated) DEVICE — SPECIMEN TISSUE TRAP 12MM RIGID

## (undated) DEVICE — SUT ETHILON 3-0 INFS-1 30 IN 669H

## (undated) DEVICE — 3M™ STERI-DRAPE™ U-DRAPE 1015: Brand: STERI-DRAPE™

## (undated) DEVICE — BLADE SHAVER EXCALIBUR 4MM 13CM COOLCUT

## (undated) DEVICE — ABDOMINAL PAD: Brand: DERMACEA

## (undated) DEVICE — SYRINGE 10ML LL

## (undated) DEVICE — CANNULA DISP 8.25 X 70MM W/SEAL SIDE PORT THRD

## (undated) DEVICE — GLOVE INDICATOR PI UNDERGLOVE SZ 7.5 BLUE

## (undated) DEVICE — GAUZE SPONGES,16 PLY: Brand: CURITY

## (undated) DEVICE — NEEDLE SUT SCORPION MULTIFIRE

## (undated) DEVICE — TUBING SUCTION 5MM X 12 FT

## (undated) DEVICE — Device

## (undated) DEVICE — GLOVE INDICATOR PI UNDERGLOVE SZ 8 BLUE

## (undated) DEVICE — AMIENT MEGAVAC 90 WAND: Brand: COBLATION

## (undated) DEVICE — BURR  OVAL 4MM 13CM 8 FLUTE COOLCUT

## (undated) DEVICE — SUT FIBERTAPE 2MM X 7IN AR-7237-7

## (undated) DEVICE — TUBING ARTHROSCOPIC WAVE  MAIN PUMP

## (undated) DEVICE — T-MAX DISPOSABLE FACE MASK 8 PER BOX

## (undated) DEVICE — STOCKINETTE,IMPERVIOUS,12X48,STERILE: Brand: MEDLINE